# Patient Record
Sex: MALE | Race: WHITE | NOT HISPANIC OR LATINO | Employment: OTHER | URBAN - METROPOLITAN AREA
[De-identification: names, ages, dates, MRNs, and addresses within clinical notes are randomized per-mention and may not be internally consistent; named-entity substitution may affect disease eponyms.]

---

## 2017-01-12 ENCOUNTER — HOSPITAL ENCOUNTER (OUTPATIENT)
Dept: CT IMAGING | Facility: HOSPITAL | Age: 77
Discharge: HOME | End: 2017-01-12
Attending: INTERNAL MEDICINE | Admitting: INTERNAL MEDICINE

## 2017-01-12 DIAGNOSIS — K57.92 ACUTE DIVERTICULITIS: ICD-10-CM

## 2017-01-12 PROCEDURE — 74177 CT ABD & PELVIS W/CONTRAST: CPT

## 2017-01-12 PROCEDURE — 82565 ASSAY OF CREATININE: CPT

## 2017-01-12 PROCEDURE — 0 IOPAMIDOL 61 % SOLUTION: Performed by: INTERNAL MEDICINE

## 2017-01-12 RX ADMIN — BARIUM SULFATE 450 ML: 21 SUSPENSION ORAL at 13:45

## 2017-01-12 RX ADMIN — IOPAMIDOL 99 ML: 612 INJECTION, SOLUTION INTRAVENOUS at 14:52

## 2017-01-13 LAB — CREAT BLDA-MCNC: 0.8 MG/DL (ref 0.6–1.3)

## 2017-03-15 ENCOUNTER — LAB (OUTPATIENT)
Dept: LAB | Facility: HOSPITAL | Age: 77
End: 2017-03-15

## 2017-03-15 ENCOUNTER — OFFICE VISIT (OUTPATIENT)
Dept: ONCOLOGY | Facility: CLINIC | Age: 77
End: 2017-03-15

## 2017-03-15 VITALS
TEMPERATURE: 97.9 F | SYSTOLIC BLOOD PRESSURE: 141 MMHG | WEIGHT: 182 LBS | HEIGHT: 73 IN | HEART RATE: 62 BPM | DIASTOLIC BLOOD PRESSURE: 66 MMHG | RESPIRATION RATE: 16 BRPM | BODY MASS INDEX: 24.12 KG/M2

## 2017-03-15 DIAGNOSIS — C90.30 PLASMACYTOMA (HCC): ICD-10-CM

## 2017-03-15 DIAGNOSIS — C90.30 PLASMACYTOMA (HCC): Primary | ICD-10-CM

## 2017-03-15 LAB
ALBUMIN SERPL-MCNC: 4.3 G/DL (ref 3.2–4.8)
ALBUMIN/GLOB SERPL: 1.3 G/DL (ref 1.5–2.5)
ALP SERPL-CCNC: 64 U/L (ref 25–100)
ALT SERPL W P-5'-P-CCNC: 19 U/L (ref 7–40)
ANION GAP SERPL CALCULATED.3IONS-SCNC: -1 MMOL/L (ref 3–11)
AST SERPL-CCNC: 21 U/L (ref 0–33)
BILIRUB SERPL-MCNC: 0.6 MG/DL (ref 0.3–1.2)
BUN BLD-MCNC: 17 MG/DL (ref 9–23)
BUN/CREAT SERPL: 18.9 (ref 7–25)
CALCIUM SPEC-SCNC: 10.1 MG/DL (ref 8.7–10.4)
CHLORIDE SERPL-SCNC: 104 MMOL/L (ref 99–109)
CO2 SERPL-SCNC: 37 MMOL/L (ref 20–31)
CREAT BLD-MCNC: 0.9 MG/DL (ref 0.6–1.3)
CRP SERPL-MCNC: 0.5 MG/DL (ref 0–10)
ERYTHROCYTE [DISTWIDTH] IN BLOOD BY AUTOMATED COUNT: 14.6 % (ref 11.3–14.5)
ERYTHROCYTE [SEDIMENTATION RATE] IN BLOOD: 21 MM/HR (ref 0–20)
GFR SERPL CREATININE-BSD FRML MDRD: 82 ML/MIN/1.73
GLOBULIN UR ELPH-MCNC: 3.4 GM/DL
GLUCOSE BLD-MCNC: 151 MG/DL (ref 70–100)
HCT VFR BLD AUTO: 40.5 % (ref 38.9–50.9)
HGB BLD-MCNC: 13.1 G/DL (ref 13.1–17.5)
LYMPHOCYTES # BLD AUTO: 2 10*3/MM3 (ref 0.6–4.8)
LYMPHOCYTES NFR BLD AUTO: 30.5 % (ref 24–44)
MCH RBC QN AUTO: 31.1 PG (ref 27–31)
MCHC RBC AUTO-ENTMCNC: 32.4 G/DL (ref 32–36)
MCV RBC AUTO: 96.1 FL (ref 80–99)
MONOCYTES # BLD AUTO: 0.2 10*3/MM3 (ref 0–1)
MONOCYTES NFR BLD AUTO: 3.3 % (ref 0–12)
NEUTROPHILS # BLD AUTO: 4.4 10*3/MM3 (ref 1.5–8.3)
NEUTROPHILS NFR BLD AUTO: 66.2 % (ref 41–71)
PLATELET # BLD AUTO: 140 10*3/MM3 (ref 150–450)
PMV BLD AUTO: 8.4 FL (ref 6–12)
POTASSIUM BLD-SCNC: 4.3 MMOL/L (ref 3.5–5.5)
PROT SERPL-MCNC: 7.7 G/DL (ref 5.7–8.2)
RBC # BLD AUTO: 4.21 10*6/MM3 (ref 4.2–5.76)
SODIUM BLD-SCNC: 140 MMOL/L (ref 132–146)
WBC NRBC COR # BLD: 6.6 10*3/MM3 (ref 3.5–10.8)

## 2017-03-15 PROCEDURE — 84155 ASSAY OF PROTEIN SERUM: CPT | Performed by: INTERNAL MEDICINE

## 2017-03-15 PROCEDURE — 82232 ASSAY OF BETA-2 PROTEIN: CPT | Performed by: INTERNAL MEDICINE

## 2017-03-15 PROCEDURE — 86334 IMMUNOFIX E-PHORESIS SERUM: CPT | Performed by: INTERNAL MEDICINE

## 2017-03-15 PROCEDURE — 36415 COLL VENOUS BLD VENIPUNCTURE: CPT

## 2017-03-15 PROCEDURE — 84165 PROTEIN E-PHORESIS SERUM: CPT | Performed by: INTERNAL MEDICINE

## 2017-03-15 PROCEDURE — 86140 C-REACTIVE PROTEIN: CPT | Performed by: INTERNAL MEDICINE

## 2017-03-15 PROCEDURE — 85652 RBC SED RATE AUTOMATED: CPT | Performed by: INTERNAL MEDICINE

## 2017-03-15 PROCEDURE — 80053 COMPREHEN METABOLIC PANEL: CPT | Performed by: INTERNAL MEDICINE

## 2017-03-15 PROCEDURE — 99213 OFFICE O/P EST LOW 20 MIN: CPT | Performed by: INTERNAL MEDICINE

## 2017-03-15 PROCEDURE — 85025 COMPLETE CBC W/AUTO DIFF WBC: CPT

## 2017-03-15 PROCEDURE — 83883 ASSAY NEPHELOMETRY NOT SPEC: CPT | Performed by: INTERNAL MEDICINE

## 2017-03-15 RX ORDER — DICYCLOMINE HYDROCHLORIDE 10 MG/1
CAPSULE ORAL
COMMUNITY
Start: 2017-01-12 | End: 2017-05-17

## 2017-03-15 NOTE — PROGRESS NOTES
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  He also has history of a benign range tumor that was resected in 1991.  He's been feeling great.  His energy level is been fine.  His activity level is been high.  He is managed to avoid the flu and is had no recent other infectious illnesses.  He has had no new bony or neurologic symptoms      PROBLEM LIST   Patient Active Problem List   Diagnosis   • CAD (coronary artery disease)   • Dyslipidemia   • Right hip pain   • Arthritis   • Cellulitis of left lower extremity   • Left carotid bruit   • Plasmacytoma   • Brain cancer   • Neck pain       HISTORY OF PRESENT ILLNESS:   This very pleasant 76-year-old gentleman returns.    Past Medical History, Past Surgical History, Social History, Family History have been reviewed and are without significant changes except as mentioned.      Medications:      Current Outpatient Prescriptions:   •  aspirin 81 MG tablet, Take  by mouth daily., Disp: , Rfl:   •  atorvastatin (LIPITOR) 20 MG tablet, Take  by mouth. 1 QHS, Disp: , Rfl:   •  dicyclomine (BENTYL) 10 MG capsule, , Disp: , Rfl:   •  ezetimibe (ZETIA) 10 MG tablet, Take  by mouth., Disp: , Rfl:   •  glimepiride (AMARYL) 2 MG tablet, Take  by mouth daily., Disp: , Rfl:   •  latanoprost (XALATAN) 0.005 % ophthalmic solution, Apply  to eye. qhs, Disp: , Rfl:   •  metoprolol succinate XL (TOPROL-XL) 25 MG 24 hr tablet, Take 1 tablet by mouth daily. 1 QD, Disp: 90 tablet, Rfl: 3  •  nitroglycerin (NITROSTAT) 0.4 MG SL tablet, Place 1 tablet under the tongue every 5 (five) minutes as needed for chest pain., Disp: 25 tablet, Rfl: 6  •  ramipril (ALTACE) 5 MG capsule, Take  by mouth. 1 QD, Disp: , Rfl:     ALLERGIES:    Allergies   Allergen Reactions   • Penicillins    • Zocor [Simvastatin]        ROS:  Review of Systems   Constitutional: Negative for  "activity change and appetite change.   HENT: Negative for mouth sores, sinus pressure and voice change.    Eyes: Negative for visual disturbance.   Respiratory: Negative for shortness of breath.    Cardiovascular: Negative for chest pain.   Gastrointestinal: Negative for abdominal pain and vomiting.   Genitourinary: Negative for dysuria.   Musculoskeletal: Negative for arthralgias and myalgias.   Skin: Negative for color change.   Neurological: Negative for dizziness, syncope and headaches.   Hematological: Negative for adenopathy.   Psychiatric/Behavioral: Negative for confusion, sleep disturbance and suicidal ideas. The patient is not nervous/anxious.            Objective:    Visit Vitals   • /66   • Pulse 62   • Temp 97.9 °F (36.6 °C) (Temporal Artery )   • Resp 16   • Ht 73\" (185.4 cm)   • Wt 182 lb (82.6 kg)   • BMI 24.01 kg/m2       Physical Exam   Constitutional: He is oriented to person, place, and time. He appears well-developed and well-nourished. No distress.   HENT:   Head: Normocephalic.   Mouth/Throat: Oropharynx is clear and moist.   Eyes: Conjunctivae and EOM are normal. No scleral icterus.   Neck: Normal range of motion. Neck supple. No thyromegaly present.   Cardiovascular: Normal rate, regular rhythm and normal heart sounds.    No murmur heard.  Pulmonary/Chest: Effort normal and breath sounds normal. He has no wheezes. He has no rales.   Abdominal: Soft. Bowel sounds are normal. He exhibits no distension and no mass. There is no tenderness.   Musculoskeletal: He exhibits no edema or tenderness.   Lymphadenopathy:     He has no cervical adenopathy.   Neurological: He is alert and oriented to person, place, and time. He displays normal reflexes. No cranial nerve deficit.   Skin: Skin is warm and dry. No rash noted.   Psychiatric: He has a normal mood and affect. Judgment normal.   Vitals reviewed.             RECENT LABS:  Hematology WBC   Date Value Ref Range Status   03/15/2017 6.60 3.50 - " 10.80 10*3/mm3 Final   03/17/2016 7.20 3.50 - 10.80 K/mcL Final     HEMOGLOBIN   Date Value Ref Range Status   03/15/2017 13.1 13.1 - 17.5 g/dL Final   03/17/2016 12.3 (L) 13.1 - 17.5 g/dL Final     HEMATOCRIT   Date Value Ref Range Status   03/15/2017 40.5 38.9 - 50.9 % Final   03/17/2016 37.6 (L) 38.9 - 50.9 % Final     MCV   Date Value Ref Range Status   03/15/2017 96.1 80.0 - 99.0 fL Final   03/17/2016 95.5 80.0 - 99.0 fL Final     RDW   Date Value Ref Range Status   03/15/2017 14.6 (H) 11.3 - 14.5 % Final   03/17/2016 14.2 11.3 - 14.5 % Final     MPV   Date Value Ref Range Status   03/15/2017 8.4 6.0 - 12.0 fL Final   03/17/2016 8.3 fL Final     PLATELETS   Date Value Ref Range Status   03/15/2017 140 (L) 150 - 450 10*3/mm3 Final     Comment:     Verified by repeat analysis.    03/17/2016 129 (L) 150 - 450 K/mcL Final     NEUTROPHILS, ABSOLUTE   Date Value Ref Range Status   03/15/2017 4.40 1.50 - 8.30 10*3/mm3 Final     NEUTROPHILS ABSOLUTE   Date Value Ref Range Status   03/17/2016 4.70 1.50 - 8.30 K/mcL Final     LYMPHOCYTES, ABSOLUTE   Date Value Ref Range Status   03/15/2017 2.00 0.60 - 4.80 10*3/mm3 Final     LYMPHOCYTES ABSOLUTE   Date Value Ref Range Status   03/17/2016 2.10 0.60 - 4.80 K/mcL Final     MONOCYTES, ABSOLUTE   Date Value Ref Range Status   03/15/2017 0.20 0.00 - 1.00 10*3/mm3 Final     MONOCYTES ABSOLUTE   Date Value Ref Range Status   03/17/2016 0.40 0.00 - 1.00 K/mcL Final     EOSINOPHILS ABSOLUTE   Date Value Ref Range Status   09/17/2015 0.19 0.10 - 0.30 K/mcL Final     BASOPHILS ABSOLUTE   Date Value Ref Range Status   09/17/2015 0.02 0.00 - 0.20 K/mcL Final     NRBC   Date Value Ref Range Status   03/19/2015 0.0  Final       GLUCOSE   Date Value Ref Range Status   03/15/2017 151 (H) 70 - 100 mg/dL Final   03/17/2016 168 (H) 70 - 100 mg/dL Final     SODIUM   Date Value Ref Range Status   03/15/2017 140 132 - 146 mmol/L Final   03/17/2016 140 132 - 146 mmol/L Final     POTASSIUM   Date  Value Ref Range Status   03/15/2017 4.3 3.5 - 5.5 mmol/L Final   03/17/2016 3.9 3.5 - 5.5 mmol/L Final     CO2   Date Value Ref Range Status   03/15/2017 37.0 (H) 20.0 - 31.0 mmol/L Final   03/17/2016 32 (H) 20 - 31 mmol/L Final     CHLORIDE   Date Value Ref Range Status   03/15/2017 104 99 - 109 mmol/L Final   03/17/2016 105 99 - 109 mmol/L Final     ANION GAP   Date Value Ref Range Status   03/15/2017 -1.0 (L) 3.0 - 11.0 mmol/L Final   03/17/2016 3 3 - 11 mmol/L Final     CREATININE   Date Value Ref Range Status   03/15/2017 0.90 0.60 - 1.30 mg/dL Final   01/12/2017 0.80 0.60 - 1.30 mg/dL Final     Comment:     Serial Number: [id number]    : [id number]   03/17/2016 0.9 0.6 - 1.3 mg/dL Final     BUN   Date Value Ref Range Status   03/15/2017 17 9 - 23 mg/dL Final   03/17/2016 20 9 - 23 mg/dL Final     BUN/CREATININE RATIO   Date Value Ref Range Status   03/15/2017 18.9 7.0 - 25.0 Final     CALCIUM   Date Value Ref Range Status   03/15/2017 10.1 8.7 - 10.4 mg/dL Final   03/17/2016 9.1 8.7 - 10.4 mg/dL Final     EGFR NON  AMER   Date Value Ref Range Status   03/15/2017 82 >60 mL/min/1.73 Final     ALKALINE PHOSPHATASE   Date Value Ref Range Status   03/15/2017 64 25 - 100 U/L Final   03/17/2016 58 25 - 100 Units/L Final     TOTAL PROTEIN   Date Value Ref Range Status   03/15/2017 7.7 5.7 - 8.2 g/dL Final   03/17/2016 7.1 5.7 - 8.2 g/dL Final     ALT (SGPT)   Date Value Ref Range Status   03/15/2017 19 7 - 40 U/L Final   03/17/2016 23 7 - 40 Units/L Final     AST (SGOT)   Date Value Ref Range Status   03/15/2017 21 0 - 33 U/L Final   03/17/2016 23 0 - 33 Units/L Final     TOTAL BILIRUBIN   Date Value Ref Range Status   03/15/2017 0.6 0.3 - 1.2 mg/dL Final   03/17/2016 0.7 0.3 - 1.2 mg/dL Final     ALBUMIN   Date Value Ref Range Status   03/15/2017 4.30 3.20 - 4.80 g/dL Final   09/07/2016 3.7 2.9 - 4.4 g/dL Final     GLOBULIN   Date Value Ref Range Status   03/15/2017 3.4 gm/dL Final     A/G RATIO   Date Value  Ref Range Status   03/15/2017 1.3 (L) 1.5 - 2.5 g/dL Final   09/07/2016 1.0 0.7 - 1.7 Final          Perf Status: 0    Assessment/Plan    Diagnoses and all orders for this visit:    Plasmacytoma        Has no evidence of progression of his monoclonal protein/overt myeloma.  I will follow-up his serum immunoelectrophoresis of course.  I'll plan on seeing him back in 6 months with a skeletal survey prior to that.  I spent about 16 or 17 minutes with him and 12 minutes was due to counseling regarding his MGUS and his prior isolated plasmacytoma            Visit time was  minutes, greater than 50% spent in counseling    Naresh Turner MD , 3/15/2017    CC:

## 2017-03-16 LAB
ALBUMIN SERPL-MCNC: 3.5 G/DL (ref 2.9–4.4)
ALBUMIN/GLOB SERPL: 1 {RATIO} (ref 0.7–1.7)
ALPHA1 GLOB FLD ELPH-MCNC: 0.3 G/DL (ref 0–0.4)
ALPHA2 GLOB SERPL ELPH-MCNC: 0.8 G/DL (ref 0.4–1)
B-GLOBULIN SERPL ELPH-MCNC: 1 G/DL (ref 0.7–1.3)
B2 MICROGLOB SERPL-MCNC: 1.9 MG/L (ref 0.6–2.4)
GAMMA GLOB SERPL ELPH-MCNC: 1.5 G/DL (ref 0.4–1.8)
GLOBULIN SER CALC-MCNC: 3.6 G/DL (ref 2.2–3.9)
IGA SERPL-MCNC: 301 MG/DL (ref 61–437)
IGG SERPL-MCNC: 1363 MG/DL (ref 700–1600)
IGM SERPL-MCNC: 92 MG/DL (ref 15–143)
INTERPRETATION SERPL IEP-IMP: ABNORMAL
KAPPA LC SERPL-MCNC: 29.15 MG/L (ref 3.3–19.4)
KAPPA LC/LAMBDA SER: 0.69 {RATIO} (ref 0.26–1.65)
LAMBDA LC FREE SERPL-MCNC: 42.33 MG/L (ref 5.71–26.3)
Lab: ABNORMAL
M-SPIKE: 0.4 G/DL
PROT SERPL-MCNC: 7.1 G/DL (ref 6–8.5)

## 2017-05-17 ENCOUNTER — OFFICE VISIT (OUTPATIENT)
Dept: CARDIOLOGY | Facility: CLINIC | Age: 77
End: 2017-05-17

## 2017-05-17 VITALS
HEIGHT: 73 IN | WEIGHT: 183.4 LBS | SYSTOLIC BLOOD PRESSURE: 130 MMHG | DIASTOLIC BLOOD PRESSURE: 66 MMHG | HEART RATE: 69 BPM | BODY MASS INDEX: 24.31 KG/M2

## 2017-05-17 DIAGNOSIS — I10 ESSENTIAL HYPERTENSION: ICD-10-CM

## 2017-05-17 DIAGNOSIS — I25.10 CORONARY ARTERY DISEASE INVOLVING NATIVE CORONARY ARTERY OF NATIVE HEART WITHOUT ANGINA PECTORIS: Primary | ICD-10-CM

## 2017-05-17 DIAGNOSIS — E78.5 DYSLIPIDEMIA: ICD-10-CM

## 2017-05-17 PROCEDURE — 99214 OFFICE O/P EST MOD 30 MIN: CPT | Performed by: INTERNAL MEDICINE

## 2017-08-09 ENCOUNTER — HOSPITAL ENCOUNTER (OUTPATIENT)
Dept: CARDIOLOGY | Facility: HOSPITAL | Age: 77
Discharge: HOME | End: 2017-08-09
Attending: INTERNAL MEDICINE | Admitting: INTERNAL MEDICINE

## 2017-08-09 ENCOUNTER — TELEPHONE (OUTPATIENT)
Dept: CARDIOLOGY | Facility: HOSPITAL | Age: 77
End: 2017-08-09

## 2017-08-09 DIAGNOSIS — R42 DIZZINESS: ICD-10-CM

## 2017-08-09 DIAGNOSIS — R09.89 BRUIT: ICD-10-CM

## 2017-08-09 DIAGNOSIS — R42 DIZZINESS: Primary | ICD-10-CM

## 2017-08-09 PROCEDURE — 93880 EXTRACRANIAL BILAT STUDY: CPT

## 2017-08-09 PROCEDURE — 93880 EXTRACRANIAL BILAT STUDY: CPT | Performed by: INTERNAL MEDICINE

## 2017-08-09 NOTE — TELEPHONE ENCOUNTER
Mr. Joyce came into the office c/o dizzness, water swishing sound on left, and hearing heart pounding in the left ear.      Orders for Carotid duplex entered.

## 2017-08-16 LAB
BH CV ECHO MEAS - BSA(HAYCOCK): 2.1 M^2
BH CV ECHO MEAS - BSA: 2.1 M^2
BH CV ECHO MEAS - BZI_BMI: 24.1 KILOGRAMS/M^2
BH CV ECHO MEAS - BZI_METRIC_HEIGHT: 185.4 CM
BH CV ECHO MEAS - BZI_METRIC_WEIGHT: 83 KG
BH CV XLRA MEAS LEFT DIST CCA EDV: 20.3 CM/SEC
BH CV XLRA MEAS LEFT DIST CCA PSV: 107 CM/SEC
BH CV XLRA MEAS LEFT DIST ICA EDV: 33.5 CM/SEC
BH CV XLRA MEAS LEFT DIST ICA PSV: 125 CM/SEC
BH CV XLRA MEAS LEFT ICA/CCA RATIO: 1.1
BH CV XLRA MEAS LEFT MID ICA EDV: 28.6 CM/SEC
BH CV XLRA MEAS LEFT MID ICA PSV: 119 CM/SEC
BH CV XLRA MEAS LEFT PROX CCA EDV: 16.1 CM/SEC
BH CV XLRA MEAS LEFT PROX CCA PSV: 124 CM/SEC
BH CV XLRA MEAS LEFT PROX ECA EDV: 16.8 CM/SEC
BH CV XLRA MEAS LEFT PROX ECA PSV: 238 CM/SEC
BH CV XLRA MEAS LEFT PROX ICA EDV: 23 CM/SEC
BH CV XLRA MEAS LEFT PROX ICA PSV: 102 CM/SEC
BH CV XLRA MEAS LEFT PROX SCLA EDV: 0 CM/SEC
BH CV XLRA MEAS LEFT PROX SCLA PSV: 116 CM/SEC
BH CV XLRA MEAS LEFT VERTEBRAL A EDV: 15.7 CM/SEC
BH CV XLRA MEAS LEFT VERTEBRAL A PSV: 62.4 CM/SEC
BH CV XLRA MEAS RIGHT DIST CCA EDV: 20.3 CM/SEC
BH CV XLRA MEAS RIGHT DIST CCA PSV: 90.1 CM/SEC
BH CV XLRA MEAS RIGHT DIST ICA EDV: 39.3 CM/SEC
BH CV XLRA MEAS RIGHT DIST ICA PSV: 127 CM/SEC
BH CV XLRA MEAS RIGHT ICA/CCA RATIO: 1.7
BH CV XLRA MEAS RIGHT MID ICA EDV: 35.8 CM/SEC
BH CV XLRA MEAS RIGHT MID ICA PSV: 119 CM/SEC
BH CV XLRA MEAS RIGHT PROX CCA EDV: 18.9 CM/SEC
BH CV XLRA MEAS RIGHT PROX CCA PSV: 108 CM/SEC
BH CV XLRA MEAS RIGHT PROX ECA EDV: 13.8 CM/SEC
BH CV XLRA MEAS RIGHT PROX ECA PSV: 196 CM/SEC
BH CV XLRA MEAS RIGHT PROX ICA EDV: 36.5 CM/SEC
BH CV XLRA MEAS RIGHT PROX ICA PSV: 151 CM/SEC
BH CV XLRA MEAS RIGHT PROX SCLA EDV: 0 CM/SEC
BH CV XLRA MEAS RIGHT PROX SCLA PSV: 152 CM/SEC
BH CV XLRA MEAS RIGHT VERTEBRAL A EDV: 10.5 CM/SEC
BH CV XLRA MEAS RIGHT VERTEBRAL A PSV: 56.8 CM/SEC

## 2017-09-06 ENCOUNTER — HOSPITAL ENCOUNTER (OUTPATIENT)
Dept: GENERAL RADIOLOGY | Facility: HOSPITAL | Age: 77
Discharge: HOME | End: 2017-09-06
Attending: INTERNAL MEDICINE | Admitting: INTERNAL MEDICINE

## 2017-09-06 ENCOUNTER — LAB (OUTPATIENT)
Dept: LAB | Facility: HOSPITAL | Age: 77
End: 2017-09-06

## 2017-09-06 DIAGNOSIS — C90.30 PLASMACYTOMA (HCC): ICD-10-CM

## 2017-09-06 DIAGNOSIS — D61.818 PANCYTOPENIA (HCC): Primary | ICD-10-CM

## 2017-09-06 DIAGNOSIS — D61.818 PANCYTOPENIA (HCC): ICD-10-CM

## 2017-09-06 LAB
ALBUMIN SERPL-MCNC: 4 G/DL (ref 3.2–4.8)
ALBUMIN/GLOB SERPL: 1.2 G/DL (ref 1.5–2.5)
ALP SERPL-CCNC: 55 U/L (ref 25–100)
ALT SERPL W P-5'-P-CCNC: 18 U/L (ref 7–40)
ANION GAP SERPL CALCULATED.3IONS-SCNC: 3 MMOL/L (ref 3–11)
AST SERPL-CCNC: 19 U/L (ref 0–33)
BASOPHILS # BLD AUTO: 0 10*3/MM3 (ref 0–0.2)
BASOPHILS # BLD AUTO: 0 10*3/MM3 (ref 0–0.2)
BASOPHILS NFR BLD AUTO: 0 % (ref 0–1)
BASOPHILS NFR BLD AUTO: 0 % (ref 0–1)
BILIRUB SERPL-MCNC: 0.7 MG/DL (ref 0.3–1.2)
BUN BLD-MCNC: 17 MG/DL (ref 9–23)
BUN/CREAT SERPL: 21.3 (ref 7–25)
CALCIUM SPEC-SCNC: 9.3 MG/DL (ref 8.7–10.4)
CHLORIDE SERPL-SCNC: 105 MMOL/L (ref 99–109)
CO2 SERPL-SCNC: 30 MMOL/L (ref 20–31)
CREAT BLD-MCNC: 0.8 MG/DL (ref 0.6–1.3)
CRP SERPL-MCNC: 0.02 MG/DL (ref 0–1)
DEPRECATED RDW RBC AUTO: 60.2 FL (ref 37–54)
DEPRECATED RDW RBC AUTO: 61 FL (ref 37–54)
EOSINOPHIL # BLD AUTO: 0.02 10*3/MM3 (ref 0–0.3)
EOSINOPHIL # BLD AUTO: 0.03 10*3/MM3 (ref 0–0.3)
EOSINOPHIL NFR BLD AUTO: 0.8 % (ref 0–3)
EOSINOPHIL NFR BLD AUTO: 0.9 % (ref 0–3)
ERYTHROCYTE [DISTWIDTH] IN BLOOD BY AUTOMATED COUNT: 15.7 % (ref 11.3–14.5)
ERYTHROCYTE [DISTWIDTH] IN BLOOD BY AUTOMATED COUNT: 15.8 % (ref 11.3–14.5)
ERYTHROCYTE [SEDIMENTATION RATE] IN BLOOD: 12 MM/HR (ref 0–20)
GFR SERPL CREATININE-BSD FRML MDRD: 93 ML/MIN/1.73
GLOBULIN UR ELPH-MCNC: 3.4 GM/DL
GLUCOSE BLD-MCNC: 148 MG/DL (ref 70–100)
HCT VFR BLD AUTO: 27.4 % (ref 38.9–50.9)
HCT VFR BLD AUTO: 27.5 % (ref 38.9–50.9)
HGB BLD-MCNC: 9 G/DL (ref 13.1–17.5)
HGB BLD-MCNC: 9.3 G/DL (ref 13.1–17.5)
IMM GRANULOCYTES # BLD: 0 10*3/MM3 (ref 0–0.03)
IMM GRANULOCYTES # BLD: 0.01 10*3/MM3 (ref 0–0.03)
IMM GRANULOCYTES NFR BLD: 0 % (ref 0–0.6)
IMM GRANULOCYTES NFR BLD: 0.3 % (ref 0–0.6)
LYMPHOCYTES # BLD AUTO: 1.33 10*3/MM3 (ref 0.6–4.8)
LYMPHOCYTES # BLD AUTO: 1.76 10*3/MM3 (ref 0.6–4.8)
LYMPHOCYTES NFR BLD AUTO: 50.9 % (ref 24–44)
LYMPHOCYTES NFR BLD AUTO: 53 % (ref 24–44)
MCH RBC QN AUTO: 34.7 PG (ref 27–31)
MCH RBC QN AUTO: 35.9 PG (ref 27–31)
MCHC RBC AUTO-ENTMCNC: 32.7 G/DL (ref 32–36)
MCHC RBC AUTO-ENTMCNC: 33.9 G/DL (ref 32–36)
MCV RBC AUTO: 105.8 FL (ref 80–99)
MCV RBC AUTO: 106.2 FL (ref 80–99)
MONOCYTES # BLD AUTO: 0.11 10*3/MM3 (ref 0–1)
MONOCYTES # BLD AUTO: 0.2 10*3/MM3 (ref 0–1)
MONOCYTES NFR BLD AUTO: 4.4 % (ref 0–12)
MONOCYTES NFR BLD AUTO: 5.8 % (ref 0–12)
NEUTROPHILS # BLD AUTO: 1.05 10*3/MM3 (ref 1.5–8.3)
NEUTROPHILS # BLD AUTO: 1.46 10*3/MM3 (ref 1.5–8.3)
NEUTROPHILS NFR BLD AUTO: 41.8 % (ref 41–71)
NEUTROPHILS NFR BLD AUTO: 42.1 % (ref 41–71)
PLAT MORPH BLD: NORMAL
PLATELET # BLD AUTO: 101 10*3/MM3 (ref 150–450)
PLATELET # BLD AUTO: 104 10*3/MM3 (ref 150–450)
PMV BLD AUTO: 10.1 FL (ref 6–12)
PMV BLD AUTO: 10.4 FL (ref 6–12)
POTASSIUM BLD-SCNC: 4.2 MMOL/L (ref 3.5–5.5)
PROT SERPL-MCNC: 7.4 G/DL (ref 5.7–8.2)
RBC # BLD AUTO: 2.59 10*6/MM3 (ref 4.2–5.76)
RBC # BLD AUTO: 2.59 10*6/MM3 (ref 4.2–5.76)
RBC MORPH BLD: NORMAL
SODIUM BLD-SCNC: 138 MMOL/L (ref 132–146)
WBC MORPH BLD: NORMAL
WBC NRBC COR # BLD: 2.51 10*3/MM3 (ref 3.5–10.8)
WBC NRBC COR # BLD: 3.46 10*3/MM3 (ref 3.5–10.8)

## 2017-09-06 PROCEDURE — 84165 PROTEIN E-PHORESIS SERUM: CPT

## 2017-09-06 PROCEDURE — 83883 ASSAY NEPHELOMETRY NOT SPEC: CPT

## 2017-09-06 PROCEDURE — 85025 COMPLETE CBC W/AUTO DIFF WBC: CPT

## 2017-09-06 PROCEDURE — 82232 ASSAY OF BETA-2 PROTEIN: CPT

## 2017-09-06 PROCEDURE — 86140 C-REACTIVE PROTEIN: CPT

## 2017-09-06 PROCEDURE — 77075 RADEX OSSEOUS SURVEY COMPL: CPT

## 2017-09-06 PROCEDURE — 80053 COMPREHEN METABOLIC PANEL: CPT

## 2017-09-06 PROCEDURE — 85007 BL SMEAR W/DIFF WBC COUNT: CPT

## 2017-09-06 PROCEDURE — 84155 ASSAY OF PROTEIN SERUM: CPT

## 2017-09-06 PROCEDURE — 36415 COLL VENOUS BLD VENIPUNCTURE: CPT

## 2017-09-06 PROCEDURE — 85652 RBC SED RATE AUTOMATED: CPT

## 2017-09-06 PROCEDURE — 86334 IMMUNOFIX E-PHORESIS SERUM: CPT

## 2017-09-07 LAB
ALBUMIN SERPL-MCNC: 3.7 G/DL (ref 2.9–4.4)
ALBUMIN/GLOB SERPL: 1.2 {RATIO} (ref 0.7–1.7)
ALPHA1 GLOB FLD ELPH-MCNC: 0.2 G/DL (ref 0–0.4)
ALPHA2 GLOB SERPL ELPH-MCNC: 0.6 G/DL (ref 0.4–1)
B-GLOBULIN SERPL ELPH-MCNC: 1 G/DL (ref 0.7–1.3)
GAMMA GLOB SERPL ELPH-MCNC: 1.4 G/DL (ref 0.4–1.8)
GLOBULIN SER CALC-MCNC: 3.2 G/DL (ref 2.2–3.9)
IGA SERPL-MCNC: 300 MG/DL (ref 61–437)
IGG SERPL-MCNC: 1265 MG/DL (ref 700–1600)
IGM SERPL-MCNC: 81 MG/DL (ref 15–143)
INTERPRETATION SERPL IEP-IMP: ABNORMAL
KAPPA LC SERPL-MCNC: 29.2 MG/L (ref 3.3–19.4)
KAPPA LC/LAMBDA SER: 0.7 {RATIO} (ref 0.26–1.65)
LAMBDA LC FREE SERPL-MCNC: 41.9 MG/L (ref 5.7–26.3)
Lab: ABNORMAL
M-SPIKE: 0.3 G/DL
PROT SERPL-MCNC: 6.9 G/DL (ref 6–8.5)

## 2017-09-08 DIAGNOSIS — C90.30 PLASMACYTOMA (HCC): Primary | ICD-10-CM

## 2017-09-08 LAB — B2 MICROGLOB SERPL-MCNC: 1.6 MG/L (ref 0.6–2.4)

## 2017-09-08 RX ORDER — LORAZEPAM 2 MG/ML
1 INJECTION INTRAMUSCULAR
Status: CANCELLED | OUTPATIENT
Start: 2017-09-08

## 2017-09-08 RX ORDER — DIPHENHYDRAMINE HYDROCHLORIDE 50 MG/ML
25 INJECTION INTRAMUSCULAR; INTRAVENOUS EVERY 4 HOURS PRN
Status: CANCELLED | OUTPATIENT
Start: 2017-09-08

## 2017-09-11 ENCOUNTER — DOCUMENTATION (OUTPATIENT)
Dept: ONCOLOGY | Facility: CLINIC | Age: 77
End: 2017-09-11

## 2017-09-11 ENCOUNTER — CLINICAL SUPPORT (OUTPATIENT)
Dept: ONCOLOGY | Facility: HOSPITAL | Age: 77
End: 2017-09-11

## 2017-09-11 VITALS
SYSTOLIC BLOOD PRESSURE: 135 MMHG | HEART RATE: 57 BPM | TEMPERATURE: 98.3 F | OXYGEN SATURATION: 97 % | RESPIRATION RATE: 18 BRPM | DIASTOLIC BLOOD PRESSURE: 60 MMHG

## 2017-09-11 DIAGNOSIS — C90.30 PLASMACYTOMA (HCC): ICD-10-CM

## 2017-09-11 DIAGNOSIS — D64.9 ANEMIA, UNSPECIFIED TYPE: Primary | ICD-10-CM

## 2017-09-11 DIAGNOSIS — C90.30 PLASMACYTOMA (HCC): Primary | ICD-10-CM

## 2017-09-11 LAB
ABO GROUP BLD: NORMAL
BLD GP AB SCN SERPL QL: NEGATIVE
RH BLD: POSITIVE

## 2017-09-11 PROCEDURE — 85025 COMPLETE CBC W/AUTO DIFF WBC: CPT | Performed by: INTERNAL MEDICINE

## 2017-09-11 PROCEDURE — 88313 SPECIAL STAINS GROUP 2: CPT | Performed by: INTERNAL MEDICINE

## 2017-09-11 PROCEDURE — 88311 DECALCIFY TISSUE: CPT | Performed by: INTERNAL MEDICINE

## 2017-09-11 PROCEDURE — A9270 NON-COVERED ITEM OR SERVICE: HCPCS | Performed by: INTERNAL MEDICINE

## 2017-09-11 PROCEDURE — 63710000001 DIPHENHYDRAMINE PER 50 MG: Performed by: INTERNAL MEDICINE

## 2017-09-11 PROCEDURE — G0364 BONE MARROW ASPIRATE &BIOPSY: HCPCS | Performed by: INTERNAL MEDICINE

## 2017-09-11 PROCEDURE — 96376 TX/PRO/DX INJ SAME DRUG ADON: CPT

## 2017-09-11 PROCEDURE — P9016 RBC LEUKOCYTES REDUCED: HCPCS

## 2017-09-11 PROCEDURE — 88342 IMHCHEM/IMCYTCHM 1ST ANTB: CPT | Performed by: INTERNAL MEDICINE

## 2017-09-11 PROCEDURE — 88237 TISSUE CULTURE BONE MARROW: CPT

## 2017-09-11 PROCEDURE — 36430 TRANSFUSION BLD/BLD COMPNT: CPT

## 2017-09-11 PROCEDURE — 25010000002 MIDAZOLAM PER 1 MG: Performed by: INTERNAL MEDICINE

## 2017-09-11 PROCEDURE — 88305 TISSUE EXAM BY PATHOLOGIST: CPT | Performed by: INTERNAL MEDICINE

## 2017-09-11 PROCEDURE — 36415 COLL VENOUS BLD VENIPUNCTURE: CPT | Performed by: INTERNAL MEDICINE

## 2017-09-11 PROCEDURE — 86900 BLOOD TYPING SEROLOGIC ABO: CPT

## 2017-09-11 PROCEDURE — 88264 CHROMOSOME ANALYSIS 20-25: CPT

## 2017-09-11 PROCEDURE — 86900 BLOOD TYPING SEROLOGIC ABO: CPT | Performed by: INTERNAL MEDICINE

## 2017-09-11 PROCEDURE — 85007 BL SMEAR W/DIFF WBC COUNT: CPT | Performed by: INTERNAL MEDICINE

## 2017-09-11 PROCEDURE — 86850 RBC ANTIBODY SCREEN: CPT | Performed by: INTERNAL MEDICINE

## 2017-09-11 PROCEDURE — 96374 THER/PROPH/DIAG INJ IV PUSH: CPT

## 2017-09-11 PROCEDURE — 88275 CYTOGENETICS 100-300: CPT

## 2017-09-11 PROCEDURE — 86901 BLOOD TYPING SEROLOGIC RH(D): CPT | Performed by: INTERNAL MEDICINE

## 2017-09-11 PROCEDURE — 88184 FLOWCYTOMETRY/ TC 1 MARKER: CPT

## 2017-09-11 PROCEDURE — 88271 CYTOGENETICS DNA PROBE: CPT

## 2017-09-11 PROCEDURE — 86920 COMPATIBILITY TEST SPIN: CPT

## 2017-09-11 PROCEDURE — 38221 DX BONE MARROW BIOPSIES: CPT | Performed by: INTERNAL MEDICINE

## 2017-09-11 PROCEDURE — 63710000001 ACETAMINOPHEN 325 MG TABLET: Performed by: INTERNAL MEDICINE

## 2017-09-11 PROCEDURE — 88185 FLOWCYTOMETRY/TC ADD-ON: CPT

## 2017-09-11 PROCEDURE — 85097 BONE MARROW INTERPRETATION: CPT | Performed by: INTERNAL MEDICINE

## 2017-09-11 RX ORDER — ACETAMINOPHEN 325 MG/1
650 TABLET ORAL ONCE
Status: CANCELLED | OUTPATIENT
Start: 2017-09-11 | End: 2017-09-11

## 2017-09-11 RX ORDER — DIPHENHYDRAMINE HCL 25 MG
25 CAPSULE ORAL ONCE
Status: CANCELLED | OUTPATIENT
Start: 2017-09-11 | End: 2017-09-11

## 2017-09-11 RX ORDER — SODIUM CHLORIDE 9 MG/ML
250 INJECTION, SOLUTION INTRAVENOUS AS NEEDED
Status: CANCELLED | OUTPATIENT
Start: 2017-09-11

## 2017-09-11 RX ORDER — MIDAZOLAM HYDROCHLORIDE 1 MG/ML
2 INJECTION INTRAMUSCULAR; INTRAVENOUS ONCE
Status: COMPLETED | OUTPATIENT
Start: 2017-09-11 | End: 2017-09-11

## 2017-09-11 RX ORDER — SODIUM CHLORIDE 9 MG/ML
250 INJECTION, SOLUTION INTRAVENOUS AS NEEDED
Status: DISCONTINUED | OUTPATIENT
Start: 2017-09-11 | End: 2017-09-11 | Stop reason: HOSPADM

## 2017-09-11 RX ORDER — MIDAZOLAM HYDROCHLORIDE 1 MG/ML
1 INJECTION INTRAMUSCULAR; INTRAVENOUS ONCE
Status: COMPLETED | OUTPATIENT
Start: 2017-09-11 | End: 2017-09-11

## 2017-09-11 RX ORDER — ACETAMINOPHEN 325 MG/1
650 TABLET ORAL ONCE
Status: COMPLETED | OUTPATIENT
Start: 2017-09-11 | End: 2017-09-11

## 2017-09-11 RX ORDER — DIPHENHYDRAMINE HCL 25 MG
25 CAPSULE ORAL ONCE
Status: COMPLETED | OUTPATIENT
Start: 2017-09-11 | End: 2017-09-11

## 2017-09-11 RX ADMIN — MIDAZOLAM HYDROCHLORIDE 1 MG: 1 INJECTION, SOLUTION INTRAMUSCULAR; INTRAVENOUS at 08:55

## 2017-09-11 RX ADMIN — MIDAZOLAM HYDROCHLORIDE 2 MG: 1 INJECTION, SOLUTION INTRAMUSCULAR; INTRAVENOUS at 08:50

## 2017-09-11 RX ADMIN — ACETAMINOPHEN 650 MG: 325 TABLET, FILM COATED ORAL at 13:01

## 2017-09-11 RX ADMIN — DIPHENHYDRAMINE HYDROCHLORIDE 25 MG: 25 CAPSULE ORAL at 13:01

## 2017-09-11 NOTE — PROGRESS NOTES
Critical Test Results      MD: Dr. Turner     Date: 9-11-17     Critical test result: H/H 4.9/ 14.2    Time results received: 1015          Name of Physician notified: On Call- Dr. Frances    Time Physician Notified: 1020      [x]  Orders received    []  Protocol/Standing orders followed    []  No new orders     Called patient. They are still in town. He is with 2 daughters eating at [employer]. He is not driving. They are going to bring him back to hospital now to receive 2 units of blood. Infusion aware.     CBC is still in progress. I do not know any other lab results at this time.

## 2017-09-12 ENCOUNTER — LAB (OUTPATIENT)
Dept: LAB | Facility: HOSPITAL | Age: 77
End: 2017-09-12

## 2017-09-12 DIAGNOSIS — D64.9 ANEMIA, UNSPECIFIED TYPE: ICD-10-CM

## 2017-09-12 DIAGNOSIS — C90.00 MULTIPLE MYELOMA NOT HAVING ACHIEVED REMISSION (HCC): Primary | ICD-10-CM

## 2017-09-12 DIAGNOSIS — D64.9 ANEMIA, UNSPECIFIED TYPE: Primary | ICD-10-CM

## 2017-09-12 LAB
ABO + RH BLD: NORMAL
ABO + RH BLD: NORMAL
BASOPHILS # BLD AUTO: 0 10*3/MM3 (ref 0–0.2)
BASOPHILS NFR BLD AUTO: 0 % (ref 0–1)
BH BB BLOOD EXPIRATION DATE: NORMAL
BH BB BLOOD EXPIRATION DATE: NORMAL
BH BB BLOOD TYPE BARCODE: 5100
BH BB BLOOD TYPE BARCODE: 5100
BH BB DISPENSE STATUS: NORMAL
BH BB DISPENSE STATUS: NORMAL
BH BB PRODUCT CODE: NORMAL
BH BB PRODUCT CODE: NORMAL
BH BB UNIT NUMBER: NORMAL
BH BB UNIT NUMBER: NORMAL
CROSSMATCH INTERPRETATION: NORMAL
CROSSMATCH INTERPRETATION: NORMAL
DEPRECATED RDW RBC AUTO: 61.6 FL (ref 37–54)
EOSINOPHIL # BLD AUTO: 0.03 10*3/MM3 (ref 0–0.3)
EOSINOPHIL NFR BLD AUTO: 1 % (ref 0–3)
ERYTHROCYTE [DISTWIDTH] IN BLOOD BY AUTOMATED COUNT: 16.7 % (ref 11.3–14.5)
HCT VFR BLD AUTO: 29.9 % (ref 38.9–50.9)
HGB BLD-MCNC: 10 G/DL (ref 13.1–17.5)
IMM GRANULOCYTES # BLD: 0.01 10*3/MM3 (ref 0–0.03)
IMM GRANULOCYTES NFR BLD: 0.3 % (ref 0–0.6)
LDH SERPL-CCNC: 175 U/L (ref 120–246)
LYMPHOCYTES # BLD AUTO: 1.44 10*3/MM3 (ref 0.6–4.8)
LYMPHOCYTES NFR BLD AUTO: 46.2 % (ref 24–44)
MCH RBC QN AUTO: 34.1 PG (ref 27–31)
MCHC RBC AUTO-ENTMCNC: 33.4 G/DL (ref 32–36)
MCV RBC AUTO: 102 FL (ref 80–99)
MONOCYTES # BLD AUTO: 0.19 10*3/MM3 (ref 0–1)
MONOCYTES NFR BLD AUTO: 6.1 % (ref 0–12)
NEUTROPHILS # BLD AUTO: 1.45 10*3/MM3 (ref 1.5–8.3)
NEUTROPHILS NFR BLD AUTO: 46.4 % (ref 41–71)
PLATELET # BLD AUTO: 103 10*3/MM3 (ref 150–450)
PMV BLD AUTO: 10.8 FL (ref 6–12)
RBC # BLD AUTO: 2.93 10*6/MM3 (ref 4.2–5.76)
RETICS/RBC NFR AUTO: 2.06 % (ref 0.5–1.5)
UNIT  ABO: NORMAL
UNIT  ABO: NORMAL
UNIT  RH: NORMAL
UNIT  RH: NORMAL
WBC NRBC COR # BLD: 3.12 10*3/MM3 (ref 3.5–10.8)

## 2017-09-12 PROCEDURE — 83615 LACTATE (LD) (LDH) ENZYME: CPT | Performed by: INTERNAL MEDICINE

## 2017-09-12 PROCEDURE — 85045 AUTOMATED RETICULOCYTE COUNT: CPT | Performed by: INTERNAL MEDICINE

## 2017-09-12 PROCEDURE — 36415 COLL VENOUS BLD VENIPUNCTURE: CPT

## 2017-09-12 PROCEDURE — 85025 COMPLETE CBC W/AUTO DIFF WBC: CPT | Performed by: INTERNAL MEDICINE

## 2017-09-12 PROCEDURE — 83010 ASSAY OF HAPTOGLOBIN QUANT: CPT | Performed by: INTERNAL MEDICINE

## 2017-09-12 RX ORDER — ONDANSETRON HYDROCHLORIDE 8 MG/1
8 TABLET, FILM COATED ORAL 3 TIMES DAILY PRN
Qty: 30 TABLET | Refills: 5 | Status: SHIPPED | OUTPATIENT
Start: 2017-09-12

## 2017-09-12 RX ORDER — DEXAMETHASONE 4 MG/1
40 TABLET ORAL
Qty: 30 TABLET | Refills: 3 | Status: SHIPPED | OUTPATIENT
Start: 2017-09-12 | End: 2017-11-20 | Stop reason: SDUPTHER

## 2017-09-12 RX ORDER — ACYCLOVIR 400 MG/1
400 TABLET ORAL 2 TIMES DAILY
Qty: 60 TABLET | Refills: 7 | Status: SHIPPED | OUTPATIENT
Start: 2017-09-12 | End: 2018-04-12

## 2017-09-13 ENCOUNTER — SPECIALTY PHARMACY (OUTPATIENT)
Dept: ONCOLOGY | Facility: HOSPITAL | Age: 77
End: 2017-09-13

## 2017-09-13 ENCOUNTER — OFFICE VISIT (OUTPATIENT)
Dept: ONCOLOGY | Facility: CLINIC | Age: 77
End: 2017-09-13

## 2017-09-13 ENCOUNTER — INFUSION (OUTPATIENT)
Dept: ONCOLOGY | Facility: HOSPITAL | Age: 77
End: 2017-09-13

## 2017-09-13 VITALS
HEIGHT: 73 IN | RESPIRATION RATE: 18 BRPM | HEART RATE: 64 BPM | DIASTOLIC BLOOD PRESSURE: 76 MMHG | SYSTOLIC BLOOD PRESSURE: 172 MMHG | BODY MASS INDEX: 25.05 KG/M2 | WEIGHT: 189 LBS | TEMPERATURE: 97.9 F

## 2017-09-13 DIAGNOSIS — C90.30 PLASMACYTOMA (HCC): Primary | ICD-10-CM

## 2017-09-13 DIAGNOSIS — C90.00 MULTIPLE MYELOMA NOT HAVING ACHIEVED REMISSION (HCC): Primary | ICD-10-CM

## 2017-09-13 DIAGNOSIS — C90.00 MULTIPLE MYELOMA NOT HAVING ACHIEVED REMISSION (HCC): ICD-10-CM

## 2017-09-13 LAB — HAPTOGLOB SERPL-MCNC: 11 MG/DL (ref 34–200)

## 2017-09-13 PROCEDURE — 25010000002 INFLUENZA VAC SPLIT QUAD 0.5 ML SUSPENSION PREFILLED SYRINGE

## 2017-09-13 PROCEDURE — 90674 CCIIV4 VAC NO PRSV 0.5 ML IM: CPT

## 2017-09-13 PROCEDURE — 99214 OFFICE O/P EST MOD 30 MIN: CPT | Performed by: INTERNAL MEDICINE

## 2017-09-13 PROCEDURE — 96372 THER/PROPH/DIAG INJ SC/IM: CPT

## 2017-09-13 PROCEDURE — 25010000002: Performed by: INTERNAL MEDICINE

## 2017-09-13 PROCEDURE — 90674 CCIIV4 VAC NO PRSV 0.5 ML IM: CPT | Performed by: INTERNAL MEDICINE

## 2017-09-13 PROCEDURE — G0008 ADMIN INFLUENZA VIRUS VAC: HCPCS

## 2017-09-13 PROCEDURE — G0008 ADMIN INFLUENZA VIRUS VAC: HCPCS | Performed by: INTERNAL MEDICINE

## 2017-09-13 RX ORDER — SODIUM CHLORIDE 9 MG/ML
250 INJECTION, SOLUTION INTRAVENOUS ONCE
Status: CANCELLED | OUTPATIENT
Start: 2017-09-28

## 2017-09-13 RX ORDER — LENALIDOMIDE 15 MG/1
15 CAPSULE ORAL DAILY
Qty: 14 CAPSULE | Refills: 0 | Status: SHIPPED | OUTPATIENT
Start: 2017-09-13 | End: 2018-01-18

## 2017-09-13 RX ORDER — ALLOPURINOL 300 MG/1
300 TABLET ORAL DAILY
Qty: 30 TABLET | Refills: 11 | Status: SHIPPED | OUTPATIENT
Start: 2017-09-13 | End: 2018-05-07 | Stop reason: SDUPTHER

## 2017-09-13 RX ORDER — SULFAMETHOXAZOLE AND TRIMETHOPRIM 800; 160 MG/1; MG/1
TABLET ORAL
Qty: 12 TABLET | Refills: 5 | Status: SHIPPED | OUTPATIENT
Start: 2017-09-13 | End: 2018-03-02 | Stop reason: SDUPTHER

## 2017-09-13 RX ADMIN — INFLUENZA VIRUS VACCINE 0.5 ML: 15; 15; 15; 15 SUSPENSION INTRAMUSCULAR at 15:24

## 2017-09-13 NOTE — PROGRESS NOTES
Oral Chemotherapy Teaching      Patient Name/:  Marcin Joyce   1940  Oral Chemotherapy Regimen:  Lenalidomide 15mg PO daily on days 1-14, followed by 7 days off + Dex 40mg weekly + Velcade weekly  Date Started Medication:Anticipate Cycle 1 starting 17     Pt reports starting dexamethasone 40mg weekly on 17 as instructed.     Initial Teaching Follow Up Comments     Safety     Storage instructions (away from children; away from heat/cold, sunlight, or moisture), handling - use of gloves (caregivers), washing hands after touching pills, managing waste     “How are you storing your medications?”, reminders on storage, proper handling (caregivers using gloves, washing hands, away from children, managing waste, etc.), disposal of medication with D/C or dosage change    Discussed appropriate handling and storage of oral chemotherapy. Advised on storing at room temp away from pets or children. Pt to wash hands after handling. Do not share medication. Advised of teratogenic nature of medication. Pt and daughters present and verbalized understanding.      Adherence      patient and/or caregiver on how to take medication, take with/without food, assess their adherence potential, stress importance of adherence, ways to manage adherence (pill boxes, phone reminders, calendars), what to do if miss a dose   “How are you taking your medication?” “How are you remembering to take your medication?”, “How many doses have you missed?”, determine reasons for non-adherence (not remembering, side effects, etc), ways to improve, overadherence? Remind patient of ways to improve/maintain adherence   Discussed treatment plan and scheduled. Plan to administer first dose of weekly dexamethasone 40mg on 17 and continue weekly. Discussed Lenalidomide to be administered daily at same time for 14 days, followed by 7 days off. Discussed supportive care medications which include: Allopurinol, bactrim, Aspirin, Acyclovir,  and Zofran. Will plan to provide calendar once velcade injection set up.      Side Effects/Adverse Reactions      patient on potential side effects, s/s, ways to manage, when to call MD/seek help     Determine if patient experiencing side effects, ways to manage  Discussed the following side effects including but not limited to: fatigue, peripheral neuropathy, changes in blood counts, increased risk of DVT, teratogenic potential of medication, and bowel changes.      Miscellaneous     Food interactions, DDIs, financial issues Determine if patient started any new medications since being placed on oral chemo (analyze for DDI) Supportive care medications submitted to [facility]. Will submit Lenalidomide script to [facility] specialty pharmacy.      Additional Notes:  Spoke with patient in clinic along with two daughters and provided aforementioned material. All supportive care medications have been sent into [facility]. Lenalidomide submitted to [facility] pharmacy.     9/14: Spoke with patient [facility]CR able to process medication and obtain financial assistance for patient to bring copay to $0. Plan to mail out this afternoon. Discussed plan with patient. Patient is scheduled to see MD in clinic next week on 9/21/17. Per patient believe he will start velcade at next appt - currently not scheduled. Will mail out calendar with plan to start oral chemo on 9/21/17 to correspond with weekly dex and velcade days. Pt verbalized understanding. Pt provided my contact information and instructions to call with any questions or concerns.

## 2017-09-13 NOTE — PROGRESS NOTES
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  Presentation with symptomatic anemia, with pancytopenia  PROBLEM LIST   Patient Active Problem List   Diagnosis   • CAD (coronary artery disease)   • Dyslipidemia   • Right hip pain   • Arthritis   • Cellulitis of left lower extremity   • Left carotid bruit   • Plasmacytoma   • Brain cancer   • Neck pain   • Anemia   • Multiple myeloma       HISTORY OF PRESENT ILLNESS:   I've known this very very pleasant 77-year-old gentleman for 19 years since he presented with a large iliac bone isolated plasmacytoma.  He has always had a scant monoclonal protein, specifically IgG lambda.  It has never progressed.  He was scheduled for his routine follow-up today.  He had his labs obtained last week.  I see him religiously every 6 months.  His protein levels  had not changed  last week but he was a bit pan cytopenic.  I called him and he told me that he had fatigue.  He's had no recent fevers or chills.  He is not on any new medicines at all.  His weight is been stable.    I suggested a bone marrow aspiration and biopsy which I did 48 hours ago.  Despite the fact that his biochemical studies are unremarkable, and his recent skeletal survey shows no new lesions, his marrow is fairly well packed with plasma cells.  FISH testing etc. is pending.  I have spoken with the attending pathologist regarding the above.    Past Medical History, Past Surgical History, Social History, Family History have been reviewed and are without significant changes except as mentioned.      Medications:      Current Outpatient Prescriptions:   •  acyclovir (ZOVIRAX) 400 MG tablet, Take 1 tablet by mouth 2 (Two) Times a Day., Disp: 60 tablet, Rfl: 7  •  aspirin 81 MG tablet, Take  by mouth daily., Disp: , Rfl:   •  atorvastatin (LIPITOR) 20 MG tablet, Take  by mouth. 1 QHS, Disp: , Rfl:   •  dexamethasone (DECADRON) 4 MG tablet, Take 10 tablets by mouth Daily With Breakfast.  "Take with food on Days 1, 8, 15., Disp: 30 tablet, Rfl: 3  •  ezetimibe (ZETIA) 10 MG tablet, Take 10 mg by mouth Daily., Disp: , Rfl:   •  glimepiride (AMARYL) 2 MG tablet, Take  by mouth daily., Disp: , Rfl:   •  latanoprost (XALATAN) 0.005 % ophthalmic solution, Apply  to eye. qhs, Disp: , Rfl:   •  metoprolol succinate XL (TOPROL-XL) 25 MG 24 hr tablet, Take 1 tablet by mouth daily. 1 QD, Disp: 90 tablet, Rfl: 3  •  nitroglycerin (NITROSTAT) 0.4 MG SL tablet, Place 1 tablet under the tongue every 5 (five) minutes as needed for chest pain., Disp: 25 tablet, Rfl: 6  •  ondansetron (ZOFRAN) 8 MG tablet, Take 1 tablet by mouth 3 (Three) Times a Day As Needed for Nausea or Vomiting., Disp: 30 tablet, Rfl: 5  •  ramipril (ALTACE) 5 MG capsule, Take  by mouth. 1 QD, Disp: , Rfl:   •  sulfamethoxazole-trimethoprim (BACTRIM DS) 800-160 MG per tablet, 1 tab PO 3x/week on M-W-F, Disp: 12 tablet, Rfl: 5    ALLERGIES:    Allergies   Allergen Reactions   • Penicillins    • Simvastatin    • Voltaren [Diclofenac Sodium]        ROS:  Review of Systems   Constitutional: Negative for activity change and appetite change.        Fatigue mentioned.   HENT: Negative for mouth sores, sinus pressure and voice change.    Eyes: Negative for visual disturbance.   Respiratory: Negative for shortness of breath.    Cardiovascular: Negative for chest pain.   Gastrointestinal: Negative for abdominal pain and vomiting.   Genitourinary: Negative for dysuria.   Musculoskeletal: Negative for arthralgias and myalgias.   Skin: Negative for color change.   Neurological: Negative for dizziness, syncope and headaches.   Hematological: Negative for adenopathy.   Psychiatric/Behavioral: Negative for confusion, sleep disturbance and suicidal ideas. The patient is not nervous/anxious.            Objective:    /76 Comment: RUE  Pulse 64  Temp 97.9 °F (36.6 °C) (Temporal Artery )   Resp 18  Ht 73\" (185.4 cm)  Wt 189 lb (85.7 kg)  BMI 24.94 " kg/m2    Physical Exam   Constitutional: He is oriented to person, place, and time. He appears well-developed and well-nourished. No distress.   Marcin looks a little bit pale but otherwise he looks his healthy and is youthful as he ever has   HENT:   Head: Normocephalic.   Mouth/Throat: Oropharynx is clear and moist.   Eyes: Conjunctivae and EOM are normal. No scleral icterus.   Neck: Normal range of motion. Neck supple. No thyromegaly present.   Cardiovascular: Normal rate, regular rhythm and normal heart sounds.    No murmur heard.  Pulmonary/Chest: Effort normal and breath sounds normal. He has no wheezes. He has no rales.   Abdominal: Soft. Bowel sounds are normal. He exhibits no distension and no mass. There is no tenderness.   Musculoskeletal: He exhibits no edema or tenderness.   Lymphadenopathy:     He has no cervical adenopathy.   Neurological: He is alert and oriented to person, place, and time. He displays normal reflexes. No cranial nerve deficit.   Skin: Skin is warm and dry. No rash noted.   Psychiatric: He has a normal mood and affect. Judgment normal.   Vitals reviewed.             RECENT LABS:  Hematology WBC   Date Value Ref Range Status   09/12/2017 3.12 (L) 3.50 - 10.80 10*3/mm3 Final     Hemoglobin   Date Value Ref Range Status   09/12/2017 10.0 (L) 13.1 - 17.5 g/dL Final     Comment:     Verified by repeat analysis.      Hematocrit   Date Value Ref Range Status   09/12/2017 29.9 (L) 38.9 - 50.9 % Final     MCV   Date Value Ref Range Status   09/12/2017 102.0 (H) 80.0 - 99.0 fL Final     RDW   Date Value Ref Range Status   09/12/2017 16.7 (H) 11.3 - 14.5 % Final     MPV   Date Value Ref Range Status   09/12/2017 10.8 6.0 - 12.0 fL Final     Platelets   Date Value Ref Range Status   09/12/2017 103 (L) 150 - 450 10*3/mm3 Final     Immature Grans %   Date Value Ref Range Status   09/12/2017 0.3 0.0 - 0.6 % Final     Neutrophils, Absolute   Date Value Ref Range Status   09/12/2017 1.45 (L) 1.50 -  8.30 10*3/mm3 Final     Lymphocytes, Absolute   Date Value Ref Range Status   09/12/2017 1.44 0.60 - 4.80 10*3/mm3 Final     Monocytes, Absolute   Date Value Ref Range Status   09/12/2017 0.19 0.00 - 1.00 10*3/mm3 Final     Eosinophils, Absolute   Date Value Ref Range Status   09/12/2017 0.03 0.00 - 0.30 10*3/mm3 Final     Basophils, Absolute   Date Value Ref Range Status   09/12/2017 0.00 0.00 - 0.20 10*3/mm3 Final     Immature Grans, Absolute   Date Value Ref Range Status   09/12/2017 0.01 0.00 - 0.03 10*3/mm3 Final     nRBC   Date Value Ref Range Status   03/19/2015 0.0  Final       Glucose   Date Value Ref Range Status   09/06/2017 148 (H) 70 - 100 mg/dL Final     Sodium   Date Value Ref Range Status   09/06/2017 138 132 - 146 mmol/L Final     Potassium   Date Value Ref Range Status   09/06/2017 4.2 3.5 - 5.5 mmol/L Final     CO2   Date Value Ref Range Status   09/06/2017 30.0 20.0 - 31.0 mmol/L Final     Chloride   Date Value Ref Range Status   09/06/2017 105 99 - 109 mmol/L Final     Anion Gap   Date Value Ref Range Status   09/06/2017 3.0 3.0 - 11.0 mmol/L Final     Creatinine   Date Value Ref Range Status   09/06/2017 0.80 0.60 - 1.30 mg/dL Final   01/12/2017 0.80 0.60 - 1.30 mg/dL Final     Comment:     Serial Number: [id number]    : [id number]     BUN   Date Value Ref Range Status   09/06/2017 17 9 - 23 mg/dL Final     BUN/Creatinine Ratio   Date Value Ref Range Status   09/06/2017 21.3 7.0 - 25.0 Final     Calcium   Date Value Ref Range Status   09/06/2017 9.3 8.7 - 10.4 mg/dL Final     eGFR Non  Amer   Date Value Ref Range Status   09/06/2017 93 >60 mL/min/1.73 Final     Alkaline Phosphatase   Date Value Ref Range Status   09/06/2017 55 25 - 100 U/L Final     Total Protein   Date Value Ref Range Status   09/06/2017 7.4 5.7 - 8.2 g/dL Final     ALT (SGPT)   Date Value Ref Range Status   09/06/2017 18 7 - 40 U/L Final     AST (SGOT)   Date Value Ref Range Status   09/06/2017 19 0 - 33 U/L Final      Total Bilirubin   Date Value Ref Range Status   09/06/2017 0.7 0.3 - 1.2 mg/dL Final     Albumin   Date Value Ref Range Status   09/06/2017 4.00 3.20 - 4.80 g/dL Final   09/06/2017 3.7 2.9 - 4.4 g/dL Final     Globulin   Date Value Ref Range Status   09/06/2017 3.4 gm/dL Final     A/G Ratio   Date Value Ref Range Status   09/06/2017 1.2 (L) 1.5 - 2.5 g/dL Final   09/06/2017 1.2 0.7 - 1.7 Final          Perf Status: 1    Assessment/Plan    Diagnoses and all orders for this visit:    Multiple myeloma not having achieved remission    Very interesting presentation.  Fortunately the patient has good kidney function.  He is not hypercalcemic.  He feels much better after receiving 2 units of blood the day before yesterday.  He is hemolyzing somewhat, with a reduced haptoglobin.    I spent greater than 30 minutes with Marcin and his 2 daughters.  I went over the diagnosis and the implications and the need for treatment.  I'm going to have him start his dexamethasone tomorrow morning.  I will follow his blood work closely.  I will treat him with Velcade and dexamethasone and Revlimid.  I will see him back next week.  He will be treated with prophylactic Bactrim as well as prophylactic acyclovir.  He'll also be treated with allopurinol.  I will also get him started on Zometa.      Naresh Turner MD , 9/13/2017    CC:

## 2017-09-15 ENCOUNTER — LAB (OUTPATIENT)
Dept: LAB | Facility: HOSPITAL | Age: 77
End: 2017-09-15

## 2017-09-15 DIAGNOSIS — C90.00 MULTIPLE MYELOMA NOT HAVING ACHIEVED REMISSION (HCC): ICD-10-CM

## 2017-09-15 PROCEDURE — 84166 PROTEIN E-PHORESIS/URINE/CSF: CPT | Performed by: INTERNAL MEDICINE

## 2017-09-15 PROCEDURE — 84156 ASSAY OF PROTEIN URINE: CPT | Performed by: INTERNAL MEDICINE

## 2017-09-15 PROCEDURE — 81050 URINALYSIS VOLUME MEASURE: CPT | Performed by: INTERNAL MEDICINE

## 2017-09-15 PROCEDURE — 86335 IMMUNFIX E-PHORSIS/URINE/CSF: CPT | Performed by: INTERNAL MEDICINE

## 2017-09-15 NOTE — PROGRESS NOTES
17 @ 4:14 pm - I have submitted a prescription to [facility] for Lenalidomide 15mg PO on days 1-14, followed by 7 days off   #14 with 0RF for Marcin Joyce (: 1940). Scheduled to start Velcade/Dex/Lenalidomide.  Attached is his insurance information and demographic data.   Earl if you could forward along the most recent note from Dr. Turner. Please let us know if you need anything additional. Please advise of copay and delivery.   Thanks for your assistance  17 @ 4:14-Thank you for referring Mr. Joyce’ Revlimid to [facility]. I’ll forward his information now.  17 @ 10:22 am - I wanted to forward along the requested clinical notes on Marcin Joyce (:1940) as Earl is out of the office this morning. Please let me know if you need anything additional for your team to begin the PA process. Thanks  17 @ 10:25 am - Great - thank you Marine. I’ll forward to [facility] immediately.  17 @ 11:31-Marcin Joyce’ prior auth was approved and the patient has a very high copay after entering the donut hole of their coverage.   [facility] will begin enrolling Mr. Joyce in [facility].   9/15/17 @ 8:30 I emailed office notes- I was out of the office 9/14  9/15/17 @ 8:34 am - No worries Earl Hawthorne sent them to us yesterday. Mr. Joyce’ PA has been approved and he’s receiving his Revlimid this morning. J This was sent from [facility] yesterday…  9/15/17 @ 8:51 called [employer]- S/w Petty will refax form- I received filled out placed in [employer] box   HOLD ON CLOSE until we receive PAF form from [employer]arette

## 2017-09-18 ENCOUNTER — TELEPHONE (OUTPATIENT)
Dept: ONCOLOGY | Facility: HOSPITAL | Age: 77
End: 2017-09-18

## 2017-09-18 LAB
ALBUMIN 24H MFR UR ELPH: 31.1 %
ALPHA1 GLOB 24H MFR UR ELPH: 6 %
ALPHA2 GLOB 24H MFR UR ELPH: 15.1 %
B-GLOBULIN MFR UR ELPH: 20.2 %
GAMMA GLOB 24H MFR UR ELPH: 27.5 %
HIV 1 & 2 AB SER-IMP: ABNORMAL
INTERPRETATION UR IFE-IMP: ABNORMAL
M PROTEIN 24H MFR UR ELPH: ABNORMAL %
PROT 24H UR-MRATE: 234 MG/24 HR (ref 30–150)
PROT UR-MCNC: 11.7 MG/DL

## 2017-09-18 NOTE — TELEPHONE ENCOUNTER
Returned patient call - Pt reports symptoms have since resolved with no additional complaints or concerns. Likely related to seasonal allergies per patient report. Provide my name and contact information with instructions to call with any additional questions or concerns.     ----- Message from Yael Enriquez sent at 9/15/2017  1:31 PM EDT -----  Regarding: GOTANYAANN-SNEEZING  Contact: [phone]  Patient called he is sneezing, nose is running wants to know if this is a reaction? Please call.

## 2017-09-21 ENCOUNTER — INFUSION (OUTPATIENT)
Dept: ONCOLOGY | Facility: HOSPITAL | Age: 77
End: 2017-09-21

## 2017-09-21 ENCOUNTER — LAB (OUTPATIENT)
Dept: LAB | Facility: HOSPITAL | Age: 77
End: 2017-09-21

## 2017-09-21 ENCOUNTER — OFFICE VISIT (OUTPATIENT)
Dept: ONCOLOGY | Facility: CLINIC | Age: 77
End: 2017-09-21

## 2017-09-21 ENCOUNTER — DOCUMENTATION (OUTPATIENT)
Dept: NUTRITION | Facility: HOSPITAL | Age: 77
End: 2017-09-21

## 2017-09-21 ENCOUNTER — DOCUMENTATION (OUTPATIENT)
Dept: ONCOLOGY | Facility: CLINIC | Age: 77
End: 2017-09-21

## 2017-09-21 VITALS
DIASTOLIC BLOOD PRESSURE: 69 MMHG | HEART RATE: 57 BPM | TEMPERATURE: 97.8 F | BODY MASS INDEX: 24.52 KG/M2 | HEIGHT: 73 IN | WEIGHT: 185 LBS | RESPIRATION RATE: 16 BRPM | SYSTOLIC BLOOD PRESSURE: 167 MMHG

## 2017-09-21 DIAGNOSIS — C90.30 PLASMACYTOMA (HCC): Primary | ICD-10-CM

## 2017-09-21 DIAGNOSIS — C90.00 MULTIPLE MYELOMA NOT HAVING ACHIEVED REMISSION (HCC): Primary | ICD-10-CM

## 2017-09-21 DIAGNOSIS — C90.00 MULTIPLE MYELOMA NOT HAVING ACHIEVED REMISSION (HCC): ICD-10-CM

## 2017-09-21 LAB
ALBUMIN SERPL-MCNC: 3.7 G/DL (ref 3.2–4.8)
ALBUMIN/GLOB SERPL: 1.3 G/DL (ref 1.5–2.5)
ALP SERPL-CCNC: 59 U/L (ref 25–100)
ALT SERPL W P-5'-P-CCNC: 18 U/L (ref 7–40)
ANION GAP SERPL CALCULATED.3IONS-SCNC: 1 MMOL/L (ref 3–11)
AST SERPL-CCNC: 16 U/L (ref 0–33)
BILIRUB SERPL-MCNC: 0.6 MG/DL (ref 0.3–1.2)
BUN BLD-MCNC: 18 MG/DL (ref 9–23)
BUN/CREAT SERPL: 20 (ref 7–25)
CALCIUM SPEC-SCNC: 8.9 MG/DL (ref 8.7–10.4)
CHLORIDE SERPL-SCNC: 107 MMOL/L (ref 99–109)
CO2 SERPL-SCNC: 28 MMOL/L (ref 20–31)
CREAT BLD-MCNC: 0.9 MG/DL (ref 0.6–1.3)
ERYTHROCYTE [DISTWIDTH] IN BLOOD BY AUTOMATED COUNT: 17.3 % (ref 11.3–14.5)
GFR SERPL CREATININE-BSD FRML MDRD: 81 ML/MIN/1.73
GLOBULIN UR ELPH-MCNC: 2.9 GM/DL
GLUCOSE BLD-MCNC: 169 MG/DL (ref 70–100)
HCT VFR BLD AUTO: 29.7 % (ref 38.9–50.9)
HGB BLD-MCNC: 9.7 G/DL (ref 13.1–17.5)
LYMPHOCYTES # BLD AUTO: 0.9 10*3/MM3 (ref 0.6–4.8)
LYMPHOCYTES NFR BLD AUTO: 34.5 % (ref 24–44)
MAGNESIUM SERPL-MCNC: 2.1 MG/DL (ref 1.3–2.7)
MCH RBC QN AUTO: 34.1 PG (ref 27–31)
MCHC RBC AUTO-ENTMCNC: 32.5 G/DL (ref 32–36)
MCV RBC AUTO: 105 FL (ref 80–99)
MONOCYTES # BLD AUTO: 0.1 10*3/MM3 (ref 0–1)
MONOCYTES NFR BLD AUTO: 3.5 % (ref 0–12)
NEUTROPHILS # BLD AUTO: 1.7 10*3/MM3 (ref 1.5–8.3)
NEUTROPHILS NFR BLD AUTO: 62 % (ref 41–71)
PHOSPHATE SERPL-MCNC: 2.5 MG/DL (ref 2.4–5.1)
PLATELET # BLD AUTO: 122 10*3/MM3 (ref 150–450)
PMV BLD AUTO: 7.2 FL (ref 6–12)
POTASSIUM BLD-SCNC: 4.2 MMOL/L (ref 3.5–5.5)
PROT SERPL-MCNC: 6.6 G/DL (ref 5.7–8.2)
RBC # BLD AUTO: 2.83 10*6/MM3 (ref 4.2–5.76)
SODIUM BLD-SCNC: 136 MMOL/L (ref 132–146)
WBC NRBC COR # BLD: 2.7 10*3/MM3 (ref 3.5–10.8)

## 2017-09-21 PROCEDURE — 84100 ASSAY OF PHOSPHORUS: CPT | Performed by: INTERNAL MEDICINE

## 2017-09-21 PROCEDURE — 25010000002 BORTEZOMIB PER 0.1 MG: Performed by: INTERNAL MEDICINE

## 2017-09-21 PROCEDURE — 85025 COMPLETE CBC W/AUTO DIFF WBC: CPT

## 2017-09-21 PROCEDURE — 83735 ASSAY OF MAGNESIUM: CPT | Performed by: INTERNAL MEDICINE

## 2017-09-21 PROCEDURE — 96401 CHEMO ANTI-NEOPL SQ/IM: CPT

## 2017-09-21 PROCEDURE — 36415 COLL VENOUS BLD VENIPUNCTURE: CPT

## 2017-09-21 PROCEDURE — 80053 COMPREHEN METABOLIC PANEL: CPT | Performed by: INTERNAL MEDICINE

## 2017-09-21 PROCEDURE — 99214 OFFICE O/P EST MOD 30 MIN: CPT | Performed by: INTERNAL MEDICINE

## 2017-09-21 RX ORDER — BORTEZOMIB 3.5 MG/1
1.3 INJECTION, POWDER, LYOPHILIZED, FOR SOLUTION INTRAVENOUS; SUBCUTANEOUS ONCE
Status: COMPLETED | OUTPATIENT
Start: 2017-09-21 | End: 2017-09-21

## 2017-09-21 RX ORDER — BORTEZOMIB 3.5 MG/1
1.3 INJECTION, POWDER, LYOPHILIZED, FOR SOLUTION INTRAVENOUS; SUBCUTANEOUS ONCE
Status: CANCELLED | OUTPATIENT
Start: 2017-10-05

## 2017-09-21 RX ORDER — BORTEZOMIB 3.5 MG/1
1.3 INJECTION, POWDER, LYOPHILIZED, FOR SOLUTION INTRAVENOUS; SUBCUTANEOUS ONCE
Status: CANCELLED | OUTPATIENT
Start: 2017-09-21

## 2017-09-21 RX ORDER — BORTEZOMIB 3.5 MG/1
1.3 INJECTION, POWDER, LYOPHILIZED, FOR SOLUTION INTRAVENOUS; SUBCUTANEOUS ONCE
Status: CANCELLED | OUTPATIENT
Start: 2017-09-28

## 2017-09-21 RX ADMIN — BORTEZOMIB 2.7 MG: 3.5 INJECTION, POWDER, LYOPHILIZED, FOR SOLUTION INTRAVENOUS; SUBCUTANEOUS at 11:45

## 2017-09-21 NOTE — PROGRESS NOTES
9/21/2017  Rec'd Claim Form for patient.  [place]- 9/21/2017  $15 fee collected.    Completed form, attached MR and put in MD box for signature.    9/22/2017  Rec'd form with MD signature.  Mailed to pt, per request, address on file.

## 2017-09-21 NOTE — PROGRESS NOTES
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  Presenting with symptomatic pancytopenia    PROBLEM LIST   Patient Active Problem List   Diagnosis   • CAD (coronary artery disease)   • Dyslipidemia   • Right hip pain   • Arthritis   • Cellulitis of left lower extremity   • Left carotid bruit   • Plasmacytoma   • Brain cancer   • Neck pain   • Anemia   • Multiple myeloma       HISTORY OF PRESENT ILLNESS:   Patient is feeling relatively well.  He was transfused with 2 units of blood last week which helped.  He also started his weekly dexamethasone 40 mg once every week and he had no issues with that.  His appetite is good.  He's had no night sweats or fevers.    Past Medical History, Past Surgical History, Social History, Family History have been reviewed and are without significant changes except as mentioned.      Medications:      Current Outpatient Prescriptions:   •  acyclovir (ZOVIRAX) 400 MG tablet, Take 1 tablet by mouth 2 (Two) Times a Day., Disp: 60 tablet, Rfl: 7  •  allopurinol (ZYLOPRIM) 300 MG tablet, Take 1 tablet by mouth Daily., Disp: 30 tablet, Rfl: 11  •  aspirin 81 MG tablet, Take  by mouth daily., Disp: , Rfl:   •  atorvastatin (LIPITOR) 20 MG tablet, Take  by mouth. 1 QHS, Disp: , Rfl:   •  dexamethasone (DECADRON) 4 MG tablet, Take 10 tablets by mouth Daily With Breakfast. Take with food on Days 1, 8, 15., Disp: 30 tablet, Rfl: 3  •  ezetimibe (ZETIA) 10 MG tablet, Take 10 mg by mouth Daily., Disp: , Rfl:   •  glimepiride (AMARYL) 2 MG tablet, Take  by mouth daily., Disp: , Rfl:   •  latanoprost (XALATAN) 0.005 % ophthalmic solution, Apply  to eye. qhs, Disp: , Rfl:   •  lenalidomide (REVLIMID) 15 MG capsule, Take 1 capsule by mouth Daily. on days 1-14, followed by 7 days off. [id number], Adult Male, Disp: 14 capsule, Rfl: 0  •  metoprolol succinate XL (TOPROL-XL) 25 MG 24 hr tablet, Take 1 tablet by mouth daily. 1 QD, Disp: 90  "tablet, Rfl: 3  •  nitroglycerin (NITROSTAT) 0.4 MG SL tablet, Place 1 tablet under the tongue every 5 (five) minutes as needed for chest pain., Disp: 25 tablet, Rfl: 6  •  ondansetron (ZOFRAN) 8 MG tablet, Take 1 tablet by mouth 3 (Three) Times a Day As Needed for Nausea or Vomiting., Disp: 30 tablet, Rfl: 5  •  ramipril (ALTACE) 5 MG capsule, Take  by mouth. 1 QD, Disp: , Rfl:   •  sulfamethoxazole-trimethoprim (BACTRIM DS) 800-160 MG per tablet, 1 tab PO 3x/week on M-W-F, Disp: 12 tablet, Rfl: 5    ALLERGIES:    Allergies   Allergen Reactions   • Penicillins    • Simvastatin    • Voltaren [Diclofenac Sodium]        ROS:  Review of Systems   Constitutional: Negative for activity change and appetite change.   HENT: Negative for mouth sores, sinus pressure and voice change.    Eyes: Negative for visual disturbance.   Respiratory: Negative for shortness of breath.    Cardiovascular: Negative for chest pain.   Gastrointestinal: Negative for abdominal pain and vomiting.   Genitourinary: Negative for dysuria.   Musculoskeletal: Negative for arthralgias and myalgias.   Skin: Negative for color change.   Neurological: Negative for dizziness, syncope and headaches.   Hematological: Negative for adenopathy.   Psychiatric/Behavioral: Negative for confusion, sleep disturbance and suicidal ideas. The patient is not nervous/anxious.            Objective:    /69  Pulse 57  Temp 97.8 °F (36.6 °C)  Resp 16  Ht 73\" (185.4 cm)  Wt 185 lb (83.9 kg)  BMI 24.41 kg/m2    Physical Exam   Constitutional: He is oriented to person, place, and time. He appears well-developed and well-nourished. No distress.   Although he is just a little pale, he otherwise appears very youthful robust and healthy   HENT:   Head: Normocephalic.   Mouth/Throat: Oropharynx is clear and moist.   Eyes: Conjunctivae and EOM are normal. No scleral icterus.   Neck: Normal range of motion. Neck supple. No thyromegaly present.   Cardiovascular: Normal " rate, regular rhythm and normal heart sounds.    No murmur heard.  Pulmonary/Chest: Effort normal and breath sounds normal. He has no wheezes. He has no rales.   Abdominal: Soft. Bowel sounds are normal. He exhibits no distension and no mass. There is no tenderness.   Musculoskeletal: He exhibits no edema or tenderness.   Lymphadenopathy:     He has no cervical adenopathy.   Neurological: He is alert and oriented to person, place, and time. He displays normal reflexes. No cranial nerve deficit.   Skin: Skin is warm and dry. No rash noted.   Psychiatric: He has a normal mood and affect. Judgment normal.   Vitals reviewed.             RECENT LABS:  Hematology WBC   Date Value Ref Range Status   09/21/2017 2.70 (L) 3.50 - 10.80 10*3/mm3 Final     Hemoglobin   Date Value Ref Range Status   09/21/2017 9.7 (L) 13.1 - 17.5 g/dL Final     Hematocrit   Date Value Ref Range Status   09/21/2017 29.7 (L) 38.9 - 50.9 % Final     MCV   Date Value Ref Range Status   09/21/2017 105.0 (H) 80.0 - 99.0 fL Final     RDW   Date Value Ref Range Status   09/21/2017 17.3 (H) 11.3 - 14.5 % Final     MPV   Date Value Ref Range Status   09/21/2017 7.2 6.0 - 12.0 fL Final     Platelets   Date Value Ref Range Status   09/21/2017 122 (L) 150 - 450 10*3/mm3 Final     Comment:     Verified by repeat analysis.      Immature Grans %   Date Value Ref Range Status   09/12/2017 0.3 0.0 - 0.6 % Final     Neutrophils, Absolute   Date Value Ref Range Status   09/21/2017 1.70 1.50 - 8.30 10*3/mm3 Final     Lymphocytes, Absolute   Date Value Ref Range Status   09/21/2017 0.90 0.60 - 4.80 10*3/mm3 Final     Monocytes, Absolute   Date Value Ref Range Status   09/21/2017 0.10 0.00 - 1.00 10*3/mm3 Final     Eosinophils, Absolute   Date Value Ref Range Status   09/12/2017 0.03 0.00 - 0.30 10*3/mm3 Final     Basophils, Absolute   Date Value Ref Range Status   09/12/2017 0.00 0.00 - 0.20 10*3/mm3 Final     Immature Grans, Absolute   Date Value Ref Range Status    09/12/2017 0.01 0.00 - 0.03 10*3/mm3 Final     nRBC   Date Value Ref Range Status   03/19/2015 0.0  Final       Glucose   Date Value Ref Range Status   09/21/2017 169 (H) 70 - 100 mg/dL Final     Sodium   Date Value Ref Range Status   09/21/2017 136 132 - 146 mmol/L Final     Potassium   Date Value Ref Range Status   09/21/2017 4.2 3.5 - 5.5 mmol/L Final     CO2   Date Value Ref Range Status   09/21/2017 28.0 20.0 - 31.0 mmol/L Final     Chloride   Date Value Ref Range Status   09/21/2017 107 99 - 109 mmol/L Final     Anion Gap   Date Value Ref Range Status   09/21/2017 1.0 (L) 3.0 - 11.0 mmol/L Final     Creatinine   Date Value Ref Range Status   09/21/2017 0.90 0.60 - 1.30 mg/dL Final   01/12/2017 0.80 0.60 - 1.30 mg/dL Final     Comment:     Serial Number: [id number]    : [id number]     BUN   Date Value Ref Range Status   09/21/2017 18 9 - 23 mg/dL Final     BUN/Creatinine Ratio   Date Value Ref Range Status   09/21/2017 20.0 7.0 - 25.0 Final     Calcium   Date Value Ref Range Status   09/21/2017 8.9 8.7 - 10.4 mg/dL Final     eGFR Non  Amer   Date Value Ref Range Status   09/21/2017 81 >60 mL/min/1.73 Final     Alkaline Phosphatase   Date Value Ref Range Status   09/21/2017 59 25 - 100 U/L Final     Total Protein   Date Value Ref Range Status   09/21/2017 6.6 5.7 - 8.2 g/dL Final     ALT (SGPT)   Date Value Ref Range Status   09/21/2017 18 7 - 40 U/L Final     AST (SGOT)   Date Value Ref Range Status   09/21/2017 16 0 - 33 U/L Final     Total Bilirubin   Date Value Ref Range Status   09/21/2017 0.6 0.3 - 1.2 mg/dL Final     Albumin   Date Value Ref Range Status   09/21/2017 3.70 3.20 - 4.80 g/dL Final     Globulin   Date Value Ref Range Status   09/21/2017 2.9 gm/dL Final     A/G Ratio   Date Value Ref Range Status   09/21/2017 1.3 (L) 1.5 - 2.5 g/dL Final          Perf Status:1    Assessment/Plan    Diagnoses and all orders for this visit:    Plasmacytoma    Multiple myeloma not having achieved  remission      [place] doing well.  He starts his Revlimid now along with his Velcade.  I'll see him back in several weeks.  I'll follow his labs weekly.    His fish panel shows some chromosomal abnormalities that Dr. Chuck Mcallister is investigating regarding prognostic significance.  I spent more than 30 minutes with the patient and his daughter      Naresh Turner MD , 9/21/2017    CC:

## 2017-09-28 ENCOUNTER — INFUSION (OUTPATIENT)
Dept: ONCOLOGY | Facility: HOSPITAL | Age: 77
End: 2017-09-28

## 2017-09-28 VITALS
HEIGHT: 73 IN | RESPIRATION RATE: 16 BRPM | BODY MASS INDEX: 24.12 KG/M2 | SYSTOLIC BLOOD PRESSURE: 146 MMHG | DIASTOLIC BLOOD PRESSURE: 66 MMHG | HEART RATE: 77 BPM | WEIGHT: 182 LBS | TEMPERATURE: 97.9 F

## 2017-09-28 DIAGNOSIS — C90.00 MULTIPLE MYELOMA NOT HAVING ACHIEVED REMISSION (HCC): Primary | ICD-10-CM

## 2017-09-28 LAB
ALBUMIN SERPL-MCNC: 3.8 G/DL (ref 3.2–4.8)
ALBUMIN/GLOB SERPL: 1.3 G/DL (ref 1.5–2.5)
ALP SERPL-CCNC: 64 U/L (ref 25–100)
ALT SERPL W P-5'-P-CCNC: 18 U/L (ref 7–40)
ANION GAP SERPL CALCULATED.3IONS-SCNC: 4 MMOL/L (ref 3–11)
AST SERPL-CCNC: 14 U/L (ref 0–33)
BILIRUB SERPL-MCNC: 0.7 MG/DL (ref 0.3–1.2)
BUN BLD-MCNC: 16 MG/DL (ref 9–23)
BUN/CREAT SERPL: 16 (ref 7–25)
CALCIUM SPEC-SCNC: 8.8 MG/DL (ref 8.7–10.4)
CHLORIDE SERPL-SCNC: 102 MMOL/L (ref 99–109)
CO2 SERPL-SCNC: 25 MMOL/L (ref 20–31)
CREAT BLD-MCNC: 1 MG/DL (ref 0.6–1.3)
CREAT BLDA-MCNC: 0.9 MG/DL (ref 0.6–1.3)
CREAT BLDA-MCNC: 0.9 MG/DL (ref 0.6–1.3)
ERYTHROCYTE [DISTWIDTH] IN BLOOD BY AUTOMATED COUNT: 17.9 % (ref 11.3–14.5)
GFR SERPL CREATININE-BSD FRML MDRD: 72 ML/MIN/1.73
GLOBULIN UR ELPH-MCNC: 3 GM/DL
GLUCOSE BLD-MCNC: 246 MG/DL (ref 70–100)
HCT VFR BLD AUTO: 28 % (ref 38.9–50.9)
HGB BLD-MCNC: 9.2 G/DL (ref 13.1–17.5)
LYMPHOCYTES # BLD AUTO: 0.5 10*3/MM3 (ref 0.6–4.8)
LYMPHOCYTES NFR BLD AUTO: 16.6 % (ref 24–44)
MCH RBC QN AUTO: 34.8 PG (ref 27–31)
MCHC RBC AUTO-ENTMCNC: 33 G/DL (ref 32–36)
MCV RBC AUTO: 105.4 FL (ref 80–99)
MONOCYTES # BLD AUTO: 0.1 10*3/MM3 (ref 0–1)
MONOCYTES NFR BLD AUTO: 2.7 % (ref 0–12)
NEUTROPHILS # BLD AUTO: 2.7 10*3/MM3 (ref 1.5–8.3)
NEUTROPHILS NFR BLD AUTO: 80.7 % (ref 41–71)
PLATELET # BLD AUTO: 110 10*3/MM3 (ref 150–450)
PMV BLD AUTO: 7.9 FL (ref 6–12)
POTASSIUM BLD-SCNC: 4.3 MMOL/L (ref 3.5–5.5)
PROT SERPL-MCNC: 6.8 G/DL (ref 5.7–8.2)
RBC # BLD AUTO: 2.66 10*6/MM3 (ref 4.2–5.76)
SODIUM BLD-SCNC: 131 MMOL/L (ref 132–146)
WBC NRBC COR # BLD: 3.3 10*3/MM3 (ref 3.5–10.8)

## 2017-09-28 PROCEDURE — 85025 COMPLETE CBC W/AUTO DIFF WBC: CPT

## 2017-09-28 PROCEDURE — 25010000002 ZOLEDRONIC ACID PER 1 MG: Performed by: INTERNAL MEDICINE

## 2017-09-28 PROCEDURE — 36415 COLL VENOUS BLD VENIPUNCTURE: CPT

## 2017-09-28 PROCEDURE — 80053 COMPREHEN METABOLIC PANEL: CPT

## 2017-09-28 PROCEDURE — 25010000002 BORTEZOMIB PER 0.1 MG: Performed by: INTERNAL MEDICINE

## 2017-09-28 PROCEDURE — 96401 CHEMO ANTI-NEOPL SQ/IM: CPT

## 2017-09-28 PROCEDURE — 96374 THER/PROPH/DIAG INJ IV PUSH: CPT

## 2017-09-28 PROCEDURE — 82565 ASSAY OF CREATININE: CPT

## 2017-09-28 RX ORDER — SODIUM CHLORIDE 9 MG/ML
250 INJECTION, SOLUTION INTRAVENOUS ONCE
Status: DISCONTINUED | OUTPATIENT
Start: 2017-09-28 | End: 2017-09-28 | Stop reason: HOSPADM

## 2017-09-28 RX ORDER — BORTEZOMIB 3.5 MG/1
1.3 INJECTION, POWDER, LYOPHILIZED, FOR SOLUTION INTRAVENOUS; SUBCUTANEOUS ONCE
Status: COMPLETED | OUTPATIENT
Start: 2017-09-28 | End: 2017-09-28

## 2017-09-28 RX ADMIN — BORTEZOMIB 2.7 MG: 3.5 INJECTION, POWDER, LYOPHILIZED, FOR SOLUTION INTRAVENOUS; SUBCUTANEOUS at 11:42

## 2017-09-28 RX ADMIN — ZOLEDRONIC ACID 4 MG: 4 INJECTION, SOLUTION, CONCENTRATE INTRAVENOUS at 11:42

## 2017-09-28 NOTE — PATIENT INSTRUCTIONS
Zoledronic Acid injection (Hypercalcemia, Oncology)  What is this medicine?  ZOLEDRONIC ACID (REJI le donald ik AS id) lowers the amount of calcium loss from bone. It is used to treat too much calcium in your blood from cancer. It is also used to prevent complications of cancer that has spread to the bone.  This medicine may be used for other purposes; ask your health care provider or pharmacist if you have questions.  COMMON BRAND NAME(S): Zometa  What should I tell my health care provider before I take this medicine?  They need to know if you have any of these conditions:  -aspirin-sensitive asthma  -cancer, especially if you are receiving medicines used to treat cancer  -dental disease or wear dentures  -infection  -kidney disease  -receiving corticosteroids like dexamethasone or prednisone  -an unusual or allergic reaction to zoledronic acid, other medicines, foods, dyes, or preservatives  -pregnant or trying to get pregnant  -breast-feeding  How should I use this medicine?  This medicine is for infusion into a vein. It is given by a health care professional in a hospital or clinic setting.  Talk to your pediatrician regarding the use of this medicine in children. Special care may be needed.  Overdosage: If you think you have taken too much of this medicine contact a poison control center or emergency room at once.  NOTE: This medicine is only for you. Do not share this medicine with others.  What if I miss a dose?  It is important not to miss your dose. Call your doctor or health care professional if you are unable to keep an appointment.  What may interact with this medicine?  -certain antibiotics given by injection  -NSAIDs, medicines for pain and inflammation, like ibuprofen or naproxen  -some diuretics like bumetanide, furosemide  -teriparatide  -thalidomide  This list may not describe all possible interactions. Give your health care provider a list of all the medicines, herbs, non-prescription drugs, or  dietary supplements you use. Also tell them if you smoke, drink alcohol, or use illegal drugs. Some items may interact with your medicine.  What should I watch for while using this medicine?  Visit your doctor or health care professional for regular checkups. It may be some time before you see the benefit from this medicine. Do not stop taking your medicine unless your doctor tells you to. Your doctor may order blood tests or other tests to see how you are doing.  Women should inform their doctor if they wish to become pregnant or think they might be pregnant. There is a potential for serious side effects to an unborn child. Talk to your health care professional or pharmacist for more information.  You should make sure that you get enough calcium and vitamin D while you are taking this medicine. Discuss the foods you eat and the vitamins you take with your health care professional.  Some people who take this medicine have severe bone, joint, and/or muscle pain. This medicine may also increase your risk for jaw problems or a broken thigh bone. Tell your doctor right away if you have severe pain in your jaw, bones, joints, or muscles. Tell your doctor if you have any pain that does not go away or that gets worse.  Tell your dentist and dental surgeon that you are taking this medicine. You should not have major dental surgery while on this medicine. See your dentist to have a dental exam and fix any dental problems before starting this medicine. Take good care of your teeth while on this medicine. Make sure you see your dentist for regular follow-up appointments.  What side effects may I notice from receiving this medicine?  Side effects that you should report to your doctor or health care professional as soon as possible:  -allergic reactions like skin rash, itching or hives, swelling of the face, lips, or tongue  -anxiety, confusion, or depression  -breathing problems  -changes in vision  -eye pain  -feeling faint or  lightheaded, falls  -jaw pain, especially after dental work  -mouth sores  -muscle cramps, stiffness, or weakness  -redness, blistering, peeling or loosening of the skin, including inside the mouth  -trouble passing urine or change in the amount of urine  Side effects that usually do not require medical attention (report to your doctor or health care professional if they continue or are bothersome):  -bone, joint, or muscle pain  -constipation  -diarrhea  -fever  -hair loss  -irritation at site where injected  -loss of appetite  -nausea, vomiting  -stomach upset  -trouble sleeping  -trouble swallowing  -weak or tired  This list may not describe all possible side effects. Call your doctor for medical advice about side effects. You may report side effects to FDA at 6-773-FDA-0272.  Where should I keep my medicine?  This drug is given in a hospital or clinic and will not be stored at home.  NOTE: This sheet is a summary. It may not cover all possible information. If you have questions about this medicine, talk to your doctor, pharmacist, or health care provider.     © 2017, [employer]. (2015-05-16 14:19:39)  Zoledronic Acid injection (Hypercalcemia, Oncology)  What is this medicine?  ZOLEDRONIC ACID (REJI da bennett ik AS id) lowers the amount of calcium loss from bone. It is used to treat too much calcium in your blood from cancer. It is also used to prevent complications of cancer that has spread to the bone.  This medicine may be used for other purposes; ask your health care provider or pharmacist if you have questions.  COMMON BRAND NAME(S): Zometa  What should I tell my health care provider before I take this medicine?  They need to know if you have any of these conditions:  -aspirin-sensitive asthma  -cancer, especially if you are receiving medicines used to treat cancer  -dental disease or wear dentures  -infection  -kidney disease  -receiving corticosteroids like dexamethasone or prednisone  -an unusual  or allergic reaction to zoledronic acid, other medicines, foods, dyes, or preservatives  -pregnant or trying to get pregnant  -breast-feeding  How should I use this medicine?  This medicine is for infusion into a vein. It is given by a health care professional in a hospital or clinic setting.  Talk to your pediatrician regarding the use of this medicine in children. Special care may be needed.  Overdosage: If you think you have taken too much of this medicine contact a poison control center or emergency room at once.  NOTE: This medicine is only for you. Do not share this medicine with others.  What if I miss a dose?  It is important not to miss your dose. Call your doctor or health care professional if you are unable to keep an appointment.  What may interact with this medicine?  -certain antibiotics given by injection  -NSAIDs, medicines for pain and inflammation, like ibuprofen or naproxen  -some diuretics like bumetanide, furosemide  -teriparatide  -thalidomide  This list may not describe all possible interactions. Give your health care provider a list of all the medicines, herbs, non-prescription drugs, or dietary supplements you use. Also tell them if you smoke, drink alcohol, or use illegal drugs. Some items may interact with your medicine.  What should I watch for while using this medicine?  Visit your doctor or health care professional for regular checkups. It may be some time before you see the benefit from this medicine. Do not stop taking your medicine unless your doctor tells you to. Your doctor may order blood tests or other tests to see how you are doing.  Women should inform their doctor if they wish to become pregnant or think they might be pregnant. There is a potential for serious side effects to an unborn child. Talk to your health care professional or pharmacist for more information.  You should make sure that you get enough calcium and vitamin D while you are taking this medicine. Discuss the  foods you eat and the vitamins you take with your health care professional.  Some people who take this medicine have severe bone, joint, and/or muscle pain. This medicine may also increase your risk for jaw problems or a broken thigh bone. Tell your doctor right away if you have severe pain in your jaw, bones, joints, or muscles. Tell your doctor if you have any pain that does not go away or that gets worse.  Tell your dentist and dental surgeon that you are taking this medicine. You should not have major dental surgery while on this medicine. See your dentist to have a dental exam and fix any dental problems before starting this medicine. Take good care of your teeth while on this medicine. Make sure you see your dentist for regular follow-up appointments.  What side effects may I notice from receiving this medicine?  Side effects that you should report to your doctor or health care professional as soon as possible:  -allergic reactions like skin rash, itching or hives, swelling of the face, lips, or tongue  -anxiety, confusion, or depression  -breathing problems  -changes in vision  -eye pain  -feeling faint or lightheaded, falls  -jaw pain, especially after dental work  -mouth sores  -muscle cramps, stiffness, or weakness  -redness, blistering, peeling or loosening of the skin, including inside the mouth  -trouble passing urine or change in the amount of urine  Side effects that usually do not require medical attention (report to your doctor or health care professional if they continue or are bothersome):  -bone, joint, or muscle pain  -constipation  -diarrhea  -fever  -hair loss  -irritation at site where injected  -loss of appetite  -nausea, vomiting  -stomach upset  -trouble sleeping  -trouble swallowing  -weak or tired  This list may not describe all possible side effects. Call your doctor for medical advice about side effects. You may report side effects to FDA at 3-911-FDA-2507.  Where should I keep my  medicine?  This drug is given in a hospital or clinic and will not be stored at home.  NOTE: This sheet is a summary. It may not cover all possible information. If you have questions about this medicine, talk to your doctor, pharmacist, or health care provider.     © 2017, [employer] Standard. (2015-05-16 14:19:39)

## 2017-10-03 ENCOUNTER — SPECIALTY PHARMACY (OUTPATIENT)
Dept: ONCOLOGY | Facility: HOSPITAL | Age: 77
End: 2017-10-03

## 2017-10-03 DIAGNOSIS — C90.00 MULTIPLE MYELOMA NOT HAVING ACHIEVED REMISSION (HCC): Primary | ICD-10-CM

## 2017-10-03 RX ORDER — LENALIDOMIDE 15 MG/1
15 CAPSULE ORAL DAILY
Qty: 14 CAPSULE | Refills: 0 | Status: SHIPPED | OUTPATIENT
Start: 2017-10-03 | End: 2018-01-18

## 2017-10-03 NOTE — PROGRESS NOTES
Oral Chemotherapy Teaching      Patient Name/:  Marcin Joyce   1940  Oral Chemotherapy Regimen:  Lenalidomide 15mg PO daily on days 1-14, followed by 7 days off + Dex 40mg weekly + Velcade weekly  Date Started Medication:Cycle 1 17 (currently Day 13)    Pt reports starting dexamethasone 40mg weekly on 17 as instructed.     Initial Teaching Follow Up Comments     Safety     Storage instructions (away from children; away from heat/cold, sunlight, or moisture), handling - use of gloves (caregivers), washing hands after touching pills, managing waste     “How are you storing your medications?”, reminders on storage, proper handling (caregivers using gloves, washing hands, away from children, managing waste, etc.), disposal of medication with D/C or dosage change    Pt reports appropriate handling and storage of oral chemotherapy. Advised on storing at room temp away from pets or children. Pt to wash hands after handling. Do not share medication. Advised of teratogenic nature of medication. Pt and daughters present and verbalized understanding.      Adherence      patient and/or caregiver on how to take medication, take with/without food, assess their adherence potential, stress importance of adherence, ways to manage adherence (pill boxes, phone reminders, calendars), what to do if miss a dose   “How are you taking your medication?” “How are you remembering to take your medication?”, “How many doses have you missed?”, determine reasons for non-adherence (not remembering, side effects, etc), ways to improve, overadherence? Remind patient of ways to improve/maintain adherence   Reports adherence to  Lenalidomide administered daily at same time for 14 days, followed by 7 days off. Reports he will administer Day 14 dose on 10/4/17 as dictated by calendar. Will submit refill for next cycle of lenalidomide to begin on 10/12/17. Discussed supportive care medications which include: Allopurinol, bactrim,  "Aspirin, Acyclovir, and Zofran. Pt confirms complaince to supportive care medications. Will need new calendar mailed.      Side Effects/Adverse Reactions      patient on potential side effects, s/s, ways to manage, when to call MD/seek help     Determine if patient experiencing side effects, ways to manage  Pt reports tolerating therapy at this time, reports some side effects noted, but \"nothing that I can't deal with\". Pt reports increase in neck and joint pain noted since initiation along with a noticeable cough at times.      Miscellaneous     Food interactions, DDIs, financial issues Determine if patient started any new medications since being placed on oral chemo (analyze for DDI) Will submit script to [facility] for next cycle of lenalidomide scheduled to begin on 10/12/17.       Additional Notes: Reviewed aforementioned material with patient. Reports currently tolerating medication. Submitted refill for next Cycle to [facility].     "

## 2017-10-03 NOTE — PROGRESS NOTES
10/03/2017 @ 8:54-[facility] Yulia,   I received a faxed refill request for Cycle 2 of  Revlimid , thus I have submitted a prescription to [facility] for Lenalidomide 15mg PO on days 1-14, followed by 7 days off   #[facility] 1940[facility] Pt is due to start next cycle on 10/12/17.   Please let us know if you need anything additional.   Thanks for your assistance  Marine

## 2017-10-05 ENCOUNTER — INFUSION (OUTPATIENT)
Dept: ONCOLOGY | Facility: HOSPITAL | Age: 77
End: 2017-10-05

## 2017-10-05 VITALS
HEIGHT: 73 IN | HEART RATE: 73 BPM | TEMPERATURE: 98.7 F | SYSTOLIC BLOOD PRESSURE: 129 MMHG | BODY MASS INDEX: 24.12 KG/M2 | DIASTOLIC BLOOD PRESSURE: 60 MMHG | RESPIRATION RATE: 16 BRPM | WEIGHT: 182 LBS

## 2017-10-05 DIAGNOSIS — C90.00 MULTIPLE MYELOMA NOT HAVING ACHIEVED REMISSION (HCC): Primary | ICD-10-CM

## 2017-10-05 LAB
ERYTHROCYTE [DISTWIDTH] IN BLOOD BY AUTOMATED COUNT: 18 % (ref 11.3–14.5)
HCT VFR BLD AUTO: 26.2 % (ref 38.9–50.9)
HGB BLD-MCNC: 8.6 G/DL (ref 13.1–17.5)
LYMPHOCYTES # BLD AUTO: 0.9 10*3/MM3 (ref 0.6–4.8)
LYMPHOCYTES NFR BLD AUTO: 34.8 % (ref 24–44)
MCH RBC QN AUTO: 34.6 PG (ref 27–31)
MCHC RBC AUTO-ENTMCNC: 32.8 G/DL (ref 32–36)
MCV RBC AUTO: 105.5 FL (ref 80–99)
MONOCYTES # BLD AUTO: 0.1 10*3/MM3 (ref 0–1)
MONOCYTES NFR BLD AUTO: 4.2 % (ref 0–12)
NEUTROPHILS # BLD AUTO: 1.5 10*3/MM3 (ref 1.5–8.3)
NEUTROPHILS NFR BLD AUTO: 61 % (ref 41–71)
PLATELET # BLD AUTO: 53 10*3/MM3 (ref 150–450)
PMV BLD AUTO: 9 FL (ref 6–12)
RBC # BLD AUTO: 2.49 10*6/MM3 (ref 4.2–5.76)
WBC NRBC COR # BLD: 2.5 10*3/MM3 (ref 3.5–10.8)

## 2017-10-05 PROCEDURE — 96401 CHEMO ANTI-NEOPL SQ/IM: CPT

## 2017-10-05 PROCEDURE — 85025 COMPLETE CBC W/AUTO DIFF WBC: CPT

## 2017-10-05 PROCEDURE — 25010000002 BORTEZOMIB PER 0.1 MG: Performed by: INTERNAL MEDICINE

## 2017-10-05 PROCEDURE — 36415 COLL VENOUS BLD VENIPUNCTURE: CPT

## 2017-10-05 RX ORDER — BORTEZOMIB 3.5 MG/1
1.3 INJECTION, POWDER, LYOPHILIZED, FOR SOLUTION INTRAVENOUS; SUBCUTANEOUS ONCE
Status: COMPLETED | OUTPATIENT
Start: 2017-10-05 | End: 2017-10-05

## 2017-10-05 RX ADMIN — BORTEZOMIB 2.7 MG: 3.5 INJECTION, POWDER, LYOPHILIZED, FOR SOLUTION INTRAVENOUS; SUBCUTANEOUS at 11:14

## 2017-10-11 ENCOUNTER — TELEPHONE (OUTPATIENT)
Dept: ONCOLOGY | Facility: CLINIC | Age: 77
End: 2017-10-11

## 2017-10-11 ENCOUNTER — OFFICE VISIT (OUTPATIENT)
Dept: ONCOLOGY | Facility: CLINIC | Age: 77
End: 2017-10-11

## 2017-10-11 ENCOUNTER — HOSPITAL ENCOUNTER (OUTPATIENT)
Dept: MRI IMAGING | Facility: HOSPITAL | Age: 77
Discharge: HOME | End: 2017-10-11
Attending: INTERNAL MEDICINE | Admitting: INTERNAL MEDICINE

## 2017-10-11 VITALS
SYSTOLIC BLOOD PRESSURE: 137 MMHG | BODY MASS INDEX: 23.74 KG/M2 | WEIGHT: 185 LBS | DIASTOLIC BLOOD PRESSURE: 62 MMHG | HEIGHT: 74 IN | TEMPERATURE: 97.2 F | HEART RATE: 69 BPM | RESPIRATION RATE: 16 BRPM

## 2017-10-11 DIAGNOSIS — C90.00 MULTIPLE MYELOMA NOT HAVING ACHIEVED REMISSION (HCC): Primary | ICD-10-CM

## 2017-10-11 DIAGNOSIS — C90.00 MULTIPLE MYELOMA NOT HAVING ACHIEVED REMISSION (HCC): ICD-10-CM

## 2017-10-11 DIAGNOSIS — M54.2 ACUTE NECK PAIN: ICD-10-CM

## 2017-10-11 PROCEDURE — 99213 OFFICE O/P EST LOW 20 MIN: CPT | Performed by: INTERNAL MEDICINE

## 2017-10-11 PROCEDURE — 72156 MRI NECK SPINE W/O & W/DYE: CPT

## 2017-10-11 PROCEDURE — A9577 INJ MULTIHANCE: HCPCS | Performed by: INTERNAL MEDICINE

## 2017-10-11 PROCEDURE — 0 GADOBENATE DIMEGLUMINE 529 MG/ML SOLUTION: Performed by: INTERNAL MEDICINE

## 2017-10-11 RX ADMIN — GADOBENATE DIMEGLUMINE 15 ML: 529 INJECTION, SOLUTION INTRAVENOUS at 16:00

## 2017-10-11 NOTE — TELEPHONE ENCOUNTER
----- Message from Anika Santos sent at 10/11/2017  8:28 AM EDT -----  Regarding: [facility] - PAIN  Contact: [phone]  PATIENT CALLED AND HE SAID DR. LIZARRAGA LEFT HIM A VOICEMAIL. HE SAID HE HAS BEEN HAVING TERRIBLE PAIN IN THE BACK OF HIS NECK AND HEAD. HE CAN'T SLEEP, AND WAS UP ALL NIGHT LAST NIGHT.     IS THERE SOMETHING DR. LIZARRAGA CAN PRESCRIBE TO HELP?    HOME: [phone]

## 2017-10-11 NOTE — PROGRESS NOTES
Chief Complaint   My neck hurts    PROBLEM LIST   Patient Active Problem List   Diagnosis   • CAD (coronary artery disease)   • Dyslipidemia   • Right hip pain   • Arthritis   • Cellulitis of left lower extremity   • Left carotid bruit   • Plasmacytoma   • Brain cancer   • Neck pain   • Anemia   • Multiple myeloma       HISTORY OF PRESENT ILLNESS:   Marcin is here about a day early.  I called him earlier today just to check on him.  I knew that he had an appointment tomorrow.  He didn't answer but he called back and reportedly did he had severe neck pain so I called him back again.  If the pain has been present about 10 days.  It is not progressive.  When he took his last pulse dexamethasone dose last week it resolved for about 36 hours or so.  Its worst when he rotates her nods his head.  He doesn't have any headache.  He doesn't have any neurologic symptoms if he didn't have this he would feel pretty well he's tried some heat but that really hasn't helped much    Past Medical History, Past Surgical History, Social History, Family History have been reviewed and are without significant changes except as mentioned.      Medications:      Current Outpatient Prescriptions:   •  acyclovir (ZOVIRAX) 400 MG tablet, Take 1 tablet by mouth 2 (Two) Times a Day., Disp: 60 tablet, Rfl: 7  •  allopurinol (ZYLOPRIM) 300 MG tablet, Take 1 tablet by mouth Daily., Disp: 30 tablet, Rfl: 11  •  aspirin 81 MG tablet, Take  by mouth daily., Disp: , Rfl:   •  atorvastatin (LIPITOR) 20 MG tablet, Take  by mouth. 1 QHS, Disp: , Rfl:   •  dexamethasone (DECADRON) 4 MG tablet, Take 10 tablets by mouth Daily With Breakfast. Take with food on Days 1, 8, 15., Disp: 30 tablet, Rfl: 3  •  ezetimibe (ZETIA) 10 MG tablet, Take 10 mg by mouth Daily., Disp: , Rfl:   •  glimepiride (AMARYL) 2 MG tablet, Take  by mouth daily., Disp: , Rfl:   •  latanoprost (XALATAN) 0.005 % ophthalmic solution, Apply  to eye. qhs, Disp: , Rfl:   •  lenalidomide  "(REVLIMID) 15 MG capsule, Take 1 capsule by mouth Daily. on days 1-14, followed by 7 days off. REMS [id number], Adult Male, Disp: 14 capsule, Rfl: 0  •  lenalidomide (REVLIMID) 15 MG capsule, Take 1 capsule by mouth Daily. on days 1-14, followed by 7 days off. REMS [id number], Adult Male, Disp: 14 capsule, Rfl: 0  •  metoprolol succinate XL (TOPROL-XL) 25 MG 24 hr tablet, Take 1 tablet by mouth daily. 1 QD, Disp: 90 tablet, Rfl: 3  •  nitroglycerin (NITROSTAT) 0.4 MG SL tablet, Place 1 tablet under the tongue every 5 (five) minutes as needed for chest pain., Disp: 25 tablet, Rfl: 6  •  ondansetron (ZOFRAN) 8 MG tablet, Take 1 tablet by mouth 3 (Three) Times a Day As Needed for Nausea or Vomiting., Disp: 30 tablet, Rfl: 5  •  ramipril (ALTACE) 5 MG capsule, Take  by mouth. 1 QD, Disp: , Rfl:   •  sulfamethoxazole-trimethoprim (BACTRIM DS) 800-160 MG per tablet, 1 tab PO 3x/week on M-W-F, Disp: 12 tablet, Rfl: 5  No current facility-administered medications for this visit.     ALLERGIES:    Allergies   Allergen Reactions   • Penicillins    • Simvastatin    • Voltaren [Diclofenac Sodium]        ROS:  Review of Systems  No new neurologic symptoms.  No new pulmonary GI or  symptoms.  Objective:    /62 Comment: LUE  Pulse 69  Temp 97.2 °F (36.2 °C) (Temporal Artery )   Resp 16  Ht 74\" (188 cm)  Wt 185 lb (83.9 kg)  BMI 23.75 kg/m2    Physical Exam   Constitutional: He is oriented to person, place, and time. He appears well-developed and well-nourished. No distress.   He is a bit pale from his anemia but otherwise in no distress.  He smiles readily.  He is alert and oriented.   HENT:   Head: Normocephalic.   Mouth/Throat: Oropharynx is clear and moist.   Eyes: Conjunctivae and EOM are normal. No scleral icterus.   Neck: Normal range of motion. Neck supple. No thyromegaly present.   Cardiovascular: Normal rate, regular rhythm and normal heart sounds.    No murmur heard.  Pulmonary/Chest: Effort normal and breath " sounds normal. He has no wheezes. He has no rales.   Abdominal: Soft. Bowel sounds are normal. He exhibits no distension and no mass. There is no tenderness.   Musculoskeletal: He exhibits no edema or tenderness.   He has tenderness when I rotate his head to either side or when I have him not.  He doesn't have any crepitus.  He does have some tenderness with some muscle spasm in the posterior neck musculature.  He has no adenopathy palpable.   Lymphadenopathy:     He has no cervical adenopathy.   Neurological: He is alert and oriented to person, place, and time. He displays normal reflexes. No cranial nerve deficit.   Skin: Skin is warm and dry. No rash noted.   Psychiatric: He has a normal mood and affect. Judgment normal.   Vitals reviewed.             RECENT LABS:  Hematology WBC   Date Value Ref Range Status   10/05/2017 2.50 (L) 3.50 - 10.80 10*3/mm3 Final     Hemoglobin   Date Value Ref Range Status   10/05/2017 8.6 (L) 13.1 - 17.5 g/dL Final     Hematocrit   Date Value Ref Range Status   10/05/2017 26.2 (L) 38.9 - 50.9 % Final     MCV   Date Value Ref Range Status   10/05/2017 105.5 (H) 80.0 - 99.0 fL Final     RDW   Date Value Ref Range Status   10/05/2017 18.0 (H) 11.3 - 14.5 % Final     MPV   Date Value Ref Range Status   10/05/2017 9.0 6.0 - 12.0 fL Final     Comment:     Verified by repeat analysis.      Platelets   Date Value Ref Range Status   10/05/2017 53 (L) 150 - 450 10*3/mm3 Final     Comment:     Verified by repeat analysis.      Immature Grans %   Date Value Ref Range Status   09/12/2017 0.3 0.0 - 0.6 % Final     Neutrophils, Absolute   Date Value Ref Range Status   10/05/2017 1.50 1.50 - 8.30 10*3/mm3 Final     Lymphocytes, Absolute   Date Value Ref Range Status   10/05/2017 0.90 0.60 - 4.80 10*3/mm3 Final     Monocytes, Absolute   Date Value Ref Range Status   10/05/2017 0.10 0.00 - 1.00 10*3/mm3 Final     Eosinophils, Absolute   Date Value Ref Range Status   09/12/2017 0.03 0.00 - 0.30  10*3/mm3 Final     Basophils, Absolute   Date Value Ref Range Status   09/12/2017 0.00 0.00 - 0.20 10*3/mm3 Final     Immature Grans, Absolute   Date Value Ref Range Status   09/12/2017 0.01 0.00 - 0.03 10*3/mm3 Final     nRBC   Date Value Ref Range Status   03/19/2015 0.0  Final       Glucose   Date Value Ref Range Status   09/28/2017 246 (H) 70 - 100 mg/dL Final     Sodium   Date Value Ref Range Status   09/28/2017 131 (L) 132 - 146 mmol/L Final     Potassium   Date Value Ref Range Status   09/28/2017 4.3 3.5 - 5.5 mmol/L Final     CO2   Date Value Ref Range Status   09/28/2017 25.0 20.0 - 31.0 mmol/L Final     Chloride   Date Value Ref Range Status   09/28/2017 102 99 - 109 mmol/L Final     Anion Gap   Date Value Ref Range Status   09/28/2017 4.0 3.0 - 11.0 mmol/L Final     Creatinine   Date Value Ref Range Status   09/28/2017 0.90 0.60 - 1.30 mg/dL Final     BUN   Date Value Ref Range Status   09/28/2017 16 9 - 23 mg/dL Final     BUN/Creatinine Ratio   Date Value Ref Range Status   09/28/2017 16.0 7.0 - 25.0 Final     Calcium   Date Value Ref Range Status   09/28/2017 8.8 8.7 - 10.4 mg/dL Final     eGFR Non  Amer   Date Value Ref Range Status   09/28/2017 72 >60 mL/min/1.73 Final     Alkaline Phosphatase   Date Value Ref Range Status   09/28/2017 64 25 - 100 U/L Final     Total Protein   Date Value Ref Range Status   09/28/2017 6.8 5.7 - 8.2 g/dL Final     ALT (SGPT)   Date Value Ref Range Status   09/28/2017 18 7 - 40 U/L Final     AST (SGOT)   Date Value Ref Range Status   09/28/2017 14 0 - 33 U/L Final     Total Bilirubin   Date Value Ref Range Status   09/28/2017 0.7 0.3 - 1.2 mg/dL Final     Albumin   Date Value Ref Range Status   09/28/2017 3.80 3.20 - 4.80 g/dL Final     Globulin   Date Value Ref Range Status   09/28/2017 3.0 gm/dL Final     A/G Ratio   Date Value Ref Range Status   09/28/2017 1.3 (L) 1.5 - 2.5 g/dL Final          Perf Status: Marcin appears to have some muscle spasm of his neck for  what reason I'm not quite sure.  He had an MRI just over an hour ago which did not show anything acute or worrisome for myeloma or any other significant abnormality.    I had him take his dexamethasone a day early so he took an earlier today.  I'm and have him continue a much lower dose for several days and taper and discontinue it over the next 48 hours.  I'll see him back in a couple of weeks and I'll call and check on him tomorrow and probably early next week I'll the above discussed with him.  He'll otherwise continue all of his chemotherapy etc. just as is    Assessment/Plan    Diagnoses and all orders for this visit:    Multiple myeloma not having achieved remission      Please see above for my assessment and my plan.      Naresh Turner MD , 10/11/2017    CC:

## 2017-10-12 ENCOUNTER — INFUSION (OUTPATIENT)
Dept: ONCOLOGY | Facility: HOSPITAL | Age: 77
End: 2017-10-12

## 2017-10-12 ENCOUNTER — DOCUMENTATION (OUTPATIENT)
Dept: NUTRITION | Facility: HOSPITAL | Age: 77
End: 2017-10-12

## 2017-10-12 VITALS
WEIGHT: 184 LBS | TEMPERATURE: 97.7 F | SYSTOLIC BLOOD PRESSURE: 118 MMHG | BODY MASS INDEX: 23.61 KG/M2 | HEIGHT: 74 IN | RESPIRATION RATE: 16 BRPM | HEART RATE: 74 BPM | DIASTOLIC BLOOD PRESSURE: 58 MMHG

## 2017-10-12 DIAGNOSIS — C90.00 MULTIPLE MYELOMA NOT HAVING ACHIEVED REMISSION (HCC): ICD-10-CM

## 2017-10-12 DIAGNOSIS — C90.30 PLASMACYTOMA (HCC): Primary | ICD-10-CM

## 2017-10-12 LAB
ALBUMIN SERPL-MCNC: 3.7 G/DL (ref 3.2–4.8)
ALBUMIN/GLOB SERPL: 1.4 G/DL (ref 1.5–2.5)
ALP SERPL-CCNC: 69 U/L (ref 25–100)
ALT SERPL W P-5'-P-CCNC: 18 U/L (ref 7–40)
ANION GAP SERPL CALCULATED.3IONS-SCNC: 7 MMOL/L (ref 3–11)
AST SERPL-CCNC: 14 U/L (ref 0–33)
BILIRUB SERPL-MCNC: 0.4 MG/DL (ref 0.3–1.2)
BUN BLD-MCNC: 25 MG/DL (ref 9–23)
BUN/CREAT SERPL: 31.3 (ref 7–25)
CALCIUM SPEC-SCNC: 8.6 MG/DL (ref 8.7–10.4)
CHLORIDE SERPL-SCNC: 105 MMOL/L (ref 99–109)
CO2 SERPL-SCNC: 24 MMOL/L (ref 20–31)
CREAT BLD-MCNC: 0.8 MG/DL (ref 0.6–1.3)
CREAT BLDA-MCNC: 0.8 MG/DL (ref 0.6–1.3)
CREAT BLDA-MCNC: 0.8 MG/DL (ref 0.6–1.3)
ERYTHROCYTE [DISTWIDTH] IN BLOOD BY AUTOMATED COUNT: 18.7 % (ref 11.3–14.5)
GFR SERPL CREATININE-BSD FRML MDRD: 93 ML/MIN/1.73
GLOBULIN UR ELPH-MCNC: 2.7 GM/DL
GLUCOSE BLD-MCNC: 170 MG/DL (ref 70–100)
HCT VFR BLD AUTO: 24.5 % (ref 38.9–50.9)
HGB BLD-MCNC: 7.9 G/DL (ref 13.1–17.5)
LYMPHOCYTES # BLD AUTO: 0.9 10*3/MM3 (ref 0.6–4.8)
LYMPHOCYTES NFR BLD AUTO: 27.3 % (ref 24–44)
MCH RBC QN AUTO: 34.6 PG (ref 27–31)
MCHC RBC AUTO-ENTMCNC: 32.5 G/DL (ref 32–36)
MCV RBC AUTO: 106.7 FL (ref 80–99)
MONOCYTES # BLD AUTO: 0.3 10*3/MM3 (ref 0–1)
MONOCYTES NFR BLD AUTO: 8.4 % (ref 0–12)
NEUTROPHILS # BLD AUTO: 2.1 10*3/MM3 (ref 1.5–8.3)
NEUTROPHILS NFR BLD AUTO: 64.3 % (ref 41–71)
PLATELET # BLD AUTO: 45 10*3/MM3 (ref 150–450)
PMV BLD AUTO: 9.9 FL (ref 6–12)
POTASSIUM BLD-SCNC: 4.3 MMOL/L (ref 3.5–5.5)
PROT SERPL-MCNC: 6.4 G/DL (ref 5.7–8.2)
RBC # BLD AUTO: 2.29 10*6/MM3 (ref 4.2–5.76)
SODIUM BLD-SCNC: 136 MMOL/L (ref 132–146)
WBC NRBC COR # BLD: 3.3 10*3/MM3 (ref 3.5–10.8)

## 2017-10-12 PROCEDURE — 36415 COLL VENOUS BLD VENIPUNCTURE: CPT

## 2017-10-12 PROCEDURE — 96401 CHEMO ANTI-NEOPL SQ/IM: CPT

## 2017-10-12 PROCEDURE — 85025 COMPLETE CBC W/AUTO DIFF WBC: CPT

## 2017-10-12 PROCEDURE — 80053 COMPREHEN METABOLIC PANEL: CPT

## 2017-10-12 PROCEDURE — 82565 ASSAY OF CREATININE: CPT

## 2017-10-12 PROCEDURE — 25010000002 BORTEZOMIB PER 0.1 MG: Performed by: INTERNAL MEDICINE

## 2017-10-12 RX ORDER — LENALIDOMIDE 15 MG/1
15 CAPSULE ORAL DAILY
Qty: 14 CAPSULE | Refills: 5 | Status: SHIPPED | OUTPATIENT
Start: 2017-10-12 | End: 2017-10-16 | Stop reason: SDUPTHER

## 2017-10-12 RX ORDER — SODIUM CHLORIDE 9 MG/ML
250 INJECTION, SOLUTION INTRAVENOUS ONCE
Status: CANCELLED | OUTPATIENT
Start: 2017-10-26

## 2017-10-12 RX ORDER — BORTEZOMIB 3.5 MG/1
1.3 INJECTION, POWDER, LYOPHILIZED, FOR SOLUTION INTRAVENOUS; SUBCUTANEOUS ONCE
Status: CANCELLED | OUTPATIENT
Start: 2017-10-12

## 2017-10-12 RX ORDER — BORTEZOMIB 3.5 MG/1
1.3 INJECTION, POWDER, LYOPHILIZED, FOR SOLUTION INTRAVENOUS; SUBCUTANEOUS ONCE
Status: COMPLETED | OUTPATIENT
Start: 2017-10-12 | End: 2017-10-12

## 2017-10-12 RX ORDER — BORTEZOMIB 3.5 MG/1
1.3 INJECTION, POWDER, LYOPHILIZED, FOR SOLUTION INTRAVENOUS; SUBCUTANEOUS ONCE
Status: CANCELLED | OUTPATIENT
Start: 2017-10-26

## 2017-10-12 RX ORDER — BORTEZOMIB 3.5 MG/1
1.3 INJECTION, POWDER, LYOPHILIZED, FOR SOLUTION INTRAVENOUS; SUBCUTANEOUS ONCE
Status: CANCELLED | OUTPATIENT
Start: 2017-11-02

## 2017-10-12 RX ORDER — BORTEZOMIB 3.5 MG/1
1.3 INJECTION, POWDER, LYOPHILIZED, FOR SOLUTION INTRAVENOUS; SUBCUTANEOUS ONCE
Status: CANCELLED | OUTPATIENT
Start: 2017-12-14

## 2017-10-12 RX ORDER — BORTEZOMIB 3.5 MG/1
1.3 INJECTION, POWDER, LYOPHILIZED, FOR SOLUTION INTRAVENOUS; SUBCUTANEOUS ONCE
Status: CANCELLED | OUTPATIENT
Start: 2017-11-16

## 2017-10-12 RX ORDER — BORTEZOMIB 3.5 MG/1
1.3 INJECTION, POWDER, LYOPHILIZED, FOR SOLUTION INTRAVENOUS; SUBCUTANEOUS ONCE
Status: CANCELLED | OUTPATIENT
Start: 2017-10-19

## 2017-10-12 RX ADMIN — BORTEZOMIB 2.7 MG: 3.5 INJECTION, POWDER, LYOPHILIZED, FOR SOLUTION INTRAVENOUS; SUBCUTANEOUS at 11:20

## 2017-10-12 NOTE — PROGRESS NOTES
Oncology Nutrition    Patient Name:  Marcin Joyce  YOB: 1940    Follow up visit with patient during his Velcade appointment.  He reports his appetite has been very good and denies nutritional complaints at this time.  Note his weight has been stable.  He denies nutritional questions or concerns at this time.    Encouraged him to continue with his good oral intake and to call RD if nutritional questions or concerns arise.  Will follow up as indicated.  He voiced understanding of information discussed.    Electronically signed by:  Carli Dozier RD  10/12/17 2:40 PM

## 2017-10-16 ENCOUNTER — TELEPHONE (OUTPATIENT)
Dept: ONCOLOGY | Facility: HOSPITAL | Age: 77
End: 2017-10-16

## 2017-10-16 DIAGNOSIS — D61.810 ANTINEOPLASTIC CHEMOTHERAPY INDUCED PANCYTOPENIA (HCC): ICD-10-CM

## 2017-10-16 DIAGNOSIS — C90.00 MULTIPLE MYELOMA, REMISSION STATUS UNSPECIFIED (HCC): ICD-10-CM

## 2017-10-16 DIAGNOSIS — T45.1X5A ANTINEOPLASTIC CHEMOTHERAPY INDUCED PANCYTOPENIA (HCC): ICD-10-CM

## 2017-10-16 DIAGNOSIS — D64.81 ANEMIA DUE TO ANTINEOPLASTIC CHEMOTHERAPY: ICD-10-CM

## 2017-10-16 DIAGNOSIS — C90.00 MULTIPLE MYELOMA, REMISSION STATUS UNSPECIFIED (HCC): Primary | ICD-10-CM

## 2017-10-16 DIAGNOSIS — C71.9 MALIGNANT NEOPLASM OF BRAIN, UNSPECIFIED LOCATION (HCC): Primary | ICD-10-CM

## 2017-10-16 DIAGNOSIS — T45.1X5A ANEMIA DUE TO ANTINEOPLASTIC CHEMOTHERAPY: ICD-10-CM

## 2017-10-16 RX ORDER — SODIUM CHLORIDE 9 MG/ML
250 INJECTION, SOLUTION INTRAVENOUS AS NEEDED
Status: CANCELLED | OUTPATIENT
Start: 2017-10-16

## 2017-10-16 RX ORDER — DIPHENHYDRAMINE HCL 25 MG
25 CAPSULE ORAL ONCE
Status: CANCELLED | OUTPATIENT
Start: 2017-10-16 | End: 2017-10-16

## 2017-10-16 RX ORDER — ACETAMINOPHEN 325 MG/1
325 TABLET ORAL ONCE
Status: CANCELLED | OUTPATIENT
Start: 2017-10-16 | End: 2017-10-16

## 2017-10-16 NOTE — TELEPHONE ENCOUNTER
Prescription submitted in error to local [facility] on 10/12/17. Called and spoke with [facility] whom inquired of REMS auth and verification. Alerted to cancel. Pt fills Lenalidomide through [facility], most recently dispensed 10/13/17 for cycle 2. Will plan to submit script to [facility] for next fill when due.     ----- Message from Anika Santos sent at 10/16/2017 12:09 PM EDT -----  Regarding: RAY- CLARIFICATION   Contact: [phone]  [facility] CALLED NEEDING CLARIFICATION ON THE MEDICATION REVLIMID.      OPTION 3 FOR PHARMACY   REFERENCE # [id number]    PLEASE CALL

## 2017-10-17 ENCOUNTER — INFUSION (OUTPATIENT)
Dept: ONCOLOGY | Facility: HOSPITAL | Age: 77
End: 2017-10-17

## 2017-10-17 VITALS
BODY MASS INDEX: 24.13 KG/M2 | DIASTOLIC BLOOD PRESSURE: 52 MMHG | HEIGHT: 74 IN | TEMPERATURE: 98 F | HEART RATE: 53 BPM | SYSTOLIC BLOOD PRESSURE: 117 MMHG | RESPIRATION RATE: 20 BRPM | WEIGHT: 188 LBS

## 2017-10-17 DIAGNOSIS — T45.1X5A ANEMIA DUE TO ANTINEOPLASTIC CHEMOTHERAPY: ICD-10-CM

## 2017-10-17 DIAGNOSIS — C71.9 MALIGNANT NEOPLASM OF BRAIN, UNSPECIFIED LOCATION (HCC): ICD-10-CM

## 2017-10-17 DIAGNOSIS — C90.00 MULTIPLE MYELOMA, REMISSION STATUS UNSPECIFIED (HCC): ICD-10-CM

## 2017-10-17 DIAGNOSIS — D64.81 ANEMIA DUE TO ANTINEOPLASTIC CHEMOTHERAPY: ICD-10-CM

## 2017-10-17 LAB
ABO GROUP BLD: NORMAL
BLD GP AB SCN SERPL QL: NEGATIVE
RH BLD: POSITIVE

## 2017-10-17 PROCEDURE — 86901 BLOOD TYPING SEROLOGIC RH(D): CPT

## 2017-10-17 PROCEDURE — 36430 TRANSFUSION BLD/BLD COMPNT: CPT

## 2017-10-17 PROCEDURE — 86900 BLOOD TYPING SEROLOGIC ABO: CPT

## 2017-10-17 PROCEDURE — P9016 RBC LEUKOCYTES REDUCED: HCPCS

## 2017-10-17 PROCEDURE — 63710000001 DIPHENHYDRAMINE PER 50 MG: Performed by: INTERNAL MEDICINE

## 2017-10-17 PROCEDURE — 86850 RBC ANTIBODY SCREEN: CPT

## 2017-10-17 PROCEDURE — 36415 COLL VENOUS BLD VENIPUNCTURE: CPT

## 2017-10-17 PROCEDURE — 86923 COMPATIBILITY TEST ELECTRIC: CPT

## 2017-10-17 RX ORDER — DIPHENHYDRAMINE HCL 25 MG
25 CAPSULE ORAL ONCE
Status: COMPLETED | OUTPATIENT
Start: 2017-10-17 | End: 2017-10-17

## 2017-10-17 RX ORDER — ACETAMINOPHEN 325 MG/1
325 TABLET ORAL ONCE
Status: COMPLETED | OUTPATIENT
Start: 2017-10-17 | End: 2017-10-17

## 2017-10-17 RX ORDER — SODIUM CHLORIDE 9 MG/ML
250 INJECTION, SOLUTION INTRAVENOUS AS NEEDED
Status: DISCONTINUED | OUTPATIENT
Start: 2017-10-17 | End: 2017-10-17 | Stop reason: HOSPADM

## 2017-10-17 RX ADMIN — DIPHENHYDRAMINE HYDROCHLORIDE 25 MG: 25 CAPSULE ORAL at 10:07

## 2017-10-17 RX ADMIN — ACETAMINOPHEN 325 MG: 325 TABLET, FILM COATED ORAL at 10:07

## 2017-10-18 LAB
ABO + RH BLD: NORMAL
ABO + RH BLD: NORMAL
BH BB BLOOD EXPIRATION DATE: NORMAL
BH BB BLOOD EXPIRATION DATE: NORMAL
BH BB BLOOD TYPE BARCODE: 5100
BH BB BLOOD TYPE BARCODE: 5100
BH BB DISPENSE STATUS: NORMAL
BH BB DISPENSE STATUS: NORMAL
BH BB PRODUCT CODE: NORMAL
BH BB PRODUCT CODE: NORMAL
BH BB UNIT NUMBER: NORMAL
BH BB UNIT NUMBER: NORMAL
UNIT  ABO: NORMAL
UNIT  ABO: NORMAL
UNIT  RH: NORMAL
UNIT  RH: NORMAL

## 2017-10-18 RX ORDER — METOPROLOL SUCCINATE 25 MG/1
TABLET, EXTENDED RELEASE ORAL
Qty: 90 TABLET | Refills: 1 | Status: SHIPPED | OUTPATIENT
Start: 2017-10-18 | End: 2018-04-06 | Stop reason: SDUPTHER

## 2017-10-19 ENCOUNTER — INFUSION (OUTPATIENT)
Dept: ONCOLOGY | Facility: HOSPITAL | Age: 77
End: 2017-10-19

## 2017-10-19 VITALS
TEMPERATURE: 97.7 F | DIASTOLIC BLOOD PRESSURE: 66 MMHG | RESPIRATION RATE: 16 BRPM | BODY MASS INDEX: 23.87 KG/M2 | SYSTOLIC BLOOD PRESSURE: 142 MMHG | WEIGHT: 186 LBS | HEIGHT: 74 IN | HEART RATE: 67 BPM

## 2017-10-19 DIAGNOSIS — C90.00 MULTIPLE MYELOMA NOT HAVING ACHIEVED REMISSION (HCC): Primary | ICD-10-CM

## 2017-10-19 LAB
ERYTHROCYTE [DISTWIDTH] IN BLOOD BY AUTOMATED COUNT: 23.9 % (ref 11.3–14.5)
HCT VFR BLD AUTO: 31 % (ref 38.9–50.9)
HGB BLD-MCNC: 10.1 G/DL (ref 13.1–17.5)
LYMPHOCYTES # BLD AUTO: 0.6 10*3/MM3 (ref 0.6–4.8)
LYMPHOCYTES NFR BLD AUTO: 20.4 % (ref 24–44)
MCH RBC QN AUTO: 32.4 PG (ref 27–31)
MCHC RBC AUTO-ENTMCNC: 32.6 G/DL (ref 32–36)
MCV RBC AUTO: 99.6 FL (ref 80–99)
MONOCYTES # BLD AUTO: 0.1 10*3/MM3 (ref 0–1)
MONOCYTES NFR BLD AUTO: 4.9 % (ref 0–12)
NEUTROPHILS # BLD AUTO: 2.2 10*3/MM3 (ref 1.5–8.3)
NEUTROPHILS NFR BLD AUTO: 74.7 % (ref 41–71)
PLATELET # BLD AUTO: 74 10*3/MM3 (ref 150–450)
PMV BLD AUTO: 8.7 FL (ref 6–12)
RBC # BLD AUTO: 3.12 10*6/MM3 (ref 4.2–5.76)
WBC NRBC COR # BLD: 3 10*3/MM3 (ref 3.5–10.8)

## 2017-10-19 PROCEDURE — 36415 COLL VENOUS BLD VENIPUNCTURE: CPT

## 2017-10-19 PROCEDURE — 25010000002 BORTEZOMIB PER 0.1 MG: Performed by: INTERNAL MEDICINE

## 2017-10-19 PROCEDURE — 96401 CHEMO ANTI-NEOPL SQ/IM: CPT

## 2017-10-19 PROCEDURE — 85025 COMPLETE CBC W/AUTO DIFF WBC: CPT

## 2017-10-19 RX ORDER — BORTEZOMIB 3.5 MG/1
1.3 INJECTION, POWDER, LYOPHILIZED, FOR SOLUTION INTRAVENOUS; SUBCUTANEOUS ONCE
Status: COMPLETED | OUTPATIENT
Start: 2017-10-19 | End: 2017-10-19

## 2017-10-19 RX ADMIN — BORTEZOMIB 2.7 MG: 3.5 INJECTION, POWDER, LYOPHILIZED, FOR SOLUTION INTRAVENOUS; SUBCUTANEOUS at 14:23

## 2017-10-24 ENCOUNTER — SPECIALTY PHARMACY (OUTPATIENT)
Dept: ONCOLOGY | Facility: HOSPITAL | Age: 77
End: 2017-10-24

## 2017-10-24 DIAGNOSIS — C90.00 MULTIPLE MYELOMA NOT HAVING ACHIEVED REMISSION (HCC): Primary | ICD-10-CM

## 2017-10-24 RX ORDER — LENALIDOMIDE 15 MG/1
15 CAPSULE ORAL DAILY
Qty: 14 CAPSULE | Refills: 0 | Status: SHIPPED | OUTPATIENT
Start: 2017-10-24 | End: 2018-01-18

## 2017-10-24 NOTE — PROGRESS NOTES
Oral Chemotherapy Teaching      Patient Name/:  Marcin Joyce   1940  Oral Chemotherapy Regimen:  Lenalidomide 15mg PO daily on days 1-14, followed by 7 days off + Dex 40mg weekly + Velcade weekly  Date Started Medication :Cycle 2 initiated on 10/12/17 (Day 13)    Cycle 1 17  Cycle 2: 10/12/17    Pt reports starting dexamethasone 40mg weekly on 17 as instructed.     Initial Teaching Follow Up Comments     Safety     Storage instructions (away from children; away from heat/cold, sunlight, or moisture), handling - use of gloves (caregivers), washing hands after touching pills, managing waste     “How are you storing your medications?”, reminders on storage, proper handling (caregivers using gloves, washing hands, away from children, managing waste, etc.), disposal of medication with D/C or dosage change    Pt reports appropriate handling and storage of oral chemotherapy. Advised on storing at room temp away from pets or children. Pt to wash hands after handling. Do not share medication. Advised of teratogenic nature of medication. Pt and daughters present and verbalized understanding.      Adherence      patient and/or caregiver on how to take medication, take with/without food, assess their adherence potential, stress importance of adherence, ways to manage adherence (pill boxes, phone reminders, calendars), what to do if miss a dose   “How are you taking your medication?” “How are you remembering to take your medication?”, “How many doses have you missed?”, determine reasons for non-adherence (not remembering, side effects, etc), ways to improve, overadherence? Remind patient of ways to improve/maintain adherence   Reports adherence to  Lenalidomide administered daily at same time for 14 days, followed by 7 days off. Reports having 1 more day of lenalidomide remaining, then will take 7 days off. Utilizing calendar. Will update and mail out new calendar. Will submit refill for next cycle of  "lenalidomide to begin on 11/2/17. Discussed supportive care medications which include: Allopurinol, bactrim, Aspirin, Acyclovir, and Zofran. Pt confirms complaince to supportive care medications.       Side Effects/Adverse Reactions      patient on potential side effects, s/s, ways to manage, when to call MD/seek help     Determine if patient experiencing side effects, ways to manage  Pt reports tolerating therapy at this time, reports some side effects noted, but \"nothing that I can't deal with\". Denies rash, does report constipation as main concern. Reports using milk of mag with no relief, picking miralax today to trail. Pt coming to infusion on Thursday, if no success with miralax may consider docusate/senna utilization.      Miscellaneous     Food interactions, DDIs, financial issues Determine if patient started any new medications since being placed on oral chemo (analyze for DDI) Will submit script to [facility] for next cycle of lenalidomide scheduled to begin on 11/2/17.       Additional Notes: Reviewed aforementioned material with patient. Reports currently tolerating medication. Submitted refill for next Cycle to [facility].     "

## 2017-10-26 ENCOUNTER — OFFICE VISIT (OUTPATIENT)
Dept: ONCOLOGY | Facility: CLINIC | Age: 77
End: 2017-10-26

## 2017-10-26 ENCOUNTER — INFUSION (OUTPATIENT)
Dept: ONCOLOGY | Facility: HOSPITAL | Age: 77
End: 2017-10-26

## 2017-10-26 VITALS
SYSTOLIC BLOOD PRESSURE: 134 MMHG | RESPIRATION RATE: 18 BRPM | HEIGHT: 74 IN | WEIGHT: 181 LBS | HEART RATE: 59 BPM | DIASTOLIC BLOOD PRESSURE: 62 MMHG | TEMPERATURE: 97.3 F | BODY MASS INDEX: 23.23 KG/M2

## 2017-10-26 DIAGNOSIS — C90.00 MULTIPLE MYELOMA NOT HAVING ACHIEVED REMISSION (HCC): Primary | ICD-10-CM

## 2017-10-26 DIAGNOSIS — C90.00 MULTIPLE MYELOMA, REMISSION STATUS UNSPECIFIED (HCC): Primary | ICD-10-CM

## 2017-10-26 LAB
CREAT BLDA-MCNC: 0.8 MG/DL (ref 0.6–1.3)
CREATINE SERPL-MCNC: 0.8 MG/DL
ERYTHROCYTE [DISTWIDTH] IN BLOOD BY AUTOMATED COUNT: 23.2 % (ref 11.3–14.5)
HCT VFR BLD AUTO: 29.8 % (ref 38.9–50.9)
HGB BLD-MCNC: 9.8 G/DL (ref 13.1–17.5)
LYMPHOCYTES # BLD AUTO: 0.5 10*3/MM3 (ref 0.6–4.8)
LYMPHOCYTES NFR BLD AUTO: 19.5 % (ref 24–44)
MCH RBC QN AUTO: 32.9 PG (ref 27–31)
MCHC RBC AUTO-ENTMCNC: 32.9 G/DL (ref 32–36)
MCV RBC AUTO: 100 FL (ref 80–99)
MONOCYTES # BLD AUTO: 0.1 10*3/MM3 (ref 0–1)
MONOCYTES NFR BLD AUTO: 4.5 % (ref 0–12)
NEUTROPHILS # BLD AUTO: 2.1 10*3/MM3 (ref 1.5–8.3)
NEUTROPHILS NFR BLD AUTO: 76 % (ref 41–71)
PLATELET # BLD AUTO: 44 10*3/MM3 (ref 150–450)
PMV BLD AUTO: 8.8 FL (ref 6–12)
RBC # BLD AUTO: 2.98 10*6/MM3 (ref 4.2–5.76)
WBC NRBC COR # BLD: 2.7 10*3/MM3 (ref 3.5–10.8)

## 2017-10-26 PROCEDURE — 96374 THER/PROPH/DIAG INJ IV PUSH: CPT

## 2017-10-26 PROCEDURE — 99214 OFFICE O/P EST MOD 30 MIN: CPT | Performed by: INTERNAL MEDICINE

## 2017-10-26 PROCEDURE — 25010000002 ZOLEDRONIC ACID PER 1 MG: Performed by: INTERNAL MEDICINE

## 2017-10-26 PROCEDURE — 85025 COMPLETE CBC W/AUTO DIFF WBC: CPT

## 2017-10-26 PROCEDURE — 25010000002 BORTEZOMIB PER 0.1 MG: Performed by: INTERNAL MEDICINE

## 2017-10-26 PROCEDURE — 36415 COLL VENOUS BLD VENIPUNCTURE: CPT

## 2017-10-26 PROCEDURE — 82565 ASSAY OF CREATININE: CPT

## 2017-10-26 PROCEDURE — 96365 THER/PROPH/DIAG IV INF INIT: CPT

## 2017-10-26 PROCEDURE — 96401 CHEMO ANTI-NEOPL SQ/IM: CPT

## 2017-10-26 RX ORDER — BORTEZOMIB 3.5 MG/1
1.3 INJECTION, POWDER, LYOPHILIZED, FOR SOLUTION INTRAVENOUS; SUBCUTANEOUS ONCE
Status: COMPLETED | OUTPATIENT
Start: 2017-10-26 | End: 2017-10-26

## 2017-10-26 RX ADMIN — BORTEZOMIB 2.7 MG: 3.5 INJECTION, POWDER, LYOPHILIZED, FOR SOLUTION INTRAVENOUS; SUBCUTANEOUS at 13:07

## 2017-10-26 RX ADMIN — ZOLEDRONIC ACID 4 MG: 4 INJECTION, SOLUTION, CONCENTRATE INTRAVENOUS at 12:28

## 2017-10-26 NOTE — PROGRESS NOTES
Chief Complaint   Follow-up recently diagnosed myeloma-weekly dexamethasone, weekly Velcade, daily Revlimid    PROBLEM LIST   Patient Active Problem List   Diagnosis   • CAD (coronary artery disease)   • Dyslipidemia   • Right hip pain   • Arthritis   • Cellulitis of left lower extremity   • Left carotid bruit   • Plasmacytoma   • Brain cancer   • Neck pain   • Anemia   • Multiple myeloma       HISTORY OF PRESENT ILLNESS:   Neck pain not drawn but much much better.  No fevers or chilling.  Energy level a bit improved after transfusion.  Breathing comfortably.  No new neurologic or bony symptoms.  Does have significant constipation.  In a quandary about what to do    Past Medical History, Past Surgical History, Social History, Family History have been reviewed and are without significant changes except as mentioned.      Medications:      Current Outpatient Prescriptions:   •  acyclovir (ZOVIRAX) 400 MG tablet, Take 1 tablet by mouth 2 (Two) Times a Day., Disp: 60 tablet, Rfl: 7  •  allopurinol (ZYLOPRIM) 300 MG tablet, Take 1 tablet by mouth Daily., Disp: 30 tablet, Rfl: 11  •  aspirin 81 MG tablet, Take  by mouth daily., Disp: , Rfl:   •  atorvastatin (LIPITOR) 20 MG tablet, Take  by mouth. 1 QHS, Disp: , Rfl:   •  dexamethasone (DECADRON) 4 MG tablet, Take 10 tablets by mouth Daily With Breakfast. Take with food on Days 1, 8, 15., Disp: 30 tablet, Rfl: 3  •  ezetimibe (ZETIA) 10 MG tablet, Take 10 mg by mouth Daily., Disp: , Rfl:   •  glimepiride (AMARYL) 2 MG tablet, Take  by mouth daily., Disp: , Rfl:   •  latanoprost (XALATAN) 0.005 % ophthalmic solution, Apply  to eye. qhs, Disp: , Rfl:   •  lenalidomide (REVLIMID) 15 MG capsule, Take 1 capsule by mouth Daily. on days 1-14, followed by 7 days off. REMS [id number], Adult Male, Disp: 14 capsule, Rfl: 0  •  lenalidomide (REVLIMID) 15 MG capsule, Take 1 capsule by mouth Daily. on days 1-14, followed by 7 days off. REMS [id number], Adult Male, Disp: 14 capsule, Rfl: 0  •  " lenalidomide (REVLIMID) 15 MG capsule, Take 1 capsule by mouth Daily. on days 1-14, followed by 7 days off. REMS [id number], Adult Male, Disp: 14 capsule, Rfl: 0  •  metoprolol succinate XL (TOPROL-XL) 25 MG 24 hr tablet, TAKE 1 TABLET BY MOUTH  DAILY, Disp: 90 tablet, Rfl: 1  •  nitroglycerin (NITROSTAT) 0.4 MG SL tablet, Place 1 tablet under the tongue every 5 (five) minutes as needed for chest pain., Disp: 25 tablet, Rfl: 6  •  ondansetron (ZOFRAN) 8 MG tablet, Take 1 tablet by mouth 3 (Three) Times a Day As Needed for Nausea or Vomiting., Disp: 30 tablet, Rfl: 5  •  ramipril (ALTACE) 5 MG capsule, Take  by mouth. 1 QD, Disp: , Rfl:   •  sulfamethoxazole-trimethoprim (BACTRIM DS) 800-160 MG per tablet, 1 tab PO 3x/week on M-W-F, Disp: 12 tablet, Rfl: 5    ALLERGIES:    Allergies   Allergen Reactions   • Penicillins    • Simvastatin    • Voltaren [Diclofenac Sodium]        ROS:  Review of Systems   Constitutional: Negative for activity change and appetite change.        Fatigue present but better   HENT: Negative for mouth sores, sinus pressure and voice change.    Eyes: Negative for visual disturbance.   Respiratory: Negative for shortness of breath.    Cardiovascular: Negative for chest pain.   Gastrointestinal: Negative for abdominal pain and vomiting.        Constipation present   Genitourinary: Negative for dysuria.   Musculoskeletal: Negative for arthralgias and myalgias.   Skin: Negative for color change.   Neurological: Negative for dizziness, syncope and headaches.   Hematological: Negative for adenopathy.   Psychiatric/Behavioral: Negative for confusion, sleep disturbance and suicidal ideas. The patient is not nervous/anxious.            Objective:    /62 Comment: RUE  Pulse 59  Temp 97.3 °F (36.3 °C) (Temporal Artery )   Resp 18  Ht 74\" (188 cm)  Wt 181 lb (82.1 kg)  BMI 23.24 kg/m2    Physical Exam   Constitutional: He is oriented to person, place, and time. He appears well-developed and " well-nourished. No distress.   Alert oriented and in no acute distress.  Smiles readily.  Moderately pale as usual.   HENT:   Head: Normocephalic.   Mouth/Throat: Oropharynx is clear and moist.   Eyes: Conjunctivae and EOM are normal. No scleral icterus.   Neck: Normal range of motion. Neck supple. No thyromegaly present.   Cardiovascular: Normal rate, regular rhythm and normal heart sounds.    No murmur heard.  Pulmonary/Chest: Effort normal and breath sounds normal. He has no wheezes. He has no rales.   Abdominal: Soft. Bowel sounds are normal. He exhibits no distension and no mass. There is no tenderness.   Musculoskeletal: He exhibits no edema or tenderness.   Lymphadenopathy:     He has no cervical adenopathy.   Neurological: He is alert and oriented to person, place, and time. He displays normal reflexes. No cranial nerve deficit.   Skin: Skin is warm and dry. No rash noted.   Psychiatric: He has a normal mood and affect. Judgment normal.   Vitals reviewed.             RECENT LABS:  Hematology WBC   Date Value Ref Range Status   10/19/2017 3.00 (L) 3.50 - 10.80 10*3/mm3 Final     Hemoglobin   Date Value Ref Range Status   10/19/2017 10.1 (L) 13.1 - 17.5 g/dL Final     Hematocrit   Date Value Ref Range Status   10/19/2017 31.0 (L) 38.9 - 50.9 % Final     MCV   Date Value Ref Range Status   10/19/2017 99.6 (H) 80.0 - 99.0 fL Final     RDW   Date Value Ref Range Status   10/19/2017 23.9 (H) 11.3 - 14.5 % Final     MPV   Date Value Ref Range Status   10/19/2017 8.7 6.0 - 12.0 fL Final     Platelets   Date Value Ref Range Status   10/19/2017 74 (L) 150 - 450 10*3/mm3 Final     Comment:     Verified by repeat analysis.      Immature Grans %   Date Value Ref Range Status   09/12/2017 0.3 0.0 - 0.6 % Final     Neutrophils, Absolute   Date Value Ref Range Status   10/19/2017 2.20 1.50 - 8.30 10*3/mm3 Final     Lymphocytes, Absolute   Date Value Ref Range Status   10/19/2017 0.60 0.60 - 4.80 10*3/mm3 Final      Monocytes, Absolute   Date Value Ref Range Status   10/19/2017 0.10 0.00 - 1.00 10*3/mm3 Final     Eosinophils, Absolute   Date Value Ref Range Status   09/12/2017 0.03 0.00 - 0.30 10*3/mm3 Final     Basophils, Absolute   Date Value Ref Range Status   09/12/2017 0.00 0.00 - 0.20 10*3/mm3 Final     Immature Grans, Absolute   Date Value Ref Range Status   09/12/2017 0.01 0.00 - 0.03 10*3/mm3 Final     nRBC   Date Value Ref Range Status   03/19/2015 0.0  Final       Glucose   Date Value Ref Range Status   10/12/2017 170 (H) 70 - 100 mg/dL Final     Sodium   Date Value Ref Range Status   10/12/2017 136 132 - 146 mmol/L Final     Potassium   Date Value Ref Range Status   10/12/2017 4.3 3.5 - 5.5 mmol/L Final     CO2   Date Value Ref Range Status   10/12/2017 24.0 20.0 - 31.0 mmol/L Final     Chloride   Date Value Ref Range Status   10/12/2017 105 99 - 109 mmol/L Final     Anion Gap   Date Value Ref Range Status   10/12/2017 7.0 3.0 - 11.0 mmol/L Final     Creatinine   Date Value Ref Range Status   10/12/2017 0.80 0.60 - 1.30 mg/dL Final     BUN   Date Value Ref Range Status   10/12/2017 25 (H) 9 - 23 mg/dL Final     BUN/Creatinine Ratio   Date Value Ref Range Status   10/12/2017 31.3 (H) 7.0 - 25.0 Final     Calcium   Date Value Ref Range Status   10/12/2017 8.6 (L) 8.7 - 10.4 mg/dL Final     eGFR Non  Amer   Date Value Ref Range Status   10/12/2017 93 >60 mL/min/1.73 Final     Alkaline Phosphatase   Date Value Ref Range Status   10/12/2017 69 25 - 100 U/L Final     Total Protein   Date Value Ref Range Status   10/12/2017 6.4 5.7 - 8.2 g/dL Final     ALT (SGPT)   Date Value Ref Range Status   10/12/2017 18 7 - 40 U/L Final     AST (SGOT)   Date Value Ref Range Status   10/12/2017 14 0 - 33 U/L Final     Total Bilirubin   Date Value Ref Range Status   10/12/2017 0.4 0.3 - 1.2 mg/dL Final     Albumin   Date Value Ref Range Status   10/12/2017 3.70 3.20 - 4.80 g/dL Final     Globulin   Date Value Ref Range Status    10/12/2017 2.7 gm/dL Final     A/G Ratio   Date Value Ref Range Status   10/12/2017 1.4 (L) 1.5 - 2.5 g/dL Final          Perf Status:1    Assessment/Plan    Diagnoses and all orders for this visit:    Multiple myeloma, remission status unspecified    Patient is tolerating his chemotherapy reasonably well.  We'll continue same.    Constipation.  This is due to his medicines.  I went over a bowel regimen with him including Senokot and MiraLAX.  He'll take a bottle of magnesium citrate today since he is already pretty constipated.    Symptomatic anemia secondary to myeloma-I will continue to transfuse as needed.      Naresh Turner MD , 10/26/2017    CC:

## 2017-10-26 NOTE — PROGRESS NOTES
Left message for patient to call back.  Please inform him of message and have him schedule an appointment for fasting lab 1 week before his 4/24/18 appointment with Dr. Isidro.  Orders entered into EPIC.   Oncology Nutrition Screening    Patient Name:  Marcin Joyce  YOB: 1940  MRN: [id number]  Date:  09/21/17  Physician:  Dr. Turner    Type of Cancer Treatment:   Chemotherapy:  Revlimid-days 1-14 / Velcade-weekly / Dexamethasone-weekly - every 21 days    Patient Active Problem List   Diagnosis   • CAD (coronary artery disease)   • Dyslipidemia   • Right hip pain   • Arthritis   • Cellulitis of left lower extremity   • Left carotid bruit   • Plasmacytoma   • Brain cancer   • Neck pain   • Anemia   • Multiple myeloma       Current Outpatient Prescriptions   Medication Sig Dispense Refill   • acyclovir (ZOVIRAX) 400 MG tablet Take 1 tablet by mouth 2 (Two) Times a Day. 60 tablet 7   • allopurinol (ZYLOPRIM) 300 MG tablet Take 1 tablet by mouth Daily. 30 tablet 11   • aspirin 81 MG tablet Take  by mouth daily.     • atorvastatin (LIPITOR) 20 MG tablet Take  by mouth. 1 QHS     • dexamethasone (DECADRON) 4 MG tablet Take 10 tablets by mouth Daily With Breakfast. Take with food on Days 1, 8, 15. 30 tablet 3   • ezetimibe (ZETIA) 10 MG tablet Take 10 mg by mouth Daily.     • glimepiride (AMARYL) 2 MG tablet Take  by mouth daily.     • latanoprost (XALATAN) 0.005 % ophthalmic solution Apply  to eye. qhs     • lenalidomide (REVLIMID) 15 MG capsule Take 1 capsule by mouth Daily. on days 1-14, followed by 7 days off. REMS [id number], Adult Male 14 capsule 0   • metoprolol succinate XL (TOPROL-XL) 25 MG 24 hr tablet Take 1 tablet by mouth daily. 1 QD 90 tablet 3   • nitroglycerin (NITROSTAT) 0.4 MG SL tablet Place 1 tablet under the tongue every 5 (five) minutes as needed for chest pain. 25 tablet 6   • ondansetron (ZOFRAN) 8 MG tablet Take 1 tablet by mouth 3 (Three) Times a Day As Needed for Nausea or Vomiting. 30 tablet 5   • ramipril (ALTACE) 5 MG capsule Take  by mouth. 1 QD     • sulfamethoxazole-trimethoprim (BACTRIM DS) 800-160 MG per tablet 1 tab PO 3x/week on M-W-F 12 tablet 5     No current  facility-administered medications for this visit.        Glycemic Risk:   Steroids    Weight:   Height: 73 inches  Weight: 185 lbs.  Usual Body Weight: ~183 lbs.   BMI: 24.4  Normal  Weight has decreased ~5 pounds over last ~1 week-will monitor    Oral Food Intake:  Regular Diet - No Restrictions    Hydration Status:   How many 8 ounce glass of water of fluid do you drink per day?  5    Enteral Feeding:   n/a    Nutrition Symptoms:   No Problems with Eating    Activity:   Did not assess at this time.     reports that he has never smoked. He has never used smokeless tobacco. He reports that he does not drink alcohol or use illicit drugs.    Evaluation of Nutritional Risk:   Patient  has been identified at low nutritional risk due to diagnosis, treatment plan, and recent weight change.  Met with patient during his initial Velcade appointment.  He states his appetite has been normal and denies nutritional complaints at this time.  He does report the 5# weight loss x 1 week-will monitor.    Discussed the importance of good nutrition during his treatment course focusing adequate calorie, protein and nutrient intake.  Emphasized the importance of protein and its role in the diet; reviewed high protein foods; and recommended he have a protein source at each meal/snack.  Also emphasized the importance of hydration; reviewed hydrating fluid options; and recommended he increase his water intake with a goal of 64 ounces daily.  Reviewed the ACS nutrition booklet and suggested he use it for symptom management as needed.    He denies nutritional questions at this time.  RD's contact information provided and encouraged him to call if questions arise.  He voiced understanding of information discussed.  Will monitor as needed for nutrition intervention/education during his treatment course.    Electronically signed by:  Carli Dozier RD  12:23 PM

## 2017-11-02 ENCOUNTER — INFUSION (OUTPATIENT)
Dept: ONCOLOGY | Facility: HOSPITAL | Age: 77
End: 2017-11-02

## 2017-11-02 VITALS
SYSTOLIC BLOOD PRESSURE: 162 MMHG | BODY MASS INDEX: 23.49 KG/M2 | HEART RATE: 59 BPM | RESPIRATION RATE: 16 BRPM | TEMPERATURE: 97.8 F | DIASTOLIC BLOOD PRESSURE: 65 MMHG | HEIGHT: 74 IN | WEIGHT: 183 LBS

## 2017-11-02 DIAGNOSIS — C90.00 MULTIPLE MYELOMA NOT HAVING ACHIEVED REMISSION (HCC): Primary | ICD-10-CM

## 2017-11-02 LAB
ALBUMIN SERPL-MCNC: 4 G/DL (ref 3.2–4.8)
ALBUMIN/GLOB SERPL: 1.5 G/DL (ref 1.5–2.5)
ALP SERPL-CCNC: 72 U/L (ref 25–100)
ALT SERPL W P-5'-P-CCNC: 27 U/L (ref 7–40)
ANION GAP SERPL CALCULATED.3IONS-SCNC: 5 MMOL/L (ref 3–11)
AST SERPL-CCNC: 19 U/L (ref 0–33)
BILIRUB SERPL-MCNC: 0.5 MG/DL (ref 0.3–1.2)
BUN BLD-MCNC: 20 MG/DL (ref 9–23)
BUN/CREAT SERPL: 22.2 (ref 7–25)
CALCIUM SPEC-SCNC: 9.5 MG/DL (ref 8.7–10.4)
CHLORIDE SERPL-SCNC: 105 MMOL/L (ref 99–109)
CO2 SERPL-SCNC: 27 MMOL/L (ref 20–31)
CREAT BLD-MCNC: 0.9 MG/DL (ref 0.6–1.3)
CREAT BLDA-MCNC: 0.9 MG/DL (ref 0.6–1.3)
ERYTHROCYTE [DISTWIDTH] IN BLOOD BY AUTOMATED COUNT: 23.8 % (ref 11.3–14.5)
GFR SERPL CREATININE-BSD FRML MDRD: 81 ML/MIN/1.73
GLOBULIN UR ELPH-MCNC: 2.6 GM/DL
GLUCOSE BLD-MCNC: 165 MG/DL (ref 70–100)
HCT VFR BLD AUTO: 29.1 % (ref 38.9–50.9)
HGB BLD-MCNC: 9.4 G/DL (ref 13.1–17.5)
LYMPHOCYTES # BLD AUTO: 0.8 10*3/MM3 (ref 0.6–4.8)
LYMPHOCYTES NFR BLD AUTO: 38.8 % (ref 24–44)
MCH RBC QN AUTO: 32.8 PG (ref 27–31)
MCHC RBC AUTO-ENTMCNC: 32.5 G/DL (ref 32–36)
MCV RBC AUTO: 100.9 FL (ref 80–99)
MONOCYTES # BLD AUTO: 0.1 10*3/MM3 (ref 0–1)
MONOCYTES NFR BLD AUTO: 3.9 % (ref 0–12)
NEUTROPHILS # BLD AUTO: 1.1 10*3/MM3 (ref 1.5–8.3)
NEUTROPHILS NFR BLD AUTO: 57.3 % (ref 41–71)
PLATELET # BLD AUTO: 32 10*3/MM3 (ref 150–450)
PMV BLD AUTO: 9.1 FL (ref 6–12)
POTASSIUM BLD-SCNC: 4.5 MMOL/L (ref 3.5–5.5)
PROT SERPL-MCNC: 6.6 G/DL (ref 5.7–8.2)
RBC # BLD AUTO: 2.88 10*6/MM3 (ref 4.2–5.76)
SODIUM BLD-SCNC: 137 MMOL/L (ref 132–146)
WBC NRBC COR # BLD: 2 10*3/MM3 (ref 3.5–10.8)

## 2017-11-02 PROCEDURE — 82565 ASSAY OF CREATININE: CPT

## 2017-11-02 PROCEDURE — 96401 CHEMO ANTI-NEOPL SQ/IM: CPT

## 2017-11-02 PROCEDURE — 36415 COLL VENOUS BLD VENIPUNCTURE: CPT

## 2017-11-02 PROCEDURE — 25010000002 BORTEZOMIB PER 0.1 MG: Performed by: INTERNAL MEDICINE

## 2017-11-02 PROCEDURE — 85025 COMPLETE CBC W/AUTO DIFF WBC: CPT

## 2017-11-02 PROCEDURE — 80053 COMPREHEN METABOLIC PANEL: CPT

## 2017-11-02 RX ORDER — BORTEZOMIB 3.5 MG/1
1.3 INJECTION, POWDER, LYOPHILIZED, FOR SOLUTION INTRAVENOUS; SUBCUTANEOUS ONCE
Status: COMPLETED | OUTPATIENT
Start: 2017-11-02 | End: 2017-11-02

## 2017-11-02 RX ADMIN — BORTEZOMIB 2.7 MG: 3.5 INJECTION, POWDER, LYOPHILIZED, FOR SOLUTION INTRAVENOUS; SUBCUTANEOUS at 13:14

## 2017-11-06 ENCOUNTER — TELEPHONE (OUTPATIENT)
Dept: ONCOLOGY | Facility: CLINIC | Age: 77
End: 2017-11-06

## 2017-11-06 DIAGNOSIS — C90.00 MULTIPLE MYELOMA, REMISSION STATUS UNSPECIFIED (HCC): Primary | ICD-10-CM

## 2017-11-06 NOTE — TELEPHONE ENCOUNTER
Per Dr. Turner, patient instructed to hold velcade this week but get labs on Thursday. Patient to keep appt with Dr. Turner  With Velcade to follow on 11/16/17.  Patient stated understanding.  11/9/17 Inf appt cancelled with Gabriel DUNN

## 2017-11-09 ENCOUNTER — APPOINTMENT (OUTPATIENT)
Dept: ONCOLOGY | Facility: HOSPITAL | Age: 77
End: 2017-11-09

## 2017-11-09 ENCOUNTER — LAB (OUTPATIENT)
Dept: LAB | Facility: HOSPITAL | Age: 77
End: 2017-11-09

## 2017-11-09 DIAGNOSIS — C90.00 MULTIPLE MYELOMA, REMISSION STATUS UNSPECIFIED (HCC): ICD-10-CM

## 2017-11-09 LAB
ALBUMIN SERPL-MCNC: 3.5 G/DL (ref 3.2–4.8)
ALBUMIN/GLOB SERPL: 1.8 G/DL (ref 1.5–2.5)
ALP SERPL-CCNC: 66 U/L (ref 25–100)
ALT SERPL W P-5'-P-CCNC: 20 U/L (ref 7–40)
ANION GAP SERPL CALCULATED.3IONS-SCNC: 5 MMOL/L (ref 3–11)
AST SERPL-CCNC: 14 U/L (ref 0–33)
BILIRUB SERPL-MCNC: 0.6 MG/DL (ref 0.3–1.2)
BUN BLD-MCNC: 18 MG/DL (ref 9–23)
BUN/CREAT SERPL: 22.5 (ref 7–25)
CALCIUM SPEC-SCNC: 8.5 MG/DL (ref 8.7–10.4)
CHLORIDE SERPL-SCNC: 104 MMOL/L (ref 99–109)
CO2 SERPL-SCNC: 27 MMOL/L (ref 20–31)
CREAT BLD-MCNC: 0.8 MG/DL (ref 0.6–1.3)
ERYTHROCYTE [DISTWIDTH] IN BLOOD BY AUTOMATED COUNT: 24.3 % (ref 11.3–14.5)
GFR SERPL CREATININE-BSD FRML MDRD: 93 ML/MIN/1.73
GLOBULIN UR ELPH-MCNC: 2 GM/DL
GLUCOSE BLD-MCNC: 166 MG/DL (ref 70–100)
HCT VFR BLD AUTO: 24.9 % (ref 38.9–50.9)
HGB BLD-MCNC: 8 G/DL (ref 13.1–17.5)
LYMPHOCYTES # BLD AUTO: 0.8 10*3/MM3 (ref 0.6–4.8)
LYMPHOCYTES NFR BLD AUTO: 35.9 % (ref 24–44)
MCH RBC QN AUTO: 32.5 PG (ref 27–31)
MCHC RBC AUTO-ENTMCNC: 32.3 G/DL (ref 32–36)
MCV RBC AUTO: 100.6 FL (ref 80–99)
MONOCYTES # BLD AUTO: 0.1 10*3/MM3 (ref 0–1)
MONOCYTES NFR BLD AUTO: 6.1 % (ref 0–12)
NEUTROPHILS # BLD AUTO: 1.3 10*3/MM3 (ref 1.5–8.3)
NEUTROPHILS NFR BLD AUTO: 58 % (ref 41–71)
PLATELET # BLD AUTO: 49 10*3/MM3 (ref 150–450)
PMV BLD AUTO: 9.6 FL (ref 6–12)
POTASSIUM BLD-SCNC: 4.2 MMOL/L (ref 3.5–5.5)
PROT SERPL-MCNC: 5.5 G/DL (ref 5.7–8.2)
RBC # BLD AUTO: 2.47 10*6/MM3 (ref 4.2–5.76)
SODIUM BLD-SCNC: 136 MMOL/L (ref 132–146)
WBC NRBC COR # BLD: 2.3 10*3/MM3 (ref 3.5–10.8)

## 2017-11-09 PROCEDURE — 85025 COMPLETE CBC W/AUTO DIFF WBC: CPT

## 2017-11-09 PROCEDURE — 36415 COLL VENOUS BLD VENIPUNCTURE: CPT

## 2017-11-09 PROCEDURE — 80053 COMPREHEN METABOLIC PANEL: CPT | Performed by: INTERNAL MEDICINE

## 2017-11-16 ENCOUNTER — OFFICE VISIT (OUTPATIENT)
Dept: ONCOLOGY | Facility: CLINIC | Age: 77
End: 2017-11-16

## 2017-11-16 ENCOUNTER — TELEPHONE (OUTPATIENT)
Dept: ONCOLOGY | Facility: CLINIC | Age: 77
End: 2017-11-16

## 2017-11-16 ENCOUNTER — DOCUMENTATION (OUTPATIENT)
Dept: ONCOLOGY | Facility: CLINIC | Age: 77
End: 2017-11-16

## 2017-11-16 ENCOUNTER — SPECIALTY PHARMACY (OUTPATIENT)
Dept: ONCOLOGY | Facility: HOSPITAL | Age: 77
End: 2017-11-16

## 2017-11-16 ENCOUNTER — INFUSION (OUTPATIENT)
Dept: ONCOLOGY | Facility: HOSPITAL | Age: 77
End: 2017-11-16

## 2017-11-16 VITALS
SYSTOLIC BLOOD PRESSURE: 119 MMHG | DIASTOLIC BLOOD PRESSURE: 53 MMHG | BODY MASS INDEX: 23.36 KG/M2 | HEIGHT: 74 IN | HEART RATE: 63 BPM | WEIGHT: 182 LBS | RESPIRATION RATE: 16 BRPM | TEMPERATURE: 97.7 F

## 2017-11-16 DIAGNOSIS — C90.00 MULTIPLE MYELOMA NOT HAVING ACHIEVED REMISSION (HCC): ICD-10-CM

## 2017-11-16 DIAGNOSIS — D64.81 ANEMIA DUE TO CHEMOTHERAPY: Primary | ICD-10-CM

## 2017-11-16 DIAGNOSIS — C90.30 PLASMACYTOMA (HCC): Primary | ICD-10-CM

## 2017-11-16 DIAGNOSIS — T45.1X5A ANEMIA DUE TO CHEMOTHERAPY: Primary | ICD-10-CM

## 2017-11-16 DIAGNOSIS — C90.00 MULTIPLE MYELOMA NOT HAVING ACHIEVED REMISSION (HCC): Primary | ICD-10-CM

## 2017-11-16 DIAGNOSIS — C90.00 MULTIPLE MYELOMA, REMISSION STATUS UNSPECIFIED (HCC): ICD-10-CM

## 2017-11-16 DIAGNOSIS — D64.81 ANEMIA DUE TO CHEMOTHERAPY: ICD-10-CM

## 2017-11-16 DIAGNOSIS — T45.1X5A ANEMIA DUE TO CHEMOTHERAPY: ICD-10-CM

## 2017-11-16 LAB
ABO GROUP BLD: NORMAL
BLD GP AB SCN SERPL QL: NEGATIVE
ERYTHROCYTE [DISTWIDTH] IN BLOOD BY AUTOMATED COUNT: 25.3 % (ref 11.3–14.5)
HCT VFR BLD AUTO: 22.7 % (ref 38.9–50.9)
HGB BLD-MCNC: 7.4 G/DL (ref 13.1–17.5)
LYMPHOCYTES # BLD AUTO: 0.5 10*3/MM3 (ref 0.6–4.8)
LYMPHOCYTES NFR BLD AUTO: 35.9 % (ref 24–44)
MCH RBC QN AUTO: 32.9 PG (ref 27–31)
MCHC RBC AUTO-ENTMCNC: 32.7 G/DL (ref 32–36)
MCV RBC AUTO: 100.7 FL (ref 80–99)
MONOCYTES # BLD AUTO: 0.1 10*3/MM3 (ref 0–1)
MONOCYTES NFR BLD AUTO: 9.8 % (ref 0–12)
NEUTROPHILS # BLD AUTO: 0.8 10*3/MM3 (ref 1.5–8.3)
NEUTROPHILS NFR BLD AUTO: 54.3 % (ref 41–71)
PLATELET # BLD AUTO: 74 10*3/MM3 (ref 150–450)
PMV BLD AUTO: 8.6 FL (ref 6–12)
RBC # BLD AUTO: 2.25 10*6/MM3 (ref 4.2–5.76)
RH BLD: POSITIVE
WBC NRBC COR # BLD: 1.5 10*3/MM3 (ref 3.5–10.8)

## 2017-11-16 PROCEDURE — 36415 COLL VENOUS BLD VENIPUNCTURE: CPT

## 2017-11-16 PROCEDURE — 86850 RBC ANTIBODY SCREEN: CPT

## 2017-11-16 PROCEDURE — 86901 BLOOD TYPING SEROLOGIC RH(D): CPT

## 2017-11-16 PROCEDURE — 86923 COMPATIBILITY TEST ELECTRIC: CPT

## 2017-11-16 PROCEDURE — 99214 OFFICE O/P EST MOD 30 MIN: CPT | Performed by: INTERNAL MEDICINE

## 2017-11-16 PROCEDURE — 86900 BLOOD TYPING SEROLOGIC ABO: CPT

## 2017-11-16 PROCEDURE — 85025 COMPLETE CBC W/AUTO DIFF WBC: CPT

## 2017-11-16 RX ORDER — LENALIDOMIDE 15 MG/1
15 CAPSULE ORAL DAILY
Qty: 14 CAPSULE | Refills: 0 | Status: SHIPPED | OUTPATIENT
Start: 2017-11-16 | End: 2018-01-18

## 2017-11-16 RX ORDER — SODIUM CHLORIDE 9 MG/ML
250 INJECTION, SOLUTION INTRAVENOUS AS NEEDED
Status: CANCELLED | OUTPATIENT
Start: 2017-11-17

## 2017-11-16 NOTE — PROGRESS NOTES
Chief Complaint   Follow-up recently diagnosed multiple myeloma-RVD    PROBLEM LIST   Patient Active Problem List   Diagnosis   • CAD (coronary artery disease)   • Dyslipidemia   • Right hip pain   • Arthritis   • Cellulitis of left lower extremity   • Left carotid bruit   • Plasmacytoma   • Brain cancer   • Neck pain   • Anemia   • Multiple myeloma       HISTORY OF PRESENT ILLNESS:   Other than fatigue related to his anemia, Marcin feels well.  His neck pain has totally resolved.  He has no new neurologic or bony symptoms.  He's eating okay.  He is on a regular bowel regimen which has restored normal bowel function and is delighted because of it.  He has had no fevers or chills.  I held his Velcade last week because of his leukopenia    Past Medical History, Past Surgical History, Social History, Family History have been reviewed and are without significant changes except as mentioned.      Medications:      Current Outpatient Prescriptions:   •  acyclovir (ZOVIRAX) 400 MG tablet, Take 1 tablet by mouth 2 (Two) Times a Day., Disp: 60 tablet, Rfl: 7  •  allopurinol (ZYLOPRIM) 300 MG tablet, Take 1 tablet by mouth Daily., Disp: 30 tablet, Rfl: 11  •  aspirin 81 MG tablet, Take  by mouth daily., Disp: , Rfl:   •  atorvastatin (LIPITOR) 20 MG tablet, Take  by mouth. 1 QHS, Disp: , Rfl:   •  dexamethasone (DECADRON) 4 MG tablet, Take 10 tablets by mouth Daily With Breakfast. Take with food on Days 1, 8, 15., Disp: 30 tablet, Rfl: 3  •  ezetimibe (ZETIA) 10 MG tablet, Take 10 mg by mouth Daily., Disp: , Rfl:   •  glimepiride (AMARYL) 2 MG tablet, Take  by mouth daily., Disp: , Rfl:   •  latanoprost (XALATAN) 0.005 % ophthalmic solution, Apply  to eye. qhs, Disp: , Rfl:   •  lenalidomide (REVLIMID) 15 MG capsule, Take 1 capsule by mouth Daily. on days 1-14, [id number] Disp: 14 capsule, Rfl: 0  •  lenalidomide (REVLIMID) 15 MG capsule, Take 1 capsule by mouth Daily. on days 1-14,  "followed by 7 days off. REMS [id number], Adult Male, Disp: 14 capsule, Rfl: 0  •  lenalidomide (REVLIMID) 15 MG capsule, Take 1 capsule by mouth Daily. on days 1-14, followed by 7 days off. REMS [id number], Adult Male, Disp: 14 capsule, Rfl: 0  •  metoprolol succinate XL (TOPROL-XL) 25 MG 24 hr tablet, TAKE 1 TABLET BY MOUTH  DAILY, Disp: 90 tablet, Rfl: 1  •  nitroglycerin (NITROSTAT) 0.4 MG SL tablet, Place 1 tablet under the tongue every 5 (five) minutes as needed for chest pain., Disp: 25 tablet, Rfl: 6  •  ondansetron (ZOFRAN) 8 MG tablet, Take 1 tablet by mouth 3 (Three) Times a Day As Needed for Nausea or Vomiting., Disp: 30 tablet, Rfl: 5  •  ramipril (ALTACE) 5 MG capsule, Take  by mouth. 1 QD, Disp: , Rfl:   •  sulfamethoxazole-trimethoprim (BACTRIM DS) 800-160 MG per tablet, 1 tab PO 3x/week on M-W-F, Disp: 12 tablet, Rfl: 5    ALLERGIES:    Allergies   Allergen Reactions   • Penicillins    • Simvastatin    • Voltaren [Diclofenac Sodium]        ROS:  Review of Systems   Constitutional: Negative for activity change and appetite change.        Fatigue mentioned   HENT: Negative for mouth sores, sinus pressure and voice change.    Eyes: Negative for visual disturbance.   Respiratory: Negative for shortness of breath.    Cardiovascular: Negative for chest pain.   Gastrointestinal: Negative for abdominal pain and vomiting.   Genitourinary: Negative for dysuria.   Musculoskeletal: Negative for arthralgias and myalgias.   Skin: Negative for color change.   Neurological: Negative for dizziness, syncope and headaches.   Hematological: Negative for adenopathy.   Psychiatric/Behavioral: Negative for confusion, sleep disturbance and suicidal ideas. The patient is not nervous/anxious.            Objective:    /53 Comment: RUE  Pulse 63  Temp 97.7 °F (36.5 °C) (Temporal Artery )   Resp 16  Ht 74\" (188 cm)  Wt 182 lb (82.6 kg)  BMI 23.37 kg/m2    Physical Exam   Constitutional: He is oriented to person, place, and " time. He appears well-developed and well-nourished. No distress.   Somewhat pale but otherwise looks relaxed fatigue and quite comfortable.  He smiles readily.  He has excellent insight.  He gives a good hug.   HENT:   Head: Normocephalic.   Mouth/Throat: Oropharynx is clear and moist.   Eyes: Conjunctivae and EOM are normal. No scleral icterus.   Neck: Normal range of motion. Neck supple. No thyromegaly present.   Cardiovascular: Normal rate, regular rhythm and normal heart sounds.    No murmur heard.  Pulmonary/Chest: Effort normal and breath sounds normal. He has no wheezes. He has no rales.   Abdominal: Soft. Bowel sounds are normal. He exhibits no distension and no mass. There is no tenderness.   Musculoskeletal: He exhibits no edema or tenderness.   Lymphadenopathy:     He has no cervical adenopathy.   Neurological: He is alert and oriented to person, place, and time. He displays normal reflexes. No cranial nerve deficit.   Skin: Skin is warm and dry. No rash noted.   Psychiatric: He has a normal mood and affect. Judgment normal.   Vitals reviewed.             RECENT LABS:  Hematology WBC   Date Value Ref Range Status   11/09/2017 2.30 (L) 3.50 - 10.80 10*3/mm3 Final     Hemoglobin   Date Value Ref Range Status   11/09/2017 8.0 (L) 13.1 - 17.5 g/dL Final     Hematocrit   Date Value Ref Range Status   11/09/2017 24.9 (L) 38.9 - 50.9 % Final     MCV   Date Value Ref Range Status   11/09/2017 100.6 (H) 80.0 - 99.0 fL Final     RDW   Date Value Ref Range Status   11/09/2017 24.3 (H) 11.3 - 14.5 % Final     MPV   Date Value Ref Range Status   11/09/2017 9.6 6.0 - 12.0 fL Final     Platelets   Date Value Ref Range Status   11/09/2017 49 (L) 150 - 450 10*3/mm3 Final     Comment:     Verified by repeat analysis.      Immature Grans %   Date Value Ref Range Status   09/12/2017 0.3 0.0 - 0.6 % Final     Neutrophils, Absolute   Date Value Ref Range Status   11/09/2017 1.30 (L) 1.50 - 8.30 10*3/mm3 Final     Lymphocytes,  Absolute   Date Value Ref Range Status   11/09/2017 0.80 0.60 - 4.80 10*3/mm3 Final     Monocytes, Absolute   Date Value Ref Range Status   11/09/2017 0.10 0.00 - 1.00 10*3/mm3 Final     Eosinophils, Absolute   Date Value Ref Range Status   09/12/2017 0.03 0.00 - 0.30 10*3/mm3 Final     Basophils, Absolute   Date Value Ref Range Status   09/12/2017 0.00 0.00 - 0.20 10*3/mm3 Final     Immature Grans, Absolute   Date Value Ref Range Status   09/12/2017 0.01 0.00 - 0.03 10*3/mm3 Final     nRBC   Date Value Ref Range Status   03/19/2015 0.0  Final       Glucose   Date Value Ref Range Status   11/09/2017 166 (H) 70 - 100 mg/dL Final     Sodium   Date Value Ref Range Status   11/09/2017 136 132 - 146 mmol/L Final     Potassium   Date Value Ref Range Status   11/09/2017 4.2 3.5 - 5.5 mmol/L Final     CO2   Date Value Ref Range Status   11/09/2017 27.0 20.0 - 31.0 mmol/L Final     Chloride   Date Value Ref Range Status   11/09/2017 104 99 - 109 mmol/L Final     Anion Gap   Date Value Ref Range Status   11/09/2017 5.0 3.0 - 11.0 mmol/L Final     Creatinine   Date Value Ref Range Status   11/09/2017 0.80 0.60 - 1.30 mg/dL Final   11/02/2017 0.90 0.60 - 1.30 mg/dL Final     Comment:     Serial Number: [id number]:  [id number]     BUN   Date Value Ref Range Status   11/09/2017 18 9 - 23 mg/dL Final     BUN/Creatinine Ratio   Date Value Ref Range Status   11/09/2017 22.5 7.0 - 25.0 Final     Calcium   Date Value Ref Range Status   11/09/2017 8.5 (L) 8.7 - 10.4 mg/dL Final     eGFR Non  Amer   Date Value Ref Range Status   11/09/2017 93 >60 mL/min/1.73 Final     Alkaline Phosphatase   Date Value Ref Range Status   11/09/2017 66 25 - 100 U/L Final     Total Protein   Date Value Ref Range Status   11/09/2017 5.5 (L) 5.7 - 8.2 g/dL Final     ALT (SGPT)   Date Value Ref Range Status   11/09/2017 20 7 - 40 U/L Final     AST (SGOT)   Date Value Ref Range Status   11/09/2017 14 0 - 33 U/L Final     Total Bilirubin   Date Value Ref  Range Status   11/09/2017 0.6 0.3 - 1.2 mg/dL Final     Albumin   Date Value Ref Range Status   11/09/2017 3.50 3.20 - 4.80 g/dL Final     Globulin   Date Value Ref Range Status   11/09/2017 2.0 gm/dL Final     A/G Ratio   Date Value Ref Range Status   11/09/2017 1.8 1.5 - 2.5 g/dL Final          Perf Status: 1    Assessment/Plan    Diagnoses and all orders for this visit:    Plasmacytoma    Multiple myeloma, remission status unspecified      Marcin appears to be doing well.  He is tolerating his therapy well.  He is requiring transfusion therapy but that's not anything new, particularly early on in his treatment.  He will proceed with his chemotherapy.  I will follow-up today's labs of course.  I'll see him back in several weeks to a month.  All the above discussed with him      Naresh Turner MD , 11/16/2017    CC:

## 2017-11-16 NOTE — TELEPHONE ENCOUNTER
LM on patient vm.  Per Dr. Turner, hold Revlimid along with Velcade for the next 2 weeks until he sees Dr. Turner and restarts Velcade.  Patient instructed to call with any questions.

## 2017-11-16 NOTE — PROGRESS NOTES
The patient is neutropenic.  He will skip his Velcade this week and next week and he'll restart in about 2 weeks assuming his counts are improved.  He'll receive his blood tomorrow.  He will also be asked to hold his Revlimid until I see him back in 2 weeks.

## 2017-11-16 NOTE — PROGRESS NOTES
Oral Chemotherapy Teaching      Patient Name/:  Marcin Joyce   1940  Oral Chemotherapy Regimen:  Lenalidomide 15mg PO daily on days 1-14, followed by 7 days off + Dex 40mg weekly + Velcade weekly  Date Started Medication :Cycle 3 initiated on 17 (Day 15)    Cycle 1 17  Cycle 2: 10/12/17  Cycle 3: 17    Pt reports starting dexamethasone 40mg weekly on 17 as instructed.     Initial Teaching Follow Up Comments     Safety     Storage instructions (away from children; away from heat/cold, sunlight, or moisture), handling - use of gloves (caregivers), washing hands after touching pills, managing waste     “How are you storing your medications?”, reminders on storage, proper handling (caregivers using gloves, washing hands, away from children, managing waste, etc.), disposal of medication with D/C or dosage change    Pt reports appropriate handling and storage of oral chemotherapy.      Adherence      patient and/or caregiver on how to take medication, take with/without food, assess their adherence potential, stress importance of adherence, ways to manage adherence (pill boxes, phone reminders, calendars), what to do if miss a dose   “How are you taking your medication?” “How are you remembering to take your medication?”, “How many doses have you missed?”, determine reasons for non-adherence (not remembering, side effects, etc), ways to improve, overadherence? Remind patient of ways to improve/maintain adherence   Reports adherence to  Lenalidomide administered daily at same time for 14 days, followed by 7 days off. Reports finishing Lenalidomide 14 days on . Utilizing calendar. Will submit refill for next cycle of lenalidomide to begin on 17. Discussed supportive care medications which include: Allopurinol, bactrim, Aspirin, Acyclovir, and Zofran. Pt confirms complaince to supportive care medications.       Side Effects/Adverse Reactions      patient on potential side  "effects, s/s, ways to manage, when to call MD/seek help     Determine if patient experiencing side effects, ways to manage  Pt reports tolerating therapy at this time, reports some side effects noted, but \"nothing that I can't deal with\". Day 8 Velcade held last week per notes in chart.     Miscellaneous     Food interactions, DDIs, financial issues Determine if patient started any new medications since being placed on oral chemo (analyze for DDI) Will submit script to [facility] for next cycle of lenalidomide scheduled to begin on 11/23/17.       Additional Notes: Reviewed aforementioned material with patient. Reports currently tolerating medication. Submitted refill for next Cycle to [facility].     Labs reviewed by MD, plan to hold Velcade and Revlimid x 2 weeks, with plan to re-check labs in MD office prior to resumption of next cycle.   "

## 2017-11-17 ENCOUNTER — DOCUMENTATION (OUTPATIENT)
Dept: ONCOLOGY | Facility: CLINIC | Age: 77
End: 2017-11-17

## 2017-11-17 ENCOUNTER — INFUSION (OUTPATIENT)
Dept: ONCOLOGY | Facility: HOSPITAL | Age: 77
End: 2017-11-17

## 2017-11-17 VITALS
SYSTOLIC BLOOD PRESSURE: 115 MMHG | HEART RATE: 58 BPM | DIASTOLIC BLOOD PRESSURE: 46 MMHG | TEMPERATURE: 97.3 F | RESPIRATION RATE: 20 BRPM

## 2017-11-17 DIAGNOSIS — D64.81 ANEMIA DUE TO CHEMOTHERAPY: ICD-10-CM

## 2017-11-17 DIAGNOSIS — T45.1X5A ANEMIA DUE TO CHEMOTHERAPY: ICD-10-CM

## 2017-11-17 PROCEDURE — 36430 TRANSFUSION BLD/BLD COMPNT: CPT

## 2017-11-17 PROCEDURE — P9016 RBC LEUKOCYTES REDUCED: HCPCS

## 2017-11-17 PROCEDURE — 86900 BLOOD TYPING SEROLOGIC ABO: CPT

## 2017-11-17 RX ORDER — SODIUM CHLORIDE 9 MG/ML
250 INJECTION, SOLUTION INTRAVENOUS AS NEEDED
Status: DISCONTINUED | OUTPATIENT
Start: 2017-11-17 | End: 2017-11-17 | Stop reason: HOSPADM

## 2017-11-17 NOTE — PROGRESS NOTES
Patient has recurrent anemia and has been transfused and will require further transfusions due to his multiple myeloma and his treatment thereof.  I am going to start him on Procrit to try to lessen his transfusion needs.

## 2017-11-18 LAB
ABO + RH BLD: NORMAL
ABO + RH BLD: NORMAL
BH BB BLOOD EXPIRATION DATE: NORMAL
BH BB BLOOD EXPIRATION DATE: NORMAL
BH BB BLOOD TYPE BARCODE: 9500
BH BB BLOOD TYPE BARCODE: 9500
BH BB DISPENSE STATUS: NORMAL
BH BB DISPENSE STATUS: NORMAL
BH BB PRODUCT CODE: NORMAL
BH BB PRODUCT CODE: NORMAL
BH BB UNIT NUMBER: NORMAL
BH BB UNIT NUMBER: NORMAL
UNIT  ABO: NORMAL
UNIT  ABO: NORMAL
UNIT  RH: NORMAL
UNIT  RH: NORMAL

## 2017-11-20 DIAGNOSIS — C90.00 MULTIPLE MYELOMA NOT HAVING ACHIEVED REMISSION (HCC): ICD-10-CM

## 2017-11-20 RX ORDER — DEXAMETHASONE 4 MG/1
40 TABLET ORAL
Qty: 30 TABLET | Refills: 6 | Status: SHIPPED | OUTPATIENT
Start: 2017-11-20 | End: 2018-06-28

## 2017-11-30 ENCOUNTER — INFUSION (OUTPATIENT)
Dept: ONCOLOGY | Facility: HOSPITAL | Age: 77
End: 2017-11-30

## 2017-11-30 ENCOUNTER — TELEPHONE (OUTPATIENT)
Dept: ONCOLOGY | Facility: CLINIC | Age: 77
End: 2017-11-30

## 2017-11-30 VITALS
HEIGHT: 74 IN | DIASTOLIC BLOOD PRESSURE: 61 MMHG | WEIGHT: 185 LBS | HEART RATE: 70 BPM | RESPIRATION RATE: 16 BRPM | SYSTOLIC BLOOD PRESSURE: 136 MMHG | BODY MASS INDEX: 23.74 KG/M2 | TEMPERATURE: 97.7 F

## 2017-11-30 DIAGNOSIS — C90.00 MULTIPLE MYELOMA, REMISSION STATUS UNSPECIFIED (HCC): ICD-10-CM

## 2017-11-30 DIAGNOSIS — C90.00 MULTIPLE MYELOMA NOT HAVING ACHIEVED REMISSION (HCC): ICD-10-CM

## 2017-11-30 DIAGNOSIS — C90.00 MULTIPLE MYELOMA, REMISSION STATUS UNSPECIFIED (HCC): Primary | ICD-10-CM

## 2017-11-30 DIAGNOSIS — C90.30 PLASMACYTOMA (HCC): Primary | ICD-10-CM

## 2017-11-30 LAB
ABO GROUP BLD: NORMAL
BLD GP AB SCN SERPL QL: NEGATIVE
ERYTHROCYTE [DISTWIDTH] IN BLOOD BY AUTOMATED COUNT: 24.5 % (ref 11.3–14.5)
HCT VFR BLD AUTO: 23.2 % (ref 38.9–50.9)
HGB BLD-MCNC: 7.7 G/DL (ref 13.1–17.5)
LYMPHOCYTES # BLD AUTO: 0.8 10*3/MM3 (ref 0.6–4.8)
LYMPHOCYTES NFR BLD AUTO: 46.3 % (ref 24–44)
MCH RBC QN AUTO: 32.8 PG (ref 27–31)
MCHC RBC AUTO-ENTMCNC: 33.4 G/DL (ref 32–36)
MCV RBC AUTO: 98.2 FL (ref 80–99)
MONOCYTES # BLD AUTO: 0.2 10*3/MM3 (ref 0–1)
MONOCYTES NFR BLD AUTO: 9.5 % (ref 0–12)
NEUTROPHILS # BLD AUTO: 0.8 10*3/MM3 (ref 1.5–8.3)
NEUTROPHILS NFR BLD AUTO: 44.2 % (ref 41–71)
PLATELET # BLD AUTO: 57 10*3/MM3 (ref 150–450)
PMV BLD AUTO: 8.3 FL (ref 6–12)
RBC # BLD AUTO: 2.36 10*6/MM3 (ref 4.2–5.76)
RH BLD: POSITIVE
WBC NRBC COR # BLD: 1.8 10*3/MM3 (ref 3.5–10.8)

## 2017-11-30 PROCEDURE — 36415 COLL VENOUS BLD VENIPUNCTURE: CPT

## 2017-11-30 PROCEDURE — 86900 BLOOD TYPING SEROLOGIC ABO: CPT | Performed by: INTERNAL MEDICINE

## 2017-11-30 PROCEDURE — 86850 RBC ANTIBODY SCREEN: CPT | Performed by: INTERNAL MEDICINE

## 2017-11-30 PROCEDURE — 85025 COMPLETE CBC W/AUTO DIFF WBC: CPT | Performed by: INTERNAL MEDICINE

## 2017-11-30 PROCEDURE — 86923 COMPATIBILITY TEST ELECTRIC: CPT

## 2017-11-30 PROCEDURE — 96401 CHEMO ANTI-NEOPL SQ/IM: CPT

## 2017-11-30 PROCEDURE — 63510000001 EPOETIN ALFA PER 1000 UNITS: Performed by: INTERNAL MEDICINE

## 2017-11-30 PROCEDURE — 25010000002 BORTEZOMIB PER 0.1 MG: Performed by: INTERNAL MEDICINE

## 2017-11-30 PROCEDURE — 86901 BLOOD TYPING SEROLOGIC RH(D): CPT | Performed by: INTERNAL MEDICINE

## 2017-11-30 PROCEDURE — 96372 THER/PROPH/DIAG INJ SC/IM: CPT

## 2017-11-30 RX ORDER — SODIUM CHLORIDE 9 MG/ML
250 INJECTION, SOLUTION INTRAVENOUS AS NEEDED
Status: CANCELLED | OUTPATIENT
Start: 2017-11-30

## 2017-11-30 RX ORDER — BORTEZOMIB 3.5 MG/1
1.3 INJECTION, POWDER, LYOPHILIZED, FOR SOLUTION INTRAVENOUS; SUBCUTANEOUS ONCE
Status: COMPLETED | OUTPATIENT
Start: 2017-11-30 | End: 2017-11-30

## 2017-11-30 RX ADMIN — BORTEZOMIB 2.7 MG: 3.5 INJECTION, POWDER, LYOPHILIZED, FOR SOLUTION INTRAVENOUS; SUBCUTANEOUS at 15:20

## 2017-11-30 RX ADMIN — ERYTHROPOIETIN 40000 UNITS: 40000 INJECTION, SOLUTION INTRAVENOUS; SUBCUTANEOUS at 15:45

## 2017-11-30 NOTE — TELEPHONE ENCOUNTER
----- Message from Marlin Kaufman RN sent at 11/30/2017  2:12 PM EST -----  Regarding: GOHMANN/ cbc RESULT   Called wbc 1.8, ANC 0.8, HGB 7.7.   Reports feeling good, energy yesterday working in yard, NO SOA, Transfused couple weeks ago @  7.4.  Stated he will do whatever Dr wants.  Also need procrit order signed.    [id number]

## 2017-11-30 NOTE — TELEPHONE ENCOUNTER
Returned call to Amy patient's nurse informed her that after talking with Dr. Elliott. Have patient to not start on Relimid for a week and to T&C for 2 units of PRBC and transfuse tomorrow.

## 2017-12-01 ENCOUNTER — INFUSION (OUTPATIENT)
Dept: ONCOLOGY | Facility: HOSPITAL | Age: 77
End: 2017-12-01

## 2017-12-01 ENCOUNTER — TELEPHONE (OUTPATIENT)
Dept: ONCOLOGY | Facility: CLINIC | Age: 77
End: 2017-12-01

## 2017-12-01 VITALS
BODY MASS INDEX: 23.61 KG/M2 | TEMPERATURE: 98.1 F | WEIGHT: 184 LBS | HEIGHT: 74 IN | SYSTOLIC BLOOD PRESSURE: 131 MMHG | RESPIRATION RATE: 16 BRPM | HEART RATE: 61 BPM | DIASTOLIC BLOOD PRESSURE: 51 MMHG

## 2017-12-01 DIAGNOSIS — C90.00 MULTIPLE MYELOMA, REMISSION STATUS UNSPECIFIED (HCC): Primary | ICD-10-CM

## 2017-12-01 DIAGNOSIS — C90.00 MULTIPLE MYELOMA NOT HAVING ACHIEVED REMISSION (HCC): ICD-10-CM

## 2017-12-01 LAB
ALBUMIN SERPL-MCNC: 3.6 G/DL (ref 3.2–4.8)
ALBUMIN/GLOB SERPL: 1.6 G/DL (ref 1.5–2.5)
ALP SERPL-CCNC: 56 U/L (ref 25–100)
ALT SERPL W P-5'-P-CCNC: 22 U/L (ref 7–40)
ANION GAP SERPL CALCULATED.3IONS-SCNC: 8 MMOL/L (ref 3–11)
AST SERPL-CCNC: 17 U/L (ref 0–33)
BILIRUB SERPL-MCNC: 0.9 MG/DL (ref 0.3–1.2)
BUN BLD-MCNC: 23 MG/DL (ref 9–23)
BUN/CREAT SERPL: 32.9 (ref 7–25)
CALCIUM SPEC-SCNC: 8.6 MG/DL (ref 8.7–10.4)
CHLORIDE SERPL-SCNC: 106 MMOL/L (ref 99–109)
CO2 SERPL-SCNC: 24 MMOL/L (ref 20–31)
CREAT BLD-MCNC: 0.7 MG/DL (ref 0.6–1.3)
CREAT BLDA-MCNC: 0.6 MG/DL (ref 0.6–1.3)
CREAT BLDA-MCNC: 0.6 MG/DL (ref 0.6–1.3)
GFR SERPL CREATININE-BSD FRML MDRD: 109 ML/MIN/1.73
GLOBULIN UR ELPH-MCNC: 2.2 GM/DL
GLUCOSE BLD-MCNC: 226 MG/DL (ref 70–100)
MAGNESIUM SERPL-MCNC: 2.2 MG/DL (ref 1.3–2.7)
PHOSPHATE SERPL-MCNC: 2.6 MG/DL (ref 2.4–5.1)
POTASSIUM BLD-SCNC: 3.8 MMOL/L (ref 3.5–5.5)
PROT SERPL-MCNC: 5.8 G/DL (ref 5.7–8.2)
SODIUM BLD-SCNC: 138 MMOL/L (ref 132–146)

## 2017-12-01 PROCEDURE — 25010000002 ZOLEDRONIC ACID PER 1 MG: Performed by: INTERNAL MEDICINE

## 2017-12-01 PROCEDURE — 86900 BLOOD TYPING SEROLOGIC ABO: CPT

## 2017-12-01 PROCEDURE — 82565 ASSAY OF CREATININE: CPT

## 2017-12-01 PROCEDURE — 84100 ASSAY OF PHOSPHORUS: CPT

## 2017-12-01 PROCEDURE — 83735 ASSAY OF MAGNESIUM: CPT

## 2017-12-01 PROCEDURE — 96374 THER/PROPH/DIAG INJ IV PUSH: CPT

## 2017-12-01 PROCEDURE — P9016 RBC LEUKOCYTES REDUCED: HCPCS

## 2017-12-01 PROCEDURE — 80053 COMPREHEN METABOLIC PANEL: CPT

## 2017-12-01 PROCEDURE — 36430 TRANSFUSION BLD/BLD COMPNT: CPT

## 2017-12-01 PROCEDURE — 36415 COLL VENOUS BLD VENIPUNCTURE: CPT

## 2017-12-01 RX ORDER — SODIUM CHLORIDE 9 MG/ML
250 INJECTION, SOLUTION INTRAVENOUS ONCE
Status: CANCELLED | OUTPATIENT
Start: 2017-12-01

## 2017-12-01 RX ORDER — SODIUM CHLORIDE 9 MG/ML
250 INJECTION, SOLUTION INTRAVENOUS AS NEEDED
Status: ACTIVE | OUTPATIENT
Start: 2017-12-01

## 2017-12-01 RX ORDER — SODIUM CHLORIDE 9 MG/ML
250 INJECTION, SOLUTION INTRAVENOUS ONCE
Status: DISCONTINUED | OUTPATIENT
Start: 2017-12-01 | End: 2018-07-13

## 2017-12-01 RX ADMIN — ZOLEDRONIC ACID 4 MG: 4 INJECTION, SOLUTION, CONCENTRATE INTRAVENOUS at 14:14

## 2017-12-01 NOTE — TELEPHONE ENCOUNTER
----- Message from Sheila Parker, RN sent at 12/1/2017  9:07 AM EST -----  Regarding: GOTANYAAN- careplan  His careplan needs to be adjusted. He had Procrit and Velcade yesterday. It looks like he would get those again today. Does he come back on 12/7 for Velcade? Thank you, Sheila [phone]  Does he get Zometa as well? Order is not signed for 11/23.

## 2017-12-02 LAB
ABO + RH BLD: NORMAL
BH BB BLOOD EXPIRATION DATE: NORMAL
BH BB BLOOD TYPE BARCODE: 5100
BH BB DISPENSE STATUS: NORMAL
BH BB PRODUCT CODE: NORMAL
BH BB UNIT NUMBER: NORMAL
UNIT  ABO: NORMAL
UNIT  RH: NORMAL

## 2017-12-04 ENCOUNTER — TELEPHONE (OUTPATIENT)
Dept: ONCOLOGY | Facility: CLINIC | Age: 77
End: 2017-12-04

## 2017-12-04 NOTE — TELEPHONE ENCOUNTER
LM on patient vm.  Per Dr. Turner, continue to hold revlimid and velcade this week.  Patient due to get procrit shot.  Per CHRISTIAN Man he will get labs and procrit  on 12/7/17 in place of his velcade appt.  Instructed patient to call with any questions.

## 2017-12-06 DIAGNOSIS — C90.00 MULTIPLE MYELOMA NOT HAVING ACHIEVED REMISSION (HCC): ICD-10-CM

## 2017-12-07 ENCOUNTER — TELEPHONE (OUTPATIENT)
Dept: ONCOLOGY | Facility: CLINIC | Age: 77
End: 2017-12-07

## 2017-12-07 ENCOUNTER — INFUSION (OUTPATIENT)
Dept: ONCOLOGY | Facility: HOSPITAL | Age: 77
End: 2017-12-07

## 2017-12-07 VITALS
SYSTOLIC BLOOD PRESSURE: 131 MMHG | RESPIRATION RATE: 16 BRPM | DIASTOLIC BLOOD PRESSURE: 63 MMHG | HEIGHT: 74 IN | BODY MASS INDEX: 23.36 KG/M2 | TEMPERATURE: 97.6 F | WEIGHT: 182 LBS | HEART RATE: 63 BPM

## 2017-12-07 DIAGNOSIS — C90.00 MULTIPLE MYELOMA NOT HAVING ACHIEVED REMISSION (HCC): ICD-10-CM

## 2017-12-07 LAB
ERYTHROCYTE [DISTWIDTH] IN BLOOD BY AUTOMATED COUNT: 22.8 % (ref 11.3–14.5)
HCT VFR BLD AUTO: 30.5 % (ref 38.9–50.9)
HGB BLD-MCNC: 10.1 G/DL (ref 13.1–17.5)
LYMPHOCYTES # BLD AUTO: 0.7 10*3/MM3 (ref 0.6–4.8)
LYMPHOCYTES NFR BLD AUTO: 26.5 % (ref 24–44)
MCH RBC QN AUTO: 31.7 PG (ref 27–31)
MCHC RBC AUTO-ENTMCNC: 33.1 G/DL (ref 32–36)
MCV RBC AUTO: 95.9 FL (ref 80–99)
MONOCYTES # BLD AUTO: 0.04 10*3/MM3 (ref 0–1)
MONOCYTES NFR BLD AUTO: 1.5 % (ref 0–12)
NEUTROPHILS # BLD AUTO: 1.9 10*3/MM3 (ref 1.5–8.3)
NEUTROPHILS NFR BLD AUTO: 72 % (ref 41–71)
PLATELET # BLD AUTO: 77 10*3/MM3 (ref 150–450)
PMV BLD AUTO: 7.6 FL (ref 6–12)
RBC # BLD AUTO: 3.18 10*6/MM3 (ref 4.2–5.76)
WBC NRBC COR # BLD: 2.7 10*3/MM3 (ref 3.5–10.8)

## 2017-12-07 PROCEDURE — 85025 COMPLETE CBC W/AUTO DIFF WBC: CPT | Performed by: INTERNAL MEDICINE

## 2017-12-07 PROCEDURE — 36415 COLL VENOUS BLD VENIPUNCTURE: CPT

## 2017-12-07 RX ORDER — LENALIDOMIDE 15 MG/1
CAPSULE ORAL
OUTPATIENT
Start: 2017-12-07

## 2017-12-07 NOTE — TELEPHONE ENCOUNTER
Instructed patient to stay off Revlmid.  Check labs on 12/14/17 at Velcade appt in infusion.  Dr. Turner will see results and decide about Revlimid and Velcade at that time.  Patient stated understanding.

## 2017-12-07 NOTE — TELEPHONE ENCOUNTER
----- Message from Marlin Kaufman RN sent at 12/7/2017  2:14 PM EST -----  Regarding: ZAHEER Veronica   Held Velcade this week and will give procrit as indicated.  Did not need procrit. Mr Joyce needs clarification re: revlimid schedule.  He knows to hold this week  [phone] May call this number to clarify.

## 2017-12-08 DIAGNOSIS — C90.00 MULTIPLE MYELOMA NOT HAVING ACHIEVED REMISSION (HCC): ICD-10-CM

## 2017-12-11 RX ORDER — LENALIDOMIDE 15 MG/1
CAPSULE ORAL
OUTPATIENT
Start: 2017-12-11

## 2017-12-14 ENCOUNTER — TELEPHONE (OUTPATIENT)
Dept: ONCOLOGY | Facility: CLINIC | Age: 77
End: 2017-12-14

## 2017-12-14 ENCOUNTER — INFUSION (OUTPATIENT)
Dept: ONCOLOGY | Facility: HOSPITAL | Age: 77
End: 2017-12-14

## 2017-12-14 VITALS
DIASTOLIC BLOOD PRESSURE: 61 MMHG | WEIGHT: 180 LBS | HEART RATE: 72 BPM | HEIGHT: 74 IN | BODY MASS INDEX: 23.1 KG/M2 | RESPIRATION RATE: 16 BRPM | TEMPERATURE: 97.5 F | SYSTOLIC BLOOD PRESSURE: 134 MMHG

## 2017-12-14 DIAGNOSIS — C90.00 MULTIPLE MYELOMA NOT HAVING ACHIEVED REMISSION (HCC): Primary | ICD-10-CM

## 2017-12-14 LAB
CREAT BLDA-MCNC: 0.7 MG/DL (ref 0.6–1.3)
ERYTHROCYTE [DISTWIDTH] IN BLOOD BY AUTOMATED COUNT: 21.9 % (ref 11.3–14.5)
HCT VFR BLD AUTO: 29.9 % (ref 38.9–50.9)
HGB BLD-MCNC: 9.9 G/DL (ref 13.1–17.5)
LYMPHOCYTES # BLD AUTO: 1 10*3/MM3 (ref 0.6–4.8)
LYMPHOCYTES NFR BLD AUTO: 19 % (ref 24–44)
MCH RBC QN AUTO: 31.5 PG (ref 27–31)
MCHC RBC AUTO-ENTMCNC: 33.1 G/DL (ref 32–36)
MCV RBC AUTO: 95.1 FL (ref 80–99)
MONOCYTES # BLD AUTO: 0.1 10*3/MM3 (ref 0–1)
MONOCYTES NFR BLD AUTO: 1.9 % (ref 0–12)
NEUTROPHILS # BLD AUTO: 4 10*3/MM3 (ref 1.5–8.3)
NEUTROPHILS NFR BLD AUTO: 79.1 % (ref 41–71)
PLATELET # BLD AUTO: 79 10*3/MM3 (ref 150–450)
PMV BLD AUTO: 7.5 FL (ref 6–12)
RBC # BLD AUTO: 3.15 10*6/MM3 (ref 4.2–5.76)
WBC NRBC COR # BLD: 5.1 10*3/MM3 (ref 3.5–10.8)

## 2017-12-14 PROCEDURE — 96401 CHEMO ANTI-NEOPL SQ/IM: CPT

## 2017-12-14 PROCEDURE — 63510000001 EPOETIN ALFA PER 1000 UNITS: Performed by: INTERNAL MEDICINE

## 2017-12-14 PROCEDURE — 36415 COLL VENOUS BLD VENIPUNCTURE: CPT

## 2017-12-14 PROCEDURE — 82565 ASSAY OF CREATININE: CPT

## 2017-12-14 PROCEDURE — 85025 COMPLETE CBC W/AUTO DIFF WBC: CPT

## 2017-12-14 PROCEDURE — 25010000002 BORTEZOMIB PER 0.1 MG: Performed by: INTERNAL MEDICINE

## 2017-12-14 PROCEDURE — 96372 THER/PROPH/DIAG INJ SC/IM: CPT

## 2017-12-14 RX ORDER — BORTEZOMIB 3.5 MG/1
1.3 INJECTION, POWDER, LYOPHILIZED, FOR SOLUTION INTRAVENOUS; SUBCUTANEOUS ONCE
Status: COMPLETED | OUTPATIENT
Start: 2017-12-14 | End: 2017-12-14

## 2017-12-14 RX ADMIN — BORTEZOMIB 2.7 MG: 3.5 INJECTION, POWDER, LYOPHILIZED, FOR SOLUTION INTRAVENOUS; SUBCUTANEOUS at 14:39

## 2017-12-14 RX ADMIN — ERYTHROPOIETIN 40000 UNITS: 40000 INJECTION, SOLUTION INTRAVENOUS; SUBCUTANEOUS at 14:40

## 2017-12-14 NOTE — TELEPHONE ENCOUNTER
JOSE ENRIQUE on patient vm.  Dr. Turner instructs to restart Revlimid but we will continue to hold Velcade until appt on 12/21/17.  Marine, pharmacist also notified.

## 2017-12-15 ENCOUNTER — SPECIALTY PHARMACY (OUTPATIENT)
Dept: ONCOLOGY | Facility: HOSPITAL | Age: 77
End: 2017-12-15

## 2017-12-15 NOTE — PROGRESS NOTES
Oral Chemotherapy Teaching      Patient Name/:  Marcin Joyce   1940  Oral Chemotherapy Regimen:  Lenalidomide 15mg PO daily on days 1-14, followed by 7 days off + Dex 40mg weekly + Velcade weekly  Date Started Medication :Cycle 3 initiated on 17 (Day 15)    Cycle 1 17  Cycle 2: 10/12/17  Cycle 3: 17   Cycle 4: originally due to start 17 (however held/deferred given counts)     - Plan to resume Revlimid 17; called and spoke with patient this morning. Patient reports having 13 capsules of revlimid remaining to complete this cycle from previous fill which had been held, instructed patient to continue 14 days on, followed by 7 days off. Will next Revlimid cycle due 18. Will mail updated calendar.       Initial Teaching Follow Up Comments     Safety     Storage instructions (away from children; away from heat/cold, sunlight, or moisture), handling - use of gloves (caregivers), washing hands after touching pills, managing waste     “How are you storing your medications?”, reminders on storage, proper handling (caregivers using gloves, washing hands, away from children, managing waste, etc.), disposal of medication with D/C or dosage change    Pt reports appropriate handling and storage of oral chemotherapy.      Adherence      patient and/or caregiver on how to take medication, take with/without food, assess their adherence potential, stress importance of adherence, ways to manage adherence (pill boxes, phone reminders, calendars), what to do if miss a dose   “How are you taking your medication?” “How are you remembering to take your medication?”, “How many doses have you missed?”, determine reasons for non-adherence (not remembering, side effects, etc), ways to improve, overadherence? Remind patient of ways to improve/maintain adherence   Discussed plan to resume Lenalidomide, pt reports having 13 capsules remaining which he will take daily, followed by 7 days off. Will not  need new script until next cycle on 1/4/18. Pt reports complaince to Dex weekly, takes on Thursdays each week. Reports receiving most recent dose of Velcade on  12/14/17. Plan per team to HOLD Velcade until lab check at follow-up on12/21/17. Utilizing calendar, requested updated calendar. will mail.Discussed supportive care medications which include: Allopurinol, bactrim, Aspirin, Acyclovir, and Zofran. Pt confirms complaince to supportive care medications.       Side Effects/Adverse Reactions      patient on potential side effects, s/s, ways to manage, when to call MD/seek help     Determine if patient experiencing side effects, ways to manage  Resuming Revlimid, plan to hold Velcade pending next follow-up/labs.      Miscellaneous     Food interactions, DDIs, financial issues Determine if patient started any new medications since being placed on oral chemo (analyze for DDI) Pt has medication on hand to get him through current cycle.      Additional Notes: Reviewed aforementioned material with patient. Will mail updated calendar.

## 2017-12-21 ENCOUNTER — OFFICE VISIT (OUTPATIENT)
Dept: ONCOLOGY | Facility: CLINIC | Age: 77
End: 2017-12-21

## 2017-12-21 ENCOUNTER — INFUSION (OUTPATIENT)
Dept: ONCOLOGY | Facility: HOSPITAL | Age: 77
End: 2017-12-21

## 2017-12-21 VITALS
DIASTOLIC BLOOD PRESSURE: 59 MMHG | WEIGHT: 178 LBS | TEMPERATURE: 97.1 F | HEART RATE: 69 BPM | RESPIRATION RATE: 16 BRPM | BODY MASS INDEX: 22.84 KG/M2 | SYSTOLIC BLOOD PRESSURE: 136 MMHG | HEIGHT: 74 IN

## 2017-12-21 DIAGNOSIS — C90.00 MULTIPLE MYELOMA NOT HAVING ACHIEVED REMISSION (HCC): ICD-10-CM

## 2017-12-21 DIAGNOSIS — C90.00 MULTIPLE MYELOMA, REMISSION STATUS UNSPECIFIED (HCC): Primary | ICD-10-CM

## 2017-12-21 DIAGNOSIS — C90.00 MULTIPLE MYELOMA NOT HAVING ACHIEVED REMISSION (HCC): Primary | ICD-10-CM

## 2017-12-21 LAB
ALBUMIN SERPL-MCNC: 3.7 G/DL (ref 3.2–4.8)
ALBUMIN/GLOB SERPL: 1.5 G/DL (ref 1.5–2.5)
ALP SERPL-CCNC: 58 U/L (ref 25–100)
ALT SERPL W P-5'-P-CCNC: 40 U/L (ref 7–40)
ANION GAP SERPL CALCULATED.3IONS-SCNC: 6 MMOL/L (ref 3–11)
AST SERPL-CCNC: 30 U/L (ref 0–33)
BILIRUB SERPL-MCNC: 0.7 MG/DL (ref 0.3–1.2)
BUN BLD-MCNC: 18 MG/DL (ref 9–23)
BUN/CREAT SERPL: 22.5 (ref 7–25)
CALCIUM SPEC-SCNC: 8.9 MG/DL (ref 8.7–10.4)
CHLORIDE SERPL-SCNC: 99 MMOL/L (ref 99–109)
CO2 SERPL-SCNC: 28 MMOL/L (ref 20–31)
CREAT BLD-MCNC: 0.8 MG/DL (ref 0.6–1.3)
CREAT BLDA-MCNC: 0.8 MG/DL (ref 0.6–1.3)
ERYTHROCYTE [DISTWIDTH] IN BLOOD BY AUTOMATED COUNT: 23.3 % (ref 11.3–14.5)
GFR SERPL CREATININE-BSD FRML MDRD: 93 ML/MIN/1.73
GLOBULIN UR ELPH-MCNC: 2.5 GM/DL
GLUCOSE BLD-MCNC: 200 MG/DL (ref 70–100)
HCT VFR BLD AUTO: 27.1 % (ref 38.9–50.9)
HGB BLD-MCNC: 8.9 G/DL (ref 13.1–17.5)
LYMPHOCYTES # BLD AUTO: 0.9 10*3/MM3 (ref 0.6–4.8)
LYMPHOCYTES NFR BLD AUTO: 18.7 % (ref 24–44)
MCH RBC QN AUTO: 31 PG (ref 27–31)
MCHC RBC AUTO-ENTMCNC: 32.6 G/DL (ref 32–36)
MCV RBC AUTO: 95 FL (ref 80–99)
MONOCYTES # BLD AUTO: 0.2 10*3/MM3 (ref 0–1)
MONOCYTES NFR BLD AUTO: 3.6 % (ref 0–12)
NEUTROPHILS # BLD AUTO: 3.7 10*3/MM3 (ref 1.5–8.3)
NEUTROPHILS NFR BLD AUTO: 77.7 % (ref 41–71)
PLATELET # BLD AUTO: 62 10*3/MM3 (ref 150–450)
PMV BLD AUTO: 8.3 FL (ref 6–12)
POTASSIUM BLD-SCNC: 3.7 MMOL/L (ref 3.5–5.5)
PROT SERPL-MCNC: 6.2 G/DL (ref 5.7–8.2)
RBC # BLD AUTO: 2.86 10*6/MM3 (ref 4.2–5.76)
SODIUM BLD-SCNC: 133 MMOL/L (ref 132–146)
WBC NRBC COR # BLD: 4.7 10*3/MM3 (ref 3.5–10.8)

## 2017-12-21 PROCEDURE — 96401 CHEMO ANTI-NEOPL SQ/IM: CPT

## 2017-12-21 PROCEDURE — 36415 COLL VENOUS BLD VENIPUNCTURE: CPT

## 2017-12-21 PROCEDURE — 63510000001 EPOETIN ALFA PER 1000 UNITS: Performed by: INTERNAL MEDICINE

## 2017-12-21 PROCEDURE — 85025 COMPLETE CBC W/AUTO DIFF WBC: CPT | Performed by: INTERNAL MEDICINE

## 2017-12-21 PROCEDURE — 82565 ASSAY OF CREATININE: CPT

## 2017-12-21 PROCEDURE — 80053 COMPREHEN METABOLIC PANEL: CPT | Performed by: INTERNAL MEDICINE

## 2017-12-21 PROCEDURE — 25010000002 BORTEZOMIB PER 0.1 MG: Performed by: INTERNAL MEDICINE

## 2017-12-21 PROCEDURE — 96372 THER/PROPH/DIAG INJ SC/IM: CPT

## 2017-12-21 PROCEDURE — 99214 OFFICE O/P EST MOD 30 MIN: CPT | Performed by: INTERNAL MEDICINE

## 2017-12-21 RX ORDER — BORTEZOMIB 3.5 MG/1
1.3 INJECTION, POWDER, LYOPHILIZED, FOR SOLUTION INTRAVENOUS; SUBCUTANEOUS ONCE
Status: CANCELLED | OUTPATIENT
Start: 2017-12-28

## 2017-12-21 RX ORDER — BORTEZOMIB 3.5 MG/1
1.3 INJECTION, POWDER, LYOPHILIZED, FOR SOLUTION INTRAVENOUS; SUBCUTANEOUS ONCE
Status: COMPLETED | OUTPATIENT
Start: 2017-12-21 | End: 2017-12-21

## 2017-12-21 RX ORDER — BORTEZOMIB 3.5 MG/1
1.3 INJECTION, POWDER, LYOPHILIZED, FOR SOLUTION INTRAVENOUS; SUBCUTANEOUS ONCE
Status: CANCELLED | OUTPATIENT
Start: 2018-01-03

## 2017-12-21 RX ORDER — BORTEZOMIB 3.5 MG/1
1.3 INJECTION, POWDER, LYOPHILIZED, FOR SOLUTION INTRAVENOUS; SUBCUTANEOUS ONCE
Status: CANCELLED | OUTPATIENT
Start: 2017-12-21

## 2017-12-21 RX ADMIN — ERYTHROPOIETIN 40000 UNITS: 40000 INJECTION, SOLUTION INTRAVENOUS; SUBCUTANEOUS at 11:28

## 2017-12-21 RX ADMIN — BORTEZOMIB 2.7 MG: 3.5 INJECTION, POWDER, LYOPHILIZED, FOR SOLUTION INTRAVENOUS; SUBCUTANEOUS at 11:43

## 2017-12-21 NOTE — PROGRESS NOTES
Chief Complaint   Follow-up multiple myeloma, relatively non-secret danna, diagnosed September 2017    PROBLEM LIST   Patient Active Problem List   Diagnosis   • CAD (coronary artery disease)   • Dyslipidemia   • Right hip pain   • Arthritis   • Cellulitis of left lower extremity   • Left carotid bruit   • Plasmacytoma   • Brain cancer   • Neck pain   • Anemia   • Multiple myeloma       HISTORY OF PRESENT ILLNESS:   Marcin feels fairly well.  Procrit has finally started to benefit him with hemoglobins now in the 10 range.  He only recently restarted his Revlimid last week.  He was off both the Velcade and Revlimid because of thrombocytopenia.  He's had no bleeding issues.  He's had no recent infections at all.    Past Medical History, Past Surgical History, Social History, Family History have been reviewed and are without significant changes except as mentioned.      Medications:      Current Outpatient Prescriptions:   •  acyclovir (ZOVIRAX) 400 MG tablet, Take 1 tablet by mouth 2 (Two) Times a Day., Disp: 60 tablet, Rfl: 7  •  allopurinol (ZYLOPRIM) 300 MG tablet, Take 1 tablet by mouth Daily., Disp: 30 tablet, Rfl: 11  •  aspirin 81 MG tablet, Take  by mouth daily., Disp: , Rfl:   •  atorvastatin (LIPITOR) 20 MG tablet, Take  by mouth. 1 QHS, Disp: , Rfl:   •  dexamethasone (DECADRON) 4 MG tablet, Take 10 tablets by mouth Daily With Breakfast. Take with food on Days 1, 8, 15., Disp: 30 tablet, Rfl: 6  •  ezetimibe (ZETIA) 10 MG tablet, Take 10 mg by mouth Daily., Disp: , Rfl:   •  glimepiride (AMARYL) 2 MG tablet, Take  by mouth daily., Disp: , Rfl:   •  latanoprost (XALATAN) 0.005 % ophthalmic solution, Apply  to eye. qhs, Disp: , Rfl:   •  lenalidomide (REVLIMID) 15 MG capsule, Take 1 capsule by mouth Daily. on days 1-14, followed by 7 days off. REMS [id number], Adult Male, Disp: 14 capsule, Rfl: 0  •  lenalidomide (REVLIMID) 15 MG capsule, Take 1 capsule by mouth Daily. on days 1-14, followed by 7 days off. REMS  [id number], Adult Male, Disp: 14 capsule, Rfl: 0  •  lenalidomide (REVLIMID) 15 MG capsule, Take 1 capsule by mouth Daily. on days 1-14, followed by 7 days off. REMS [id number], Adult Male, Disp: 14 capsule, Rfl: 0  •  lenalidomide (REVLIMID) 15 MG capsule, Take 1 capsule by mouth Daily. on days 1-14, followed by 7 days off. REMS [id number], Adult Male, Disp: 14 capsule, Rfl: 0  •  metoprolol succinate XL (TOPROL-XL) 25 MG 24 hr tablet, TAKE 1 TABLET BY MOUTH  DAILY, Disp: 90 tablet, Rfl: 1  •  nitroglycerin (NITROSTAT) 0.4 MG SL tablet, Place 1 tablet under the tongue every 5 (five) minutes as needed for chest pain., Disp: 25 tablet, Rfl: 6  •  ondansetron (ZOFRAN) 8 MG tablet, Take 1 tablet by mouth 3 (Three) Times a Day As Needed for Nausea or Vomiting., Disp: 30 tablet, Rfl: 5  •  ramipril (ALTACE) 5 MG capsule, Take  by mouth. 1 QD, Disp: , Rfl:   •  sulfamethoxazole-trimethoprim (BACTRIM DS) 800-160 MG per tablet, 1 tab PO 3x/week on M-W-F, Disp: 12 tablet, Rfl: 5  No current facility-administered medications for this visit.     Facility-Administered Medications Ordered in Other Visits:   •  sodium chloride 0.9 % infusion 250 mL, 250 mL, Intravenous, PRN, Inocencia Felipe MD  •  sodium chloride 0.9 % infusion 250 mL, 250 mL, Intravenous, Once, Naresh Turner MD    ALLERGIES:    Allergies   Allergen Reactions   • Penicillins    • Simvastatin    • Voltaren [Diclofenac Sodium]        ROS:  Review of Systems   Constitutional: Negative for activity change and appetite change.        Fatigue is better with improved hemoglobin is due to Procrit   HENT: Negative for mouth sores, sinus pressure and voice change.    Eyes: Negative for visual disturbance.   Respiratory: Negative for shortness of breath.    Cardiovascular: Negative for chest pain.   Gastrointestinal: Negative for abdominal pain and vomiting.   Genitourinary: Negative for dysuria.   Musculoskeletal: Negative for arthralgias and myalgias.        No further neck  "pain which she had about 6 weeks ago.   Skin: Negative for color change.   Neurological: Negative for dizziness, syncope and headaches.   Hematological: Negative for adenopathy.   Psychiatric/Behavioral: Negative for confusion, sleep disturbance and suicidal ideas. The patient is not nervous/anxious.            Objective:    /59 Comment: RUE  Pulse 69  Temp 97.1 °F (36.2 °C) (Temporal Artery )   Resp 16  Ht 188 cm (74\")  Wt 80.7 kg (178 lb)  BMI 22.85 kg/m2    Physical Exam   Constitutional: He is oriented to person, place, and time. He appears well-developed and well-nourished. No distress.   HENT:   Head: Normocephalic.   Mouth/Throat: Oropharynx is clear and moist.   Eyes: Conjunctivae and EOM are normal. No scleral icterus.   Neck: Normal range of motion. Neck supple. No thyromegaly present.   Cardiovascular: Normal rate, regular rhythm and normal heart sounds.    No murmur heard.  Pulmonary/Chest: Effort normal and breath sounds normal. He has no wheezes. He has no rales.   Abdominal: Soft. Bowel sounds are normal. He exhibits no distension and no mass. There is no tenderness.   Musculoskeletal: He exhibits no edema or tenderness.   Lymphadenopathy:     He has no cervical adenopathy.   Neurological: He is alert and oriented to person, place, and time. He displays normal reflexes. No cranial nerve deficit.   Skin: Skin is warm and dry. No rash noted.   Psychiatric: He has a normal mood and affect. Judgment normal.   Vitals reviewed.             RECENT LABS:  Hematology WBC   Date Value Ref Range Status   12/14/2017 5.10 3.50 - 10.80 10*3/mm3 Final     Hemoglobin   Date Value Ref Range Status   12/14/2017 9.9 (L) 13.1 - 17.5 g/dL Final     Hematocrit   Date Value Ref Range Status   12/14/2017 29.9 (L) 38.9 - 50.9 % Final     MCV   Date Value Ref Range Status   12/14/2017 95.1 80.0 - 99.0 fL Final     RDW   Date Value Ref Range Status   12/14/2017 21.9 (H) 11.3 - 14.5 % Final     MPV   Date Value Ref " Range Status   12/14/2017 7.5 6.0 - 12.0 fL Final     Platelets   Date Value Ref Range Status   12/14/2017 79 (L) 150 - 450 10*3/mm3 Final     Immature Grans %   Date Value Ref Range Status   09/12/2017 0.3 0.0 - 0.6 % Final     Neutrophils, Absolute   Date Value Ref Range Status   12/14/2017 4.00 1.50 - 8.30 10*3/mm3 Final     Lymphocytes, Absolute   Date Value Ref Range Status   12/14/2017 1.00 0.60 - 4.80 10*3/mm3 Final     Monocytes, Absolute   Date Value Ref Range Status   12/14/2017 0.10 0.00 - 1.00 10*3/mm3 Final     Eosinophils, Absolute   Date Value Ref Range Status   09/12/2017 0.03 0.00 - 0.30 10*3/mm3 Final     Basophils, Absolute   Date Value Ref Range Status   09/12/2017 0.00 0.00 - 0.20 10*3/mm3 Final     Immature Grans, Absolute   Date Value Ref Range Status   09/12/2017 0.01 0.00 - 0.03 10*3/mm3 Final     nRBC   Date Value Ref Range Status   03/19/2015 0.0  Final       Glucose   Date Value Ref Range Status   12/01/2017 226 (H) 70 - 100 mg/dL Final     Sodium   Date Value Ref Range Status   12/01/2017 138 132 - 146 mmol/L Final     Potassium   Date Value Ref Range Status   12/01/2017 3.8 3.5 - 5.5 mmol/L Final     CO2   Date Value Ref Range Status   12/01/2017 24.0 20.0 - 31.0 mmol/L Final     Chloride   Date Value Ref Range Status   12/01/2017 106 99 - 109 mmol/L Final     Anion Gap   Date Value Ref Range Status   12/01/2017 8.0 3.0 - 11.0 mmol/L Final     Creatinine   Date Value Ref Range Status   12/14/2017 0.70 0.60 - 1.30 mg/dL Final     Comment:     Serial Number: [id number]:  [id number]     BUN   Date Value Ref Range Status   12/01/2017 23 9 - 23 mg/dL Final     BUN/Creatinine Ratio   Date Value Ref Range Status   12/01/2017 32.9 (H) 7.0 - 25.0 Final     Calcium   Date Value Ref Range Status   12/01/2017 8.6 (L) 8.7 - 10.4 mg/dL Final     eGFR Non  Amer   Date Value Ref Range Status   12/01/2017 109 >60 mL/min/1.73 Final     Alkaline Phosphatase   Date Value Ref Range Status    12/01/2017 56 25 - 100 U/L Final     Total Protein   Date Value Ref Range Status   12/01/2017 5.8 5.7 - 8.2 g/dL Final     ALT (SGPT)   Date Value Ref Range Status   12/01/2017 22 7 - 40 U/L Final     AST (SGOT)   Date Value Ref Range Status   12/01/2017 17 0 - 33 U/L Final     Total Bilirubin   Date Value Ref Range Status   12/01/2017 0.9 0.3 - 1.2 mg/dL Final     Albumin   Date Value Ref Range Status   12/01/2017 3.60 3.20 - 4.80 g/dL Final     Globulin   Date Value Ref Range Status   12/01/2017 2.2 gm/dL Final     A/G Ratio   Date Value Ref Range Status   12/01/2017 1.6 1.5 - 2.5 g/dL Final          Perf Status: 0    Assessment/Plan    Diagnoses and all orders for this visit:    Multiple myeloma, remission status unspecified      I think Marcin is responding to therapy.  On the other hand I can't really measure his protein values to prove this.  I'm going to restart his Velcade.  He'll continue his Revlimid.  I'll see him back in a few weeks.  I will probably repeat his bone marrow biopsy in about a month.      Naresh Turner MD , 12/21/2017    CC:

## 2017-12-27 DIAGNOSIS — C90.00 MULTIPLE MYELOMA NOT HAVING ACHIEVED REMISSION (HCC): ICD-10-CM

## 2017-12-27 RX ORDER — SODIUM CHLORIDE 9 MG/ML
250 INJECTION, SOLUTION INTRAVENOUS ONCE
Status: CANCELLED | OUTPATIENT
Start: 2017-12-28

## 2017-12-28 ENCOUNTER — INFUSION (OUTPATIENT)
Dept: ONCOLOGY | Facility: HOSPITAL | Age: 77
End: 2017-12-28

## 2017-12-28 ENCOUNTER — TRANSCRIBE ORDERS (OUTPATIENT)
Dept: ONCOLOGY | Facility: CLINIC | Age: 77
End: 2017-12-28

## 2017-12-28 VITALS
SYSTOLIC BLOOD PRESSURE: 110 MMHG | BODY MASS INDEX: 22.72 KG/M2 | RESPIRATION RATE: 16 BRPM | WEIGHT: 177 LBS | DIASTOLIC BLOOD PRESSURE: 55 MMHG | HEIGHT: 74 IN | TEMPERATURE: 97.9 F | HEART RATE: 73 BPM

## 2017-12-28 DIAGNOSIS — C90.00 MULTIPLE MYELOMA, REMISSION STATUS UNSPECIFIED (HCC): ICD-10-CM

## 2017-12-28 DIAGNOSIS — C90.30 PLASMACYTOMA (HCC): ICD-10-CM

## 2017-12-28 DIAGNOSIS — Z92.89 HISTORY OF BLOOD PRODUCT TRANSFUSION: Primary | ICD-10-CM

## 2017-12-28 DIAGNOSIS — C90.00 MULTIPLE MYELOMA NOT HAVING ACHIEVED REMISSION (HCC): Primary | ICD-10-CM

## 2017-12-28 LAB
ALBUMIN SERPL-MCNC: 3.8 G/DL (ref 3.2–4.8)
ALBUMIN/GLOB SERPL: 1.7 G/DL (ref 1.5–2.5)
ALP SERPL-CCNC: 57 U/L (ref 25–100)
ALT SERPL W P-5'-P-CCNC: 27 U/L (ref 7–40)
ANION GAP SERPL CALCULATED.3IONS-SCNC: 8 MMOL/L (ref 3–11)
AST SERPL-CCNC: 17 U/L (ref 0–33)
BILIRUB SERPL-MCNC: 0.8 MG/DL (ref 0.3–1.2)
BUN BLD-MCNC: 23 MG/DL (ref 9–23)
BUN/CREAT SERPL: 25.6 (ref 7–25)
CALCIUM SPEC-SCNC: 8.4 MG/DL (ref 8.7–10.4)
CHLORIDE SERPL-SCNC: 102 MMOL/L (ref 99–109)
CO2 SERPL-SCNC: 22 MMOL/L (ref 20–31)
CREAT BLD-MCNC: 0.9 MG/DL (ref 0.6–1.3)
CREAT BLDA-MCNC: 0.8 MG/DL (ref 0.6–1.3)
CREAT BLDA-MCNC: 0.8 MG/DL (ref 0.6–1.3)
CRP SERPL-MCNC: 0.06 MG/DL (ref 0–1)
ERYTHROCYTE [DISTWIDTH] IN BLOOD BY AUTOMATED COUNT: 23.6 % (ref 11.3–14.5)
ERYTHROCYTE [SEDIMENTATION RATE] IN BLOOD: 4 MM/HR (ref 0–20)
GFR SERPL CREATININE-BSD FRML MDRD: 81 ML/MIN/1.73
GLOBULIN UR ELPH-MCNC: 2.2 GM/DL
GLUCOSE BLD-MCNC: 234 MG/DL (ref 70–100)
HCT VFR BLD AUTO: 22.9 % (ref 38.9–50.9)
HGB BLD-MCNC: 7.5 G/DL (ref 13.1–17.5)
LYMPHOCYTES # BLD AUTO: 0.5 10*3/MM3 (ref 0.6–4.8)
LYMPHOCYTES NFR BLD AUTO: 13.6 % (ref 24–44)
MAGNESIUM SERPL-MCNC: 2.3 MG/DL (ref 1.3–2.7)
MCH RBC QN AUTO: 31.1 PG (ref 27–31)
MCHC RBC AUTO-ENTMCNC: 33 G/DL (ref 32–36)
MCV RBC AUTO: 94.1 FL (ref 80–99)
MONOCYTES # BLD AUTO: 0.1 10*3/MM3 (ref 0–1)
MONOCYTES NFR BLD AUTO: 2.2 % (ref 0–12)
NEUTROPHILS # BLD AUTO: 3.3 10*3/MM3 (ref 1.5–8.3)
NEUTROPHILS NFR BLD AUTO: 84.2 % (ref 41–71)
PHOSPHATE SERPL-MCNC: 2.2 MG/DL (ref 2.4–5.1)
PLATELET # BLD AUTO: 36 10*3/MM3 (ref 150–450)
PMV BLD AUTO: 9.3 FL (ref 6–12)
POTASSIUM BLD-SCNC: 4.4 MMOL/L (ref 3.5–5.5)
PROT SERPL-MCNC: 6 G/DL (ref 5.7–8.2)
RBC # BLD AUTO: 2.43 10*6/MM3 (ref 4.2–5.76)
SODIUM BLD-SCNC: 132 MMOL/L (ref 132–146)
WBC NRBC COR # BLD: 3.9 10*3/MM3 (ref 3.5–10.8)

## 2017-12-28 PROCEDURE — 83735 ASSAY OF MAGNESIUM: CPT | Performed by: INTERNAL MEDICINE

## 2017-12-28 PROCEDURE — 80053 COMPREHEN METABOLIC PANEL: CPT | Performed by: INTERNAL MEDICINE

## 2017-12-28 PROCEDURE — 86334 IMMUNOFIX E-PHORESIS SERUM: CPT | Performed by: INTERNAL MEDICINE

## 2017-12-28 PROCEDURE — 63510000001 EPOETIN ALFA PER 1000 UNITS: Performed by: INTERNAL MEDICINE

## 2017-12-28 PROCEDURE — 84100 ASSAY OF PHOSPHORUS: CPT | Performed by: INTERNAL MEDICINE

## 2017-12-28 PROCEDURE — 96374 THER/PROPH/DIAG INJ IV PUSH: CPT

## 2017-12-28 PROCEDURE — 82565 ASSAY OF CREATININE: CPT

## 2017-12-28 PROCEDURE — 36415 COLL VENOUS BLD VENIPUNCTURE: CPT

## 2017-12-28 PROCEDURE — 25010000002 ZOLEDRONIC ACID PER 1 MG: Performed by: INTERNAL MEDICINE

## 2017-12-28 PROCEDURE — 96372 THER/PROPH/DIAG INJ SC/IM: CPT

## 2017-12-28 PROCEDURE — 83883 ASSAY NEPHELOMETRY NOT SPEC: CPT | Performed by: INTERNAL MEDICINE

## 2017-12-28 PROCEDURE — 84165 PROTEIN E-PHORESIS SERUM: CPT | Performed by: INTERNAL MEDICINE

## 2017-12-28 PROCEDURE — 86140 C-REACTIVE PROTEIN: CPT | Performed by: INTERNAL MEDICINE

## 2017-12-28 PROCEDURE — 85025 COMPLETE CBC W/AUTO DIFF WBC: CPT | Performed by: INTERNAL MEDICINE

## 2017-12-28 PROCEDURE — 82232 ASSAY OF BETA-2 PROTEIN: CPT | Performed by: INTERNAL MEDICINE

## 2017-12-28 RX ORDER — ACETAMINOPHEN 325 MG/1
650 TABLET ORAL ONCE
Status: CANCELLED | OUTPATIENT
Start: 2017-12-28 | End: 2017-12-28

## 2017-12-28 RX ORDER — SODIUM CHLORIDE 9 MG/ML
250 INJECTION, SOLUTION INTRAVENOUS ONCE
Status: DISCONTINUED | OUTPATIENT
Start: 2017-12-28 | End: 2017-12-28 | Stop reason: HOSPADM

## 2017-12-28 RX ORDER — DIPHENHYDRAMINE HCL 25 MG
25 CAPSULE ORAL ONCE
Status: CANCELLED | OUTPATIENT
Start: 2017-12-28 | End: 2017-12-28

## 2017-12-28 RX ORDER — SODIUM CHLORIDE 9 MG/ML
250 INJECTION, SOLUTION INTRAVENOUS AS NEEDED
Status: CANCELLED | OUTPATIENT
Start: 2017-12-28

## 2017-12-28 RX ADMIN — ZOLEDRONIC ACID 4 MG: 4 INJECTION, SOLUTION, CONCENTRATE INTRAVENOUS at 14:13

## 2017-12-28 RX ADMIN — ERYTHROPOIETIN 40000 UNITS: 40000 INJECTION, SOLUTION INTRAVENOUS; SUBCUTANEOUS at 14:20

## 2017-12-29 ENCOUNTER — TELEPHONE (OUTPATIENT)
Dept: ONCOLOGY | Facility: CLINIC | Age: 77
End: 2017-12-29

## 2017-12-29 ENCOUNTER — LAB (OUTPATIENT)
Dept: INFUSION THERAPY | Facility: HOSPITAL | Age: 77
End: 2017-12-29
Attending: INTERNAL MEDICINE

## 2017-12-29 ENCOUNTER — OFFICE VISIT (OUTPATIENT)
Dept: INFUSION THERAPY | Facility: HOSPITAL | Age: 77
End: 2017-12-29
Attending: INTERNAL MEDICINE

## 2017-12-29 ENCOUNTER — SPECIALTY PHARMACY (OUTPATIENT)
Dept: ONCOLOGY | Facility: HOSPITAL | Age: 77
End: 2017-12-29

## 2017-12-29 VITALS
TEMPERATURE: 97.6 F | HEIGHT: 73 IN | OXYGEN SATURATION: 99 % | WEIGHT: 176.15 LBS | BODY MASS INDEX: 23.35 KG/M2 | RESPIRATION RATE: 18 BRPM | HEART RATE: 67 BPM | DIASTOLIC BLOOD PRESSURE: 44 MMHG | SYSTOLIC BLOOD PRESSURE: 117 MMHG

## 2017-12-29 VITALS
RESPIRATION RATE: 18 BRPM | SYSTOLIC BLOOD PRESSURE: 133 MMHG | HEART RATE: 56 BPM | DIASTOLIC BLOOD PRESSURE: 69 MMHG | TEMPERATURE: 97.6 F

## 2017-12-29 DIAGNOSIS — C90.00 MULTIPLE MYELOMA NOT HAVING ACHIEVED REMISSION (HCC): Primary | ICD-10-CM

## 2017-12-29 DIAGNOSIS — C90.00 MULTIPLE MYELOMA NOT HAVING ACHIEVED REMISSION (HCC): ICD-10-CM

## 2017-12-29 LAB
ABO GROUP BLD: NORMAL
ALBUMIN SERPL-MCNC: 3.5 G/DL (ref 2.9–4.4)
ALBUMIN/GLOB SERPL: 1.5 {RATIO} (ref 0.7–1.7)
ALPHA1 GLOB FLD ELPH-MCNC: 0.2 G/DL (ref 0–0.4)
ALPHA2 GLOB SERPL ELPH-MCNC: 0.6 G/DL (ref 0.4–1)
B-GLOBULIN SERPL ELPH-MCNC: 0.9 G/DL (ref 0.7–1.3)
B2 MICROGLOB SERPL-MCNC: 1.5 MG/L (ref 0.6–2.4)
BLD GP AB SCN SERPL QL: NEGATIVE
GAMMA GLOB SERPL ELPH-MCNC: 0.7 G/DL (ref 0.4–1.8)
GLOBULIN SER CALC-MCNC: 2.5 G/DL (ref 2.2–3.9)
GLUCOSE BLDC GLUCOMTR-MCNC: 179 MG/DL (ref 70–130)
GLUCOSE BLDC GLUCOMTR-MCNC: 201 MG/DL (ref 70–130)
IGA SERPL-MCNC: 102 MG/DL (ref 61–437)
IGG SERPL-MCNC: 667 MG/DL (ref 700–1600)
IGM SERPL-MCNC: 72 MG/DL (ref 15–143)
INTERPRETATION SERPL IEP-IMP: ABNORMAL
KAPPA LC SERPL-MCNC: 15.3 MG/L (ref 3.3–19.4)
KAPPA LC/LAMBDA SER: 1.35 {RATIO} (ref 0.26–1.65)
LAMBDA LC FREE SERPL-MCNC: 11.3 MG/L (ref 5.7–26.3)
Lab: ABNORMAL
M-SPIKE: ABNORMAL G/DL
PROT SERPL-MCNC: 6 G/DL (ref 6–8.5)
RH BLD: POSITIVE

## 2017-12-29 PROCEDURE — 36430 TRANSFUSION BLD/BLD COMPNT: CPT

## 2017-12-29 PROCEDURE — 86900 BLOOD TYPING SEROLOGIC ABO: CPT

## 2017-12-29 PROCEDURE — 82962 GLUCOSE BLOOD TEST: CPT | Performed by: INTERNAL MEDICINE

## 2017-12-29 PROCEDURE — P9016 RBC LEUKOCYTES REDUCED: HCPCS

## 2017-12-29 PROCEDURE — 86850 RBC ANTIBODY SCREEN: CPT | Performed by: INTERNAL MEDICINE

## 2017-12-29 PROCEDURE — 86901 BLOOD TYPING SEROLOGIC RH(D): CPT | Performed by: INTERNAL MEDICINE

## 2017-12-29 PROCEDURE — 86920 COMPATIBILITY TEST SPIN: CPT

## 2017-12-29 PROCEDURE — 86900 BLOOD TYPING SEROLOGIC ABO: CPT | Performed by: INTERNAL MEDICINE

## 2017-12-29 RX ORDER — SODIUM CHLORIDE 9 MG/ML
250 INJECTION, SOLUTION INTRAVENOUS AS NEEDED
Status: DISCONTINUED | OUTPATIENT
Start: 2017-12-29 | End: 2018-01-02 | Stop reason: HOSPADM

## 2017-12-29 RX ORDER — ACETAMINOPHEN 325 MG/1
650 TABLET ORAL ONCE
Status: DISCONTINUED | OUTPATIENT
Start: 2017-12-29 | End: 2018-01-02 | Stop reason: HOSPADM

## 2017-12-29 RX ORDER — LENALIDOMIDE 15 MG/1
15 CAPSULE ORAL DAILY
Qty: 14 CAPSULE | Refills: 0 | Status: SHIPPED | OUTPATIENT
Start: 2017-12-29 | End: 2018-01-18

## 2017-12-29 RX ORDER — DIPHENHYDRAMINE HCL 25 MG
25 CAPSULE ORAL ONCE
Status: DISCONTINUED | OUTPATIENT
Start: 2017-12-29 | End: 2018-01-02 | Stop reason: HOSPADM

## 2017-12-29 RX ORDER — LENALIDOMIDE 15 MG/1
15 CAPSULE ORAL DAILY
Qty: 14 CAPSULE | Refills: 0 | Status: CANCELLED | OUTPATIENT
Start: 2018-01-03

## 2017-12-29 NOTE — TELEPHONE ENCOUNTER
----- Message from Anika Santos sent at 12/29/2017  1:06 PM EST -----  Regarding: [facility] - ORDER NEEDED  Contact: [phone]  [facility] CALLED FOR AN ORDER NEEDED FOR RBC TRANSFUSE 2 UNITS EACH OVER 4 HOURS.     SHE SAID SHE CAN'T SEE ANYTHING. AND PATIENT IS THERE

## 2017-12-29 NOTE — PROGRESS NOTES
Oral Chemotherapy Teaching      Patient Name/:  Marcin Joyce   1940  Oral Chemotherapy Regimen:  Lenalidomide 15mg PO daily on days 1-14, followed by 7 days off + Dex 40mg weekly + Velcade weekly  Date Started Medication :Cycle 3 initiated on 17 (Day 15)    Cycle 1 17  Cycle 2: 10/12/17  Cycle 3: 17   Cycle 4: originally due to start 17 (however held/deferred given counts) -Held Velcade 2017  Cycle 5: anticipate 2018 pending counts and MD appt        Initial Teaching Follow Up Comments     Safety     Storage instructions (away from children; away from heat/cold, sunlight, or moisture), handling - use of gloves (caregivers), washing hands after touching pills, managing waste     “How are you storing your medications?”, reminders on storage, proper handling (caregivers using gloves, washing hands, away from children, managing waste, etc.), disposal of medication with D/C or dosage change    Pt reports appropriate handling and storage of oral chemotherapy.      Adherence      patient and/or caregiver on how to take medication, take with/without food, assess their adherence potential, stress importance of adherence, ways to manage adherence (pill boxes, phone reminders, calendars), what to do if miss a dose   “How are you taking your medication?” “How are you remembering to take your medication?”, “How many doses have you missed?”, determine reasons for non-adherence (not remembering, side effects, etc), ways to improve, overadherence? Remind patient of ways to improve/maintain adherence   Patient reports finishing his last dose of Revlimid for this cycle on 2018. Presented to infusion on 17 for Velcade. However, reports feeling lethargic and anemic; Velcade held 2018. Pt reports complaince to Revlimid at home, taking capsules as prescribed. Discussed tenative plan for next cycle on 2018., However would have labs drawn at appt on 18 and see MD on that  day as well before starting and to check labs.          Side Effects/Adverse Reactions      patient on potential side effects, s/s, ways to manage, when to call MD/seek help     Determine if patient experiencing side effects, ways to manage  Finished most recent cycle of  Revlimid 12/27/18, plan to hold Velcade pending next follow-up/labs. Pt reports feeling fatigued, tired, and sluggish over the past few days.      Miscellaneous     Food interactions, DDIs, financial issues Determine if patient started any new medications since being placed on oral chemo (analyze for DDI) Will submit script for next cycle, due on 1/4/2018. Pt aware to hold until MD follow-up and labs on 1/4/2018.      Additional Notes: Reviewed aforementioned material with patient. Provided updated tentative calendar.

## 2017-12-29 NOTE — TELEPHONE ENCOUNTER
Called Nurse to see that she was able to view orders to transfuse 2 units of PRBC. Nurse informed informed me she had orders at this time.

## 2018-01-02 ENCOUNTER — TELEPHONE (OUTPATIENT)
Dept: INTERVENTIONAL RADIOLOGY/VASCULAR | Facility: HOSPITAL | Age: 78
End: 2018-01-02

## 2018-01-02 NOTE — TELEPHONE ENCOUNTER
@[id number])@    Other Comments:

## 2018-01-03 LAB
ABO + RH BLD: NORMAL
ABO + RH BLD: NORMAL
BH BB BLOOD EXPIRATION DATE: NORMAL
BH BB BLOOD EXPIRATION DATE: NORMAL
BH BB BLOOD TYPE BARCODE: 5100
BH BB BLOOD TYPE BARCODE: 5100
BH BB DISPENSE STATUS: NORMAL
BH BB DISPENSE STATUS: NORMAL
BH BB PRODUCT CODE: NORMAL
BH BB PRODUCT CODE: NORMAL
BH BB UNIT NUMBER: NORMAL
BH BB UNIT NUMBER: NORMAL
CROSSMATCH INTERPRETATION: NORMAL
CROSSMATCH INTERPRETATION: NORMAL
UNIT  ABO: NORMAL
UNIT  ABO: NORMAL
UNIT  RH: NORMAL
UNIT  RH: NORMAL

## 2018-01-04 ENCOUNTER — INFUSION (OUTPATIENT)
Dept: ONCOLOGY | Facility: HOSPITAL | Age: 78
End: 2018-01-04

## 2018-01-04 ENCOUNTER — OFFICE VISIT (OUTPATIENT)
Dept: ONCOLOGY | Facility: CLINIC | Age: 78
End: 2018-01-04

## 2018-01-04 VITALS
SYSTOLIC BLOOD PRESSURE: 123 MMHG | DIASTOLIC BLOOD PRESSURE: 61 MMHG | BODY MASS INDEX: 23.86 KG/M2 | TEMPERATURE: 97.1 F | RESPIRATION RATE: 18 BRPM | HEART RATE: 54 BPM | WEIGHT: 180 LBS | HEIGHT: 73 IN

## 2018-01-04 DIAGNOSIS — C90.00 MULTIPLE MYELOMA NOT HAVING ACHIEVED REMISSION (HCC): Primary | ICD-10-CM

## 2018-01-04 DIAGNOSIS — C90.00 MULTIPLE MYELOMA, REMISSION STATUS UNSPECIFIED (HCC): ICD-10-CM

## 2018-01-04 DIAGNOSIS — C90.30 PLASMACYTOMA (HCC): Primary | ICD-10-CM

## 2018-01-04 DIAGNOSIS — C90.00 MULTIPLE MYELOMA, REMISSION STATUS UNSPECIFIED (HCC): Primary | ICD-10-CM

## 2018-01-04 LAB
ERYTHROCYTE [DISTWIDTH] IN BLOOD BY AUTOMATED COUNT: 21.7 % (ref 11.3–14.5)
HCT VFR BLD AUTO: 29.8 % (ref 38.9–50.9)
HGB BLD-MCNC: 9.8 G/DL (ref 13.1–17.5)
LYMPHOCYTES # BLD AUTO: 0.8 10*3/MM3 (ref 0.6–4.8)
LYMPHOCYTES NFR BLD AUTO: 33.9 % (ref 24–44)
MCH RBC QN AUTO: 31.4 PG (ref 27–31)
MCHC RBC AUTO-ENTMCNC: 32.7 G/DL (ref 32–36)
MCV RBC AUTO: 96.2 FL (ref 80–99)
MONOCYTES # BLD AUTO: 0.1 10*3/MM3 (ref 0–1)
MONOCYTES NFR BLD AUTO: 4.3 % (ref 0–12)
NEUTROPHILS # BLD AUTO: 1.5 10*3/MM3 (ref 1.5–8.3)
NEUTROPHILS NFR BLD AUTO: 61.8 % (ref 41–71)
PLATELET # BLD AUTO: 53 10*3/MM3 (ref 150–450)
PMV BLD AUTO: 7.5 FL (ref 6–12)
RBC # BLD AUTO: 3.1 10*6/MM3 (ref 4.2–5.76)
WBC NRBC COR # BLD: 2.4 10*3/MM3 (ref 3.5–10.8)

## 2018-01-04 PROCEDURE — 85025 COMPLETE CBC W/AUTO DIFF WBC: CPT | Performed by: INTERNAL MEDICINE

## 2018-01-04 PROCEDURE — 96372 THER/PROPH/DIAG INJ SC/IM: CPT

## 2018-01-04 PROCEDURE — 99213 OFFICE O/P EST LOW 20 MIN: CPT | Performed by: INTERNAL MEDICINE

## 2018-01-04 PROCEDURE — 36415 COLL VENOUS BLD VENIPUNCTURE: CPT

## 2018-01-04 PROCEDURE — 63510000001 EPOETIN ALFA PER 1000 UNITS: Performed by: INTERNAL MEDICINE

## 2018-01-04 RX ADMIN — ERYTHROPOIETIN 40000 UNITS: 40000 INJECTION, SOLUTION INTRAVENOUS; SUBCUTANEOUS at 14:27

## 2018-01-04 NOTE — PROGRESS NOTES
Chief Complaint   Follow-up multiple myeloma    PROBLEM LIST   Patient Active Problem List   Diagnosis   • CAD (coronary artery disease)   • Dyslipidemia   • Right hip pain   • Arthritis   • Cellulitis of left lower extremity   • Left carotid bruit   • Plasmacytoma   • Brain cancer   • Neck pain   • Anemia   • Multiple myeloma       HISTORY OF PRESENT ILLNESS:   Marcin feels well.  No recent fevers or chills.  I again had to stop his current medications because of cytopenias.  He has no new neurologic or bony symptoms.  Family of course remains very supportive  Past Medical History, Past Surgical History, Social History, Family History have been reviewed and are without significant changes except as mentioned.      Medications:      Current Outpatient Prescriptions:   •  acyclovir (ZOVIRAX) 400 MG tablet, Take 1 tablet by mouth 2 (Two) Times a Day., Disp: 60 tablet, Rfl: 7  •  allopurinol (ZYLOPRIM) 300 MG tablet, Take 1 tablet by mouth Daily., Disp: 30 tablet, Rfl: 11  •  aspirin 81 MG tablet, Take  by mouth daily., Disp: , Rfl:   •  atorvastatin (LIPITOR) 20 MG tablet, Take  by mouth. 1 QHS, Disp: , Rfl:   •  dexamethasone (DECADRON) 4 MG tablet, Take 10 tablets by mouth Daily With Breakfast. Take with food on Days 1, 8, 15., Disp: 30 tablet, Rfl: 6  •  ezetimibe (ZETIA) 10 MG tablet, Take 10 mg by mouth Daily., Disp: , Rfl:   •  glimepiride (AMARYL) 2 MG tablet, Take  by mouth daily., Disp: , Rfl:   •  latanoprost (XALATAN) 0.005 % ophthalmic solution, Apply  to eye. qhs, Disp: , Rfl:   •  lenalidomide (REVLIMID) 15 MG capsule, Take 1 capsule by mouth Daily. on days 1-14, followed by 7 days off. REMS [id number], Adult Male, Disp: 14 capsule, Rfl: 0  •  lenalidomide (REVLIMID) 15 MG capsule, Take 1 capsule by mouth Daily. on days 1-14, followed by 7 days off. REMS [id number], Adult Male, Disp: 14 capsule, Rfl: 0  •  lenalidomide (REVLIMID) 15 MG capsule, Take 1 capsule by mouth Daily. on days 1-14, followed by 7 days off.  "REMS [id number], Adult Male, Disp: 14 capsule, Rfl: 0  •  lenalidomide (REVLIMID) 15 MG capsule, Take 1 capsule by mouth Daily. on days 1-14, followed by 7 days off. REMS [id number], Adult Male, Disp: 14 capsule, Rfl: 0  •  lenalidomide (REVLIMID) 15 MG capsule, Take 1 capsule by mouth Daily. on days 1-14, followed by 7 days off. REMS [id number] Adult Male, Disp: 14 capsule, Rfl: 0  •  metoprolol succinate XL (TOPROL-XL) 25 MG 24 hr tablet, TAKE 1 TABLET BY MOUTH  DAILY, Disp: 90 tablet, Rfl: 1  •  nitroglycerin (NITROSTAT) 0.4 MG SL tablet, Place 1 tablet under the tongue every 5 (five) minutes as needed for chest pain., Disp: 25 tablet, Rfl: 6  •  ondansetron (ZOFRAN) 8 MG tablet, Take 1 tablet by mouth 3 (Three) Times a Day As Needed for Nausea or Vomiting., Disp: 30 tablet, Rfl: 5  •  ramipril (ALTACE) 5 MG capsule, Take  by mouth. 1 QD, Disp: , Rfl:   •  sulfamethoxazole-trimethoprim (BACTRIM DS) 800-160 MG per tablet, 1 tab PO 3x/week on M-W-F, Disp: 12 tablet, Rfl: 5  No current facility-administered medications for this visit.     Facility-Administered Medications Ordered in Other Visits:   •  sodium chloride 0.9 % infusion 250 mL, 250 mL, Intravenous, PRN, Inocencia Felipe MD  •  sodium chloride 0.9 % infusion 250 mL, 250 mL, Intravenous, Once, Naresh Turner MD    ALLERGIES:    Allergies   Allergen Reactions   • Penicillins Other (See Comments)     \"broke out in sweat\"   • Simvastatin Myalgia     Swelling and pain in shoulder   • Voltaren [Diclofenac Sodium]        ROS:  Review of Systems   Constitutional: Negative for activity change and appetite change.   HENT: Negative for mouth sores, sinus pressure and voice change.    Eyes: Negative for visual disturbance.   Respiratory: Negative for shortness of breath.         No shortness of breath   Cardiovascular: Negative for chest pain.   Gastrointestinal: Negative for abdominal pain and vomiting.   Genitourinary: Negative for dysuria.   Musculoskeletal: Negative " "for arthralgias and myalgias.   Skin: Negative for color change.   Neurological: Negative for dizziness, syncope and headaches.   Hematological: Negative for adenopathy.   Psychiatric/Behavioral: Negative for confusion, sleep disturbance and suicidal ideas. The patient is not nervous/anxious.      Energy level is fair no new bony or neurologic symptoms      Objective:    /61 Comment: RUE  Pulse 54  Temp 97.1 °F (36.2 °C) (Temporal Artery )   Resp 18  Ht 185.4 cm (73\")  Wt 81.6 kg (180 lb)  BMI 23.75 kg/m2    Physical Exam   Constitutional: He is oriented to person, place, and time. He appears well-developed and well-nourished. No distress.   Appears healthy vigorous and younger than stated age   HENT:   Head: Normocephalic.   Mouth/Throat: Oropharynx is clear and moist.   Eyes: Conjunctivae and EOM are normal. No scleral icterus.   Neck: Normal range of motion. Neck supple. No thyromegaly present.   Cardiovascular: Normal rate, regular rhythm and normal heart sounds.    No murmur heard.  Pulmonary/Chest: Effort normal and breath sounds normal. He has no wheezes. He has no rales.   Abdominal: Soft. Bowel sounds are normal. He exhibits no distension and no mass. There is no tenderness.   Musculoskeletal: He exhibits no edema or tenderness.   Lymphadenopathy:     He has no cervical adenopathy.   Neurological: He is alert and oriented to person, place, and time. He displays normal reflexes. No cranial nerve deficit.   Skin: Skin is warm and dry. No rash noted.   Psychiatric: He has a normal mood and affect. Judgment normal.   Vitals reviewed.             RECENT LABS:  Hematology WBC   Date Value Ref Range Status   12/28/2017 3.90 3.50 - 10.80 10*3/mm3 Final     Hemoglobin   Date Value Ref Range Status   12/28/2017 7.5 (L) 13.1 - 17.5 g/dL Final     Hematocrit   Date Value Ref Range Status   12/28/2017 22.9 (L) 38.9 - 50.9 % Final     MCV   Date Value Ref Range Status   12/28/2017 94.1 80.0 - 99.0 fL Final "     RDW   Date Value Ref Range Status   12/28/2017 23.6 (H) 11.3 - 14.5 % Final     MPV   Date Value Ref Range Status   12/28/2017 9.3 6.0 - 12.0 fL Final     Platelets   Date Value Ref Range Status   12/28/2017 36 (L) 150 - 450 10*3/mm3 Final     Comment:     Verified by repeat analysis.      Immature Grans %   Date Value Ref Range Status   09/12/2017 0.3 0.0 - 0.6 % Final     Neutrophils, Absolute   Date Value Ref Range Status   12/28/2017 3.30 1.50 - 8.30 10*3/mm3 Final     Lymphocytes, Absolute   Date Value Ref Range Status   12/28/2017 0.50 (L) 0.60 - 4.80 10*3/mm3 Final     Monocytes, Absolute   Date Value Ref Range Status   12/28/2017 0.10 0.00 - 1.00 10*3/mm3 Final     Eosinophils, Absolute   Date Value Ref Range Status   09/12/2017 0.03 0.00 - 0.30 10*3/mm3 Final     Basophils, Absolute   Date Value Ref Range Status   09/12/2017 0.00 0.00 - 0.20 10*3/mm3 Final     Immature Grans, Absolute   Date Value Ref Range Status   09/12/2017 0.01 0.00 - 0.03 10*3/mm3 Final     nRBC   Date Value Ref Range Status   03/19/2015 0.0  Final       Glucose   Date Value Ref Range Status   12/28/2017 234 (H) 70 - 100 mg/dL Final     Sodium   Date Value Ref Range Status   12/28/2017 132 132 - 146 mmol/L Final     Potassium   Date Value Ref Range Status   12/28/2017 4.4 3.5 - 5.5 mmol/L Final     CO2   Date Value Ref Range Status   12/28/2017 22.0 20.0 - 31.0 mmol/L Final     Chloride   Date Value Ref Range Status   12/28/2017 102 99 - 109 mmol/L Final     Anion Gap   Date Value Ref Range Status   12/28/2017 8.0 3.0 - 11.0 mmol/L Final     Creatinine   Date Value Ref Range Status   12/28/2017 0.80 0.60 - 1.30 mg/dL Final     BUN   Date Value Ref Range Status   12/28/2017 23 9 - 23 mg/dL Final     BUN/Creatinine Ratio   Date Value Ref Range Status   12/28/2017 25.6 (H) 7.0 - 25.0 Final     Calcium   Date Value Ref Range Status   12/28/2017 8.4 (L) 8.7 - 10.4 mg/dL Final     eGFR Non  Amer   Date Value Ref Range Status    12/28/2017 81 >60 mL/min/1.73 Final     Alkaline Phosphatase   Date Value Ref Range Status   12/28/2017 57 25 - 100 U/L Final     Total Protein   Date Value Ref Range Status   12/28/2017 6.0 5.7 - 8.2 g/dL Final     ALT (SGPT)   Date Value Ref Range Status   12/28/2017 27 7 - 40 U/L Final     AST (SGOT)   Date Value Ref Range Status   12/28/2017 17 0 - 33 U/L Final     Total Bilirubin   Date Value Ref Range Status   12/28/2017 0.8 0.3 - 1.2 mg/dL Final     Albumin   Date Value Ref Range Status   12/28/2017 3.80 3.20 - 4.80 g/dL Final   12/28/2017 3.5 2.9 - 4.4 g/dL Final     Globulin   Date Value Ref Range Status   12/28/2017 2.2 gm/dL Final     A/G Ratio   Date Value Ref Range Status   12/28/2017 1.7 1.5 - 2.5 g/dL Final   12/28/2017 1.5 0.7 - 1.7 Final          Perf Status:0    Assessment/Plan    Diagnoses and all orders for this visit:    Plasmacytoma    Multiple myeloma, remission status unspecified      I think Marcin is responded well to this therapy but he is not a secretor so I'm going to need to repeat his bone marrow.  I'll get that arranged for the next week or 2.  All the above discussed with the patient.      Naresh Turner MD , 1/4/2018    CC:

## 2018-01-09 ENCOUNTER — PROCEDURE VISIT (OUTPATIENT)
Dept: ONCOLOGY | Facility: HOSPITAL | Age: 78
End: 2018-01-09

## 2018-01-09 VITALS
SYSTOLIC BLOOD PRESSURE: 114 MMHG | RESPIRATION RATE: 16 BRPM | OXYGEN SATURATION: 99 % | HEIGHT: 73 IN | TEMPERATURE: 97.7 F | BODY MASS INDEX: 24.15 KG/M2 | HEART RATE: 51 BPM | DIASTOLIC BLOOD PRESSURE: 50 MMHG | WEIGHT: 182.2 LBS

## 2018-01-09 DIAGNOSIS — C90.30 PLASMACYTOMA (HCC): ICD-10-CM

## 2018-01-09 DIAGNOSIS — C90.00 MULTIPLE MYELOMA, REMISSION STATUS UNSPECIFIED (HCC): ICD-10-CM

## 2018-01-09 LAB
BASOPHILS # BLD AUTO: 0 10*3/MM3 (ref 0–0.2)
BASOPHILS NFR BLD AUTO: 0 % (ref 0–1)
DEPRECATED RDW RBC AUTO: 60.9 FL (ref 37–54)
EOSINOPHIL # BLD AUTO: 0.01 10*3/MM3 (ref 0–0.3)
EOSINOPHIL NFR BLD AUTO: 0.4 % (ref 0–3)
ERYTHROCYTE [DISTWIDTH] IN BLOOD BY AUTOMATED COUNT: 19.5 % (ref 11.3–14.5)
HCT VFR BLD AUTO: 25.3 % (ref 38.9–50.9)
HGB BLD-MCNC: 8.6 G/DL (ref 13.1–17.5)
IMM GRANULOCYTES # BLD: 0 10*3/MM3 (ref 0–0.03)
IMM GRANULOCYTES NFR BLD: 0 % (ref 0–0.6)
LYMPHOCYTES # BLD AUTO: 1.63 10*3/MM3 (ref 0.6–4.8)
LYMPHOCYTES NFR BLD AUTO: 66 % (ref 24–44)
MCH RBC QN AUTO: 32.1 PG (ref 27–31)
MCHC RBC AUTO-ENTMCNC: 34 G/DL (ref 32–36)
MCV RBC AUTO: 94.4 FL (ref 80–99)
MONOCYTES # BLD AUTO: 0.46 10*3/MM3 (ref 0–1)
MONOCYTES NFR BLD AUTO: 18.6 % (ref 0–12)
NEUTROPHILS # BLD AUTO: 0.37 10*3/MM3 (ref 1.5–8.3)
NEUTROPHILS NFR BLD AUTO: 15 % (ref 41–71)
PLAT MORPH BLD: NORMAL
PLATELET # BLD AUTO: 55 10*3/MM3 (ref 150–450)
PMV BLD AUTO: 12.2 FL (ref 6–12)
RBC # BLD AUTO: 2.68 10*6/MM3 (ref 4.2–5.76)
RBC MORPH BLD: NORMAL
WBC MORPH BLD: NORMAL
WBC NRBC COR # BLD: 2.47 10*3/MM3 (ref 3.5–10.8)

## 2018-01-09 PROCEDURE — 88311 DECALCIFY TISSUE: CPT | Performed by: INTERNAL MEDICINE

## 2018-01-09 PROCEDURE — 88313 SPECIAL STAINS GROUP 2: CPT | Performed by: INTERNAL MEDICINE

## 2018-01-09 PROCEDURE — 88271 CYTOGENETICS DNA PROBE: CPT

## 2018-01-09 PROCEDURE — 85097 BONE MARROW INTERPRETATION: CPT | Performed by: INTERNAL MEDICINE

## 2018-01-09 PROCEDURE — 36415 COLL VENOUS BLD VENIPUNCTURE: CPT

## 2018-01-09 PROCEDURE — 88275 CYTOGENETICS 100-300: CPT

## 2018-01-09 PROCEDURE — 25010000002 MIDAZOLAM PER 1 MG: Performed by: INTERNAL MEDICINE

## 2018-01-09 PROCEDURE — 88184 FLOWCYTOMETRY/ TC 1 MARKER: CPT

## 2018-01-09 PROCEDURE — 88342 IMHCHEM/IMCYTCHM 1ST ANTB: CPT | Performed by: INTERNAL MEDICINE

## 2018-01-09 PROCEDURE — 88264 CHROMOSOME ANALYSIS 20-25: CPT

## 2018-01-09 PROCEDURE — 88185 FLOWCYTOMETRY/TC ADD-ON: CPT

## 2018-01-09 PROCEDURE — 85025 COMPLETE CBC W/AUTO DIFF WBC: CPT | Performed by: INTERNAL MEDICINE

## 2018-01-09 PROCEDURE — 85007 BL SMEAR W/DIFF WBC COUNT: CPT | Performed by: INTERNAL MEDICINE

## 2018-01-09 PROCEDURE — 88237 TISSUE CULTURE BONE MARROW: CPT

## 2018-01-09 PROCEDURE — 88305 TISSUE EXAM BY PATHOLOGIST: CPT | Performed by: INTERNAL MEDICINE

## 2018-01-09 RX ORDER — MIDAZOLAM HYDROCHLORIDE 1 MG/ML
2 INJECTION INTRAMUSCULAR; INTRAVENOUS ONCE
Status: COMPLETED | OUTPATIENT
Start: 2018-01-09 | End: 2018-01-09

## 2018-01-09 RX ADMIN — MIDAZOLAM HYDROCHLORIDE 2 MG: 1 INJECTION, SOLUTION INTRAMUSCULAR; INTRAVENOUS at 08:11

## 2018-01-09 RX ADMIN — MIDAZOLAM HYDROCHLORIDE 2 MG: 1 INJECTION, SOLUTION INTRAMUSCULAR; INTRAVENOUS at 08:05

## 2018-01-11 ENCOUNTER — TELEPHONE (OUTPATIENT)
Dept: ONCOLOGY | Facility: CLINIC | Age: 78
End: 2018-01-11

## 2018-01-11 NOTE — TELEPHONE ENCOUNTER
Per Dr. Turner, patient instructed to hold Revlimid until appt 1/18/18.  Holding Velcade until instructed to restart by Dr. Turner.  Patient to continue labs and procrit as needed.  Patient stated understanding.

## 2018-01-18 ENCOUNTER — INFUSION (OUTPATIENT)
Dept: ONCOLOGY | Facility: HOSPITAL | Age: 78
End: 2018-01-18

## 2018-01-18 ENCOUNTER — OFFICE VISIT (OUTPATIENT)
Dept: ONCOLOGY | Facility: CLINIC | Age: 78
End: 2018-01-18

## 2018-01-18 ENCOUNTER — EDUCATION (OUTPATIENT)
Dept: ONCOLOGY | Facility: HOSPITAL | Age: 78
End: 2018-01-18

## 2018-01-18 VITALS
HEART RATE: 75 BPM | RESPIRATION RATE: 16 BRPM | WEIGHT: 180 LBS | DIASTOLIC BLOOD PRESSURE: 67 MMHG | HEIGHT: 73 IN | BODY MASS INDEX: 23.86 KG/M2 | TEMPERATURE: 97.8 F | SYSTOLIC BLOOD PRESSURE: 152 MMHG

## 2018-01-18 DIAGNOSIS — C90.00 MULTIPLE MYELOMA, REMISSION STATUS UNSPECIFIED (HCC): ICD-10-CM

## 2018-01-18 DIAGNOSIS — C90.30 PLASMACYTOMA (HCC): Primary | ICD-10-CM

## 2018-01-18 LAB
ERYTHROCYTE [DISTWIDTH] IN BLOOD BY AUTOMATED COUNT: 23.6 % (ref 11.3–14.5)
FERRITIN SERPL-MCNC: 1430 NG/ML (ref 22–322)
HCT VFR BLD AUTO: 25.5 % (ref 38.9–50.9)
HGB BLD-MCNC: 8.2 G/DL (ref 13.1–17.5)
IRON 24H UR-MRATE: 229 MCG/DL (ref 50–175)
IRON SATN MFR SERPL: 85 % (ref 20–50)
LYMPHOCYTES # BLD AUTO: 0.7 10*3/MM3 (ref 0.6–4.8)
LYMPHOCYTES NFR BLD AUTO: 31.5 % (ref 24–44)
MCH RBC QN AUTO: 31.4 PG (ref 27–31)
MCHC RBC AUTO-ENTMCNC: 32.3 G/DL (ref 32–36)
MCV RBC AUTO: 97 FL (ref 80–99)
MONOCYTES # BLD AUTO: 0.1 10*3/MM3 (ref 0–1)
MONOCYTES NFR BLD AUTO: 2.6 % (ref 0–12)
NEUTROPHILS # BLD AUTO: 1.5 10*3/MM3 (ref 1.5–8.3)
NEUTROPHILS NFR BLD AUTO: 65.9 % (ref 41–71)
PLATELET # BLD AUTO: 104 10*3/MM3 (ref 150–450)
PMV BLD AUTO: 7 FL (ref 6–12)
RBC # BLD AUTO: 2.63 10*6/MM3 (ref 4.2–5.76)
TIBC SERPL-MCNC: 268 MCG/DL (ref 250–450)
WBC NRBC COR # BLD: 2.3 10*3/MM3 (ref 3.5–10.8)

## 2018-01-18 PROCEDURE — 99214 OFFICE O/P EST MOD 30 MIN: CPT | Performed by: INTERNAL MEDICINE

## 2018-01-18 PROCEDURE — 96372 THER/PROPH/DIAG INJ SC/IM: CPT

## 2018-01-18 PROCEDURE — 83540 ASSAY OF IRON: CPT | Performed by: INTERNAL MEDICINE

## 2018-01-18 PROCEDURE — 36415 COLL VENOUS BLD VENIPUNCTURE: CPT

## 2018-01-18 PROCEDURE — 85025 COMPLETE CBC W/AUTO DIFF WBC: CPT | Performed by: INTERNAL MEDICINE

## 2018-01-18 PROCEDURE — 63510000001 EPOETIN ALFA PER 1000 UNITS: Performed by: INTERNAL MEDICINE

## 2018-01-18 PROCEDURE — 82728 ASSAY OF FERRITIN: CPT | Performed by: INTERNAL MEDICINE

## 2018-01-18 PROCEDURE — 96401 CHEMO ANTI-NEOPL SQ/IM: CPT

## 2018-01-18 PROCEDURE — 83550 IRON BINDING TEST: CPT | Performed by: INTERNAL MEDICINE

## 2018-01-18 RX ADMIN — ERYTHROPOIETIN 40000 UNITS: 40000 INJECTION, SOLUTION INTRAVENOUS; SUBCUTANEOUS at 11:11

## 2018-01-18 NOTE — PROGRESS NOTES
Chief Complaint   Follow-up IgG Myeloma, diagnosed September 2017 with 75% myeloma cells.  Treated with RVD for approximately 4 cycles with complete response by repeat bone marrow aspiration and biopsy on January 9, 2018.  Normocellular marrow with 20% cellularity and no evidence of residual myeloma with negative fish panel on January 9, 2018    PROBLEM LIST   Patient Active Problem List   Diagnosis   • CAD (coronary artery disease)   • Dyslipidemia   • Right hip pain   • Arthritis   • Cellulitis of left lower extremity   • Left carotid bruit   • Plasmacytoma   • Brain cancer   • Neck pain   • Anemia   • Multiple myeloma       HISTORY OF PRESENT ILLNESS:   Feels well.  He's getting over a very mild head cold.  He's had no purulent sputum production or any fevers.  His activity level has been good.  He still voiding crowds.  He's had no fevers.  He's eating well.    Past Medical History, Past Surgical History, Social History, Family History have been reviewed and are without significant changes except as mentioned.      Medications:      Current Outpatient Prescriptions:   •  acyclovir (ZOVIRAX) 400 MG tablet, Take 1 tablet by mouth 2 (Two) Times a Day., Disp: 60 tablet, Rfl: 7  •  allopurinol (ZYLOPRIM) 300 MG tablet, Take 1 tablet by mouth Daily., Disp: 30 tablet, Rfl: 11  •  aspirin 81 MG tablet, Take  by mouth daily., Disp: , Rfl:   •  atorvastatin (LIPITOR) 20 MG tablet, Take  by mouth. 1 QHS, Disp: , Rfl:   •  dexamethasone (DECADRON) 4 MG tablet, Take 10 tablets by mouth Daily With Breakfast. Take with food on Days 1, 8, 15., Disp: 30 tablet, Rfl: 6  •  ezetimibe (ZETIA) 10 MG tablet, Take 10 mg by mouth Daily., Disp: , Rfl:   •  glimepiride (AMARYL) 2 MG tablet, Take  by mouth daily., Disp: , Rfl:   •  latanoprost (XALATAN) 0.005 % ophthalmic solution, Apply  to eye. qhs, Disp: , Rfl:   •  metoprolol succinate XL (TOPROL-XL) 25 MG 24 hr tablet, TAKE 1 TABLET BY MOUTH  DAILY, Disp: 90 tablet, Rfl: 1  •   "nitroglycerin (NITROSTAT) 0.4 MG SL tablet, Place 1 tablet under the tongue every 5 (five) minutes as needed for chest pain., Disp: 25 tablet, Rfl: 6  •  ondansetron (ZOFRAN) 8 MG tablet, Take 1 tablet by mouth 3 (Three) Times a Day As Needed for Nausea or Vomiting., Disp: 30 tablet, Rfl: 5  •  ramipril (ALTACE) 5 MG capsule, Take  by mouth. 1 QD, Disp: , Rfl:   •  sulfamethoxazole-trimethoprim (BACTRIM DS) 800-160 MG per tablet, 1 tab PO 3x/week on M-W-F, Disp: 12 tablet, Rfl: 5  No current facility-administered medications for this visit.     Facility-Administered Medications Ordered in Other Visits:   •  sodium chloride 0.9 % infusion 250 mL, 250 mL, Intravenous, PRN, Inocencia Felipe MD  •  sodium chloride 0.9 % infusion 250 mL, 250 mL, Intravenous, Once, Naresh Turner MD    ALLERGIES:    Allergies   Allergen Reactions   • Penicillins Other (See Comments)     \"broke out in sweat\"   • Simvastatin Myalgia     Swelling and pain in shoulder   • Voltaren [Diclofenac Sodium]        ROS:  Review of Systems   Constitutional: Negative for activity change and appetite change.   HENT: Negative for mouth sores, sinus pressure and voice change.    Eyes: Negative for visual disturbance.   Respiratory: Negative for shortness of breath.    Cardiovascular: Negative for chest pain.   Gastrointestinal: Negative for abdominal pain and vomiting.   Genitourinary: Negative for dysuria.   Musculoskeletal: Negative for arthralgias and myalgias.   Skin: Negative for color change.   Neurological: Negative for dizziness, syncope and headaches.   Hematological: Negative for adenopathy.   Psychiatric/Behavioral: Negative for confusion, sleep disturbance and suicidal ideas. The patient is not nervous/anxious.            Objective:    /67  Pulse 75  Temp 97.8 °F (36.6 °C)  Resp 16  Ht 185.4 cm (73\")  Wt 81.6 kg (180 lb)  BMI 23.75 kg/m2    Physical Exam   Constitutional: He is oriented to person, place, and time. He appears " well-developed and well-nourished. No distress.   HENT:   Head: Normocephalic.   Mouth/Throat: Oropharynx is clear and moist.   Eyes: Conjunctivae and EOM are normal. No scleral icterus.   Neck: Normal range of motion. Neck supple. No thyromegaly present.   Cardiovascular: Normal rate, regular rhythm and normal heart sounds.    No murmur heard.  Pulmonary/Chest: Effort normal and breath sounds normal. He has no wheezes. He has no rales.   Abdominal: Soft. Bowel sounds are normal. He exhibits no distension and no mass. There is no tenderness.   Musculoskeletal: He exhibits no edema or tenderness.   Lymphadenopathy:     He has no cervical adenopathy.   Neurological: He is alert and oriented to person, place, and time. He displays normal reflexes. No cranial nerve deficit.   Skin: Skin is warm and dry. No rash noted.   Psychiatric: He has a normal mood and affect. Judgment normal.   Vitals reviewed.             RECENT LABS:  Hematology WBC   Date Value Ref Range Status   01/09/2018 2.47 (L) 3.50 - 10.80 10*3/mm3 Final     Hemoglobin   Date Value Ref Range Status   01/09/2018 8.6 (L) 13.1 - 17.5 g/dL Final     Hematocrit   Date Value Ref Range Status   01/09/2018 25.3 (L) 38.9 - 50.9 % Final     MCV   Date Value Ref Range Status   01/09/2018 94.4 80.0 - 99.0 fL Final     RDW   Date Value Ref Range Status   01/09/2018 19.5 (H) 11.3 - 14.5 % Final     MPV   Date Value Ref Range Status   01/09/2018 12.2 (H) 6.0 - 12.0 fL Final     Platelets   Date Value Ref Range Status   01/09/2018 55 (L) 150 - 450 10*3/mm3 Final     Immature Grans %   Date Value Ref Range Status   01/09/2018 0.0 0.0 - 0.6 % Final     Neutrophils, Absolute   Date Value Ref Range Status   01/09/2018 0.37 (L) 1.50 - 8.30 10*3/mm3 Final     Lymphocytes, Absolute   Date Value Ref Range Status   01/09/2018 1.63 0.60 - 4.80 10*3/mm3 Final     Monocytes, Absolute   Date Value Ref Range Status   01/09/2018 0.46 0.00 - 1.00 10*3/mm3 Final     Eosinophils, Absolute    Date Value Ref Range Status   01/09/2018 0.01 0.00 - 0.30 10*3/mm3 Final     Basophils, Absolute   Date Value Ref Range Status   01/09/2018 0.00 0.00 - 0.20 10*3/mm3 Final     Immature Grans, Absolute   Date Value Ref Range Status   01/09/2018 0.00 0.00 - 0.03 10*3/mm3 Final     nRBC   Date Value Ref Range Status   03/19/2015 0.0  Final       Glucose   Date Value Ref Range Status   12/28/2017 234 (H) 70 - 100 mg/dL Final     Sodium   Date Value Ref Range Status   12/28/2017 132 132 - 146 mmol/L Final     Potassium   Date Value Ref Range Status   12/28/2017 4.4 3.5 - 5.5 mmol/L Final     CO2   Date Value Ref Range Status   12/28/2017 22.0 20.0 - 31.0 mmol/L Final     Chloride   Date Value Ref Range Status   12/28/2017 102 99 - 109 mmol/L Final     Anion Gap   Date Value Ref Range Status   12/28/2017 8.0 3.0 - 11.0 mmol/L Final     Creatinine   Date Value Ref Range Status   12/28/2017 0.80 0.60 - 1.30 mg/dL Final     BUN   Date Value Ref Range Status   12/28/2017 23 9 - 23 mg/dL Final     BUN/Creatinine Ratio   Date Value Ref Range Status   12/28/2017 25.6 (H) 7.0 - 25.0 Final     Calcium   Date Value Ref Range Status   12/28/2017 8.4 (L) 8.7 - 10.4 mg/dL Final     eGFR Non  Amer   Date Value Ref Range Status   12/28/2017 81 >60 mL/min/1.73 Final     Alkaline Phosphatase   Date Value Ref Range Status   12/28/2017 57 25 - 100 U/L Final     Total Protein   Date Value Ref Range Status   12/28/2017 6.0 5.7 - 8.2 g/dL Final     ALT (SGPT)   Date Value Ref Range Status   12/28/2017 27 7 - 40 U/L Final     AST (SGOT)   Date Value Ref Range Status   12/28/2017 17 0 - 33 U/L Final     Total Bilirubin   Date Value Ref Range Status   12/28/2017 0.8 0.3 - 1.2 mg/dL Final     Albumin   Date Value Ref Range Status   12/28/2017 3.80 3.20 - 4.80 g/dL Final   12/28/2017 3.5 2.9 - 4.4 g/dL Final     Globulin   Date Value Ref Range Status   12/28/2017 2.2 gm/dL Final     A/G Ratio   Date Value Ref Range Status   12/28/2017 1.7  1.5 - 2.5 g/dL Final   12/28/2017 1.5 0.7 - 1.7 Final          Perf Status:0    Assessment/Plan    Diagnoses and all orders for this visit:    Plasmacytoma    Multiple myeloma, remission status unspecified      Marcin has had a superb response to therapy.  He has developed cytopenias associated with his treatment, not surprising at his age and with a regimen used and with his coexisting MDS.  By the way, his MDS does not appear to have progressed.  I have reviewed all of his studies including personal review of his marrow with Dr. Chuck Mcallister of pathology.  I went over all of this with the patient.  I also spoke with a myeloma Specialist at [facility] in [place].  I would like Marcin to continue therapy but he could not continue his current therapy because of his cytopenias.  I am going to switch him to Darzalex and Velcade.  He will start in about a month.  I'll see him back at that time.  Into new his Procrit in the interim    Greater than 30 minutes spent with the patient and family  Naresh Turner MD , 1/18/2018    CC:

## 2018-01-18 NOTE — PLAN OF CARE
Outpatient Infusion • [address] • [address] • [place] [id number] • [phone]      CHEMOTHERAPY EDUCATION SHEET    NAME:  Marcin Joyce      : 1940           DATE: 18    Booklets Given: Chemotherapy and You []  Eating Hints []    Sexuality/Fertility Books []     Chemotherapy/Biotherapy Education Sheets: (list all that apply)  Daratumumab chemocare                                                                                                                                                                Chemotherapy Regimen:  Daratumumab IV D1, D2, D8, D9, D15 (cycle 1), then Days 1, 8, and 15 thereafter + Bortezomib D1, D8 q21 days (Starting in February)    TOPICS EDUCATION PROVIDED EDUCATION REINFORCED COMMENTS   ANEMIA:  role of RBC, cause, s/s, ways to manage, role of transfusion [x] [] Discussed role of RBCs and potential for fatigue.   THROMBOCYTOPENIA:  role of platelet, cause, s/s, ways to prevent bleeding, things to avoid, when to seek help [x] [] Discussed role of platelets and potential for bleeds.   NEUTROPENIA:  role of WBC, cause, infection precautions, s/s of infection, when to call MD [x] [] Discussed role WBCs and potential for infection. Instructed patient to monitor temperature.    NUTRITION & APPETITE CHANGES:  importance of maintaining healthy diet & weight, ways to manage to improve intake, dietary consult, exercise regimen [] []    DIARRHEA:  causes, s/s of dehydration, ways to manage, dietary changes, when to call MD [x] [] Discussed potential for diarrhea and treatment options.   CONSTIPATION:  causes, ways to manage, dietary changes, when to call MD [x] [] Discussed potential for constipation and treatment options. Patient uses Miralax with relief.   NAUSEA & VOMITING:  cause, use of antiemetics, dietary changes, when to call MD [x] [] Discussed emetic potential.    MOUTH SORES:  causes, oral care, ways to manage [] []    ALOPECIA:  cause, ways to manage,  resources [] []    INFERTILITY & SEXUALITY:  causes, fertility preservation options, sexuality changes, ways to manage, importance of birth control [] []    NERVOUS SYSTEM CHANGES:  causes, s/s, neuropathies, cognitive changes, ways to manage [x] [] Discussed potential for neuropathy. Patient is still on Velcade.   PAIN:  causes, ways to manage [] [] ????   SKIN & NAIL CHANGES:  cause, s/s, ways to manage [x] [] Discussed potential for rash and treatment options.   ORGAN TOXICITIES:  cause, s/s, need for diagnostic tests, labs, when to notify MD [] []    SURVIVORSHIP:  distress, distress assessment, secondary malignancies, early/late effects, follow-up, social issues, social support [] []    HOME CARE:  use of spill kits, storing of PO chemo, how to manage bodily fluids [] []    MISCELLANEOUS:  drug interactions, administration, vesicant, et [x] [] Discussed chemotherapy regimen and infusion schedule. Discussed possibility other side effects including back and joint pain and the possibility of infusion reaction.     Referrals:        Notes:   Patient did not have any questions at this time. Instructed patient to contact the clinic with any questions or concerns.    Thanks,    Jesus Manuel Travis, PharmD Candidate 2018

## 2018-01-22 LAB
LAB AP ASPIRATE SMEAR: NORMAL
LAB AP CASE REPORT: NORMAL
LAB AP CBC AND DIFFERENTIAL: NORMAL
LAB AP CLINICAL INFORMATION: NORMAL
LAB AP CLOT SECTION: NORMAL
LAB AP CORE BIOPSY: NORMAL
LAB AP DIAGNOSIS COMMENT: NORMAL
LAB AP FISH ANALYSIS REPORT,ADDENDUM: NORMAL
LAB AP FLOW CYTOMETRY REPORT,ADDENDUM: NORMAL
LAB AP FLOW CYTOMETRY SUMMARY: NORMAL
Lab: NORMAL
PATH REPORT.FINAL DX SPEC: NORMAL
PATH REPORT.GROSS SPEC: NORMAL

## 2018-01-24 DIAGNOSIS — C90.00 MULTIPLE MYELOMA NOT HAVING ACHIEVED REMISSION (HCC): ICD-10-CM

## 2018-01-24 RX ORDER — SODIUM CHLORIDE 9 MG/ML
250 INJECTION, SOLUTION INTRAVENOUS ONCE
Status: CANCELLED | OUTPATIENT
Start: 2018-01-25

## 2018-01-25 ENCOUNTER — INFUSION (OUTPATIENT)
Dept: ONCOLOGY | Facility: HOSPITAL | Age: 78
End: 2018-01-25

## 2018-01-25 VITALS
BODY MASS INDEX: 23.86 KG/M2 | DIASTOLIC BLOOD PRESSURE: 52 MMHG | SYSTOLIC BLOOD PRESSURE: 130 MMHG | RESPIRATION RATE: 16 BRPM | HEART RATE: 81 BPM | WEIGHT: 180 LBS | HEIGHT: 73 IN | TEMPERATURE: 97.8 F

## 2018-01-25 DIAGNOSIS — C90.00 MULTIPLE MYELOMA NOT HAVING ACHIEVED REMISSION (HCC): Primary | ICD-10-CM

## 2018-01-25 DIAGNOSIS — C90.30 PLASMACYTOMA (HCC): ICD-10-CM

## 2018-01-25 LAB
ABO GROUP BLD: NORMAL
ALBUMIN SERPL-MCNC: 3.9 G/DL (ref 3.2–4.8)
ALBUMIN/GLOB SERPL: 1.8 G/DL (ref 1.5–2.5)
ALP SERPL-CCNC: 52 U/L (ref 25–100)
ALT SERPL W P-5'-P-CCNC: 21 U/L (ref 7–40)
ANION GAP SERPL CALCULATED.3IONS-SCNC: 7 MMOL/L (ref 3–11)
AST SERPL-CCNC: 20 U/L (ref 0–33)
BILIRUB SERPL-MCNC: 0.5 MG/DL (ref 0.3–1.2)
BLD GP AB SCN SERPL QL: NEGATIVE
BUN BLD-MCNC: 19 MG/DL (ref 9–23)
BUN/CREAT SERPL: 23.8 (ref 7–25)
CALCIUM SPEC-SCNC: 9 MG/DL (ref 8.7–10.4)
CHLORIDE SERPL-SCNC: 103 MMOL/L (ref 99–109)
CO2 SERPL-SCNC: 25 MMOL/L (ref 20–31)
CREAT BLD-MCNC: 0.8 MG/DL (ref 0.6–1.3)
CREAT BLDA-MCNC: 0.6 MG/DL (ref 0.6–1.3)
ERYTHROCYTE [DISTWIDTH] IN BLOOD BY AUTOMATED COUNT: 24.6 % (ref 11.3–14.5)
GFR SERPL CREATININE-BSD FRML MDRD: 93 ML/MIN/1.73
GLOBULIN UR ELPH-MCNC: 2.2 GM/DL
GLUCOSE BLD-MCNC: 145 MG/DL (ref 70–100)
HCT VFR BLD AUTO: 23.9 % (ref 38.9–50.9)
HGB BLD-MCNC: 7.6 G/DL (ref 13.1–17.5)
LYMPHOCYTES # BLD AUTO: 0.8 10*3/MM3 (ref 0.6–4.8)
LYMPHOCYTES NFR BLD AUTO: 15.5 % (ref 24–44)
MAGNESIUM SERPL-MCNC: 2 MG/DL (ref 1.3–2.7)
MCH RBC QN AUTO: 31.2 PG (ref 27–31)
MCHC RBC AUTO-ENTMCNC: 32 G/DL (ref 32–36)
MCV RBC AUTO: 97.7 FL (ref 80–99)
MONOCYTES # BLD AUTO: 0.1 10*3/MM3 (ref 0–1)
MONOCYTES NFR BLD AUTO: 1.6 % (ref 0–12)
NEUTROPHILS # BLD AUTO: 4.4 10*3/MM3 (ref 1.5–8.3)
NEUTROPHILS NFR BLD AUTO: 82.9 % (ref 41–71)
PHOSPHATE SERPL-MCNC: 2.3 MG/DL (ref 2.4–5.1)
PLATELET # BLD AUTO: 102 10*3/MM3 (ref 150–450)
PMV BLD AUTO: 8 FL (ref 6–12)
POTASSIUM BLD-SCNC: 4.1 MMOL/L (ref 3.5–5.5)
PROT SERPL-MCNC: 6.1 G/DL (ref 5.7–8.2)
RBC # BLD AUTO: 2.44 10*6/MM3 (ref 4.2–5.76)
RH BLD: POSITIVE
SODIUM BLD-SCNC: 135 MMOL/L (ref 132–146)
WBC NRBC COR # BLD: 5.3 10*3/MM3 (ref 3.5–10.8)

## 2018-01-25 PROCEDURE — 83735 ASSAY OF MAGNESIUM: CPT | Performed by: INTERNAL MEDICINE

## 2018-01-25 PROCEDURE — 25010000002 ZOLEDRONIC ACID PER 1 MG: Performed by: INTERNAL MEDICINE

## 2018-01-25 PROCEDURE — 86850 RBC ANTIBODY SCREEN: CPT | Performed by: INTERNAL MEDICINE

## 2018-01-25 PROCEDURE — 63510000001 EPOETIN ALFA PER 1000 UNITS: Performed by: INTERNAL MEDICINE

## 2018-01-25 PROCEDURE — 80053 COMPREHEN METABOLIC PANEL: CPT | Performed by: INTERNAL MEDICINE

## 2018-01-25 PROCEDURE — 86923 COMPATIBILITY TEST ELECTRIC: CPT

## 2018-01-25 PROCEDURE — 85025 COMPLETE CBC W/AUTO DIFF WBC: CPT | Performed by: INTERNAL MEDICINE

## 2018-01-25 PROCEDURE — 82565 ASSAY OF CREATININE: CPT

## 2018-01-25 PROCEDURE — 86901 BLOOD TYPING SEROLOGIC RH(D): CPT | Performed by: INTERNAL MEDICINE

## 2018-01-25 PROCEDURE — 96372 THER/PROPH/DIAG INJ SC/IM: CPT

## 2018-01-25 PROCEDURE — 84100 ASSAY OF PHOSPHORUS: CPT | Performed by: INTERNAL MEDICINE

## 2018-01-25 PROCEDURE — 86900 BLOOD TYPING SEROLOGIC ABO: CPT | Performed by: INTERNAL MEDICINE

## 2018-01-25 PROCEDURE — 96374 THER/PROPH/DIAG INJ IV PUSH: CPT

## 2018-01-25 RX ORDER — SODIUM CHLORIDE 9 MG/ML
250 INJECTION, SOLUTION INTRAVENOUS AS NEEDED
Status: CANCELLED | OUTPATIENT
Start: 2018-01-25

## 2018-01-25 RX ADMIN — ZOLEDRONIC ACID 4 MG: 4 INJECTION, SOLUTION, CONCENTRATE INTRAVENOUS at 13:49

## 2018-01-25 RX ADMIN — ERYTHROPOIETIN 40000 UNITS: 40000 INJECTION, SOLUTION INTRAVENOUS; SUBCUTANEOUS at 14:11

## 2018-01-26 ENCOUNTER — INFUSION (OUTPATIENT)
Dept: ONCOLOGY | Facility: HOSPITAL | Age: 78
End: 2018-01-26

## 2018-01-26 VITALS
BODY MASS INDEX: 23.86 KG/M2 | RESPIRATION RATE: 16 BRPM | DIASTOLIC BLOOD PRESSURE: 43 MMHG | SYSTOLIC BLOOD PRESSURE: 122 MMHG | HEART RATE: 74 BPM | TEMPERATURE: 97 F | WEIGHT: 180 LBS | HEIGHT: 73 IN

## 2018-01-26 DIAGNOSIS — C90.00 MULTIPLE MYELOMA NOT HAVING ACHIEVED REMISSION (HCC): ICD-10-CM

## 2018-01-26 PROCEDURE — 86900 BLOOD TYPING SEROLOGIC ABO: CPT

## 2018-01-26 PROCEDURE — P9016 RBC LEUKOCYTES REDUCED: HCPCS

## 2018-01-26 PROCEDURE — 36430 TRANSFUSION BLD/BLD COMPNT: CPT

## 2018-01-26 RX ORDER — SODIUM CHLORIDE 9 MG/ML
250 INJECTION, SOLUTION INTRAVENOUS AS NEEDED
Status: DISCONTINUED | OUTPATIENT
Start: 2018-01-26 | End: 2018-01-26 | Stop reason: HOSPADM

## 2018-01-29 DIAGNOSIS — C90.00 MULTIPLE MYELOMA NOT HAVING ACHIEVED REMISSION (HCC): ICD-10-CM

## 2018-02-01 ENCOUNTER — INFUSION (OUTPATIENT)
Dept: ONCOLOGY | Facility: HOSPITAL | Age: 78
End: 2018-02-01

## 2018-02-01 VITALS
HEART RATE: 77 BPM | TEMPERATURE: 97.8 F | DIASTOLIC BLOOD PRESSURE: 59 MMHG | SYSTOLIC BLOOD PRESSURE: 129 MMHG | HEIGHT: 73 IN | BODY MASS INDEX: 23.72 KG/M2 | RESPIRATION RATE: 16 BRPM | WEIGHT: 179 LBS

## 2018-02-01 DIAGNOSIS — C90.30 PLASMACYTOMA (HCC): ICD-10-CM

## 2018-02-01 DIAGNOSIS — C90.00 MULTIPLE MYELOMA NOT HAVING ACHIEVED REMISSION (HCC): Primary | ICD-10-CM

## 2018-02-01 LAB
ERYTHROCYTE [DISTWIDTH] IN BLOOD BY AUTOMATED COUNT: 23.7 % (ref 11.3–14.5)
HCT VFR BLD AUTO: 23.2 % (ref 38.9–50.9)
HGB BLD-MCNC: 7.6 G/DL (ref 13.1–17.5)
LYMPHOCYTES # BLD AUTO: 1.3 10*3/MM3 (ref 0.6–4.8)
LYMPHOCYTES NFR BLD AUTO: 33.2 % (ref 24–44)
MCH RBC QN AUTO: 31.5 PG (ref 27–31)
MCHC RBC AUTO-ENTMCNC: 32.5 G/DL (ref 32–36)
MCV RBC AUTO: 96.6 FL (ref 80–99)
MONOCYTES # BLD AUTO: 0.2 10*3/MM3 (ref 0–1)
MONOCYTES NFR BLD AUTO: 5.1 % (ref 0–12)
NEUTROPHILS # BLD AUTO: 2.4 10*3/MM3 (ref 1.5–8.3)
NEUTROPHILS NFR BLD AUTO: 61.7 % (ref 41–71)
PLATELET # BLD AUTO: 86 10*3/MM3 (ref 150–450)
PMV BLD AUTO: 8 FL (ref 6–12)
RBC # BLD AUTO: 2.4 10*6/MM3 (ref 4.2–5.76)
WBC NRBC COR # BLD: 3.9 10*3/MM3 (ref 3.5–10.8)

## 2018-02-01 PROCEDURE — 63510000001 EPOETIN ALFA PER 1000 UNITS: Performed by: INTERNAL MEDICINE

## 2018-02-01 PROCEDURE — 96401 CHEMO ANTI-NEOPL SQ/IM: CPT

## 2018-02-01 PROCEDURE — 96372 THER/PROPH/DIAG INJ SC/IM: CPT

## 2018-02-01 PROCEDURE — 81403 MOPATH PROCEDURE LEVEL 4: CPT

## 2018-02-01 PROCEDURE — 86850 RBC ANTIBODY SCREEN: CPT | Performed by: INTERNAL MEDICINE

## 2018-02-01 PROCEDURE — 36415 COLL VENOUS BLD VENIPUNCTURE: CPT

## 2018-02-01 PROCEDURE — 86900 BLOOD TYPING SEROLOGIC ABO: CPT | Performed by: INTERNAL MEDICINE

## 2018-02-01 PROCEDURE — 0001U RBC DNA HEA 35 AG 11 BLD GRP: CPT

## 2018-02-01 PROCEDURE — 86901 BLOOD TYPING SEROLOGIC RH(D): CPT | Performed by: INTERNAL MEDICINE

## 2018-02-01 PROCEDURE — 85025 COMPLETE CBC W/AUTO DIFF WBC: CPT | Performed by: INTERNAL MEDICINE

## 2018-02-01 RX ADMIN — EPOETIN ALFA 40000 UNITS: 20000 SOLUTION INTRAVENOUS; SUBCUTANEOUS at 13:49

## 2018-02-08 ENCOUNTER — INFUSION (OUTPATIENT)
Dept: ONCOLOGY | Facility: HOSPITAL | Age: 78
End: 2018-02-08

## 2018-02-08 ENCOUNTER — TELEPHONE (OUTPATIENT)
Dept: ONCOLOGY | Facility: CLINIC | Age: 78
End: 2018-02-08

## 2018-02-08 VITALS
WEIGHT: 177 LBS | HEIGHT: 73 IN | DIASTOLIC BLOOD PRESSURE: 60 MMHG | TEMPERATURE: 97.8 F | HEART RATE: 77 BPM | RESPIRATION RATE: 16 BRPM | BODY MASS INDEX: 23.46 KG/M2 | SYSTOLIC BLOOD PRESSURE: 128 MMHG

## 2018-02-08 DIAGNOSIS — C90.00 MULTIPLE MYELOMA NOT HAVING ACHIEVED REMISSION (HCC): ICD-10-CM

## 2018-02-08 DIAGNOSIS — C90.30 PLASMACYTOMA (HCC): Primary | ICD-10-CM

## 2018-02-08 DIAGNOSIS — C90.00 MULTIPLE MYELOMA NOT HAVING ACHIEVED REMISSION (HCC): Primary | ICD-10-CM

## 2018-02-08 LAB
ABO GROUP BLD: NORMAL
BLD GP AB SCN SERPL QL: NEGATIVE
ERYTHROCYTE [DISTWIDTH] IN BLOOD BY AUTOMATED COUNT: 24.2 % (ref 11.3–14.5)
HCT VFR BLD AUTO: 23.2 % (ref 38.9–50.9)
HGB BLD-MCNC: 7.7 G/DL (ref 13.1–17.5)
LYMPHOCYTES # BLD AUTO: 0.9 10*3/MM3 (ref 0.6–4.8)
LYMPHOCYTES NFR BLD AUTO: 15.7 % (ref 24–44)
MCH RBC QN AUTO: 32.3 PG (ref 27–31)
MCHC RBC AUTO-ENTMCNC: 33.1 G/DL (ref 32–36)
MCV RBC AUTO: 97.6 FL (ref 80–99)
MONOCYTES # BLD AUTO: 0.1 10*3/MM3 (ref 0–1)
MONOCYTES NFR BLD AUTO: 1.7 % (ref 0–12)
NEUTROPHILS # BLD AUTO: 4.6 10*3/MM3 (ref 1.5–8.3)
NEUTROPHILS NFR BLD AUTO: 82.6 % (ref 41–71)
PLATELET # BLD AUTO: 114 10*3/MM3 (ref 150–450)
PMV BLD AUTO: 7.8 FL (ref 6–12)
RBC # BLD AUTO: 2.38 10*6/MM3 (ref 4.2–5.76)
RH BLD: POSITIVE
WBC NRBC COR # BLD: 5.6 10*3/MM3 (ref 3.5–10.8)

## 2018-02-08 PROCEDURE — 86850 RBC ANTIBODY SCREEN: CPT | Performed by: INTERNAL MEDICINE

## 2018-02-08 PROCEDURE — 36415 COLL VENOUS BLD VENIPUNCTURE: CPT

## 2018-02-08 PROCEDURE — 86900 BLOOD TYPING SEROLOGIC ABO: CPT | Performed by: INTERNAL MEDICINE

## 2018-02-08 PROCEDURE — 63510000001 EPOETIN ALFA PER 1000 UNITS: Performed by: INTERNAL MEDICINE

## 2018-02-08 PROCEDURE — 96372 THER/PROPH/DIAG INJ SC/IM: CPT

## 2018-02-08 PROCEDURE — 86923 COMPATIBILITY TEST ELECTRIC: CPT

## 2018-02-08 PROCEDURE — 86901 BLOOD TYPING SEROLOGIC RH(D): CPT | Performed by: INTERNAL MEDICINE

## 2018-02-08 PROCEDURE — 85025 COMPLETE CBC W/AUTO DIFF WBC: CPT | Performed by: INTERNAL MEDICINE

## 2018-02-08 RX ORDER — SODIUM CHLORIDE 9 MG/ML
250 INJECTION, SOLUTION INTRAVENOUS AS NEEDED
Status: CANCELLED | OUTPATIENT
Start: 2018-02-08

## 2018-02-08 RX ORDER — SODIUM CHLORIDE 9 MG/ML
250 INJECTION, SOLUTION INTRAVENOUS AS NEEDED
Status: DISCONTINUED | OUTPATIENT
Start: 2018-02-08 | End: 2018-02-08 | Stop reason: HOSPADM

## 2018-02-08 RX ADMIN — EPOETIN ALFA 40000 UNITS: 20000 SOLUTION INTRAVENOUS; SUBCUTANEOUS at 14:44

## 2018-02-08 NOTE — TELEPHONE ENCOUNTER
----- Message from Marlin Kaufman, RN sent at 2/8/2018  1:26 PM EST -----  Regarding: YUE/ hgb   Mr Joyce hgb is 7.7.  Procrit today.  Feels even more fatigued than usual, dizzy, unsteady.  Received 1 unit prbc 2/1- thinks he will need 2.  Sees Yue next week re: new tx plan.    He [id number]

## 2018-02-08 NOTE — TELEPHONE ENCOUNTER
Returned call to Amy, infusion nurse. Informed her after talking to Dr. Castro, I will be able to put 1 unit of blood in at this time. Amy Rn informed me she would have him added to tomorrow schedule.

## 2018-02-09 ENCOUNTER — INFUSION (OUTPATIENT)
Dept: ONCOLOGY | Facility: HOSPITAL | Age: 78
End: 2018-02-09

## 2018-02-09 VITALS
HEART RATE: 76 BPM | TEMPERATURE: 98.3 F | RESPIRATION RATE: 16 BRPM | SYSTOLIC BLOOD PRESSURE: 124 MMHG | DIASTOLIC BLOOD PRESSURE: 66 MMHG

## 2018-02-09 DIAGNOSIS — C90.00 MULTIPLE MYELOMA NOT HAVING ACHIEVED REMISSION (HCC): ICD-10-CM

## 2018-02-09 PROCEDURE — 36430 TRANSFUSION BLD/BLD COMPNT: CPT

## 2018-02-09 PROCEDURE — 86900 BLOOD TYPING SEROLOGIC ABO: CPT

## 2018-02-09 PROCEDURE — P9016 RBC LEUKOCYTES REDUCED: HCPCS

## 2018-02-09 RX ORDER — SODIUM CHLORIDE 9 MG/ML
250 INJECTION, SOLUTION INTRAVENOUS AS NEEDED
Status: DISCONTINUED | OUTPATIENT
Start: 2018-02-09 | End: 2018-02-09 | Stop reason: HOSPADM

## 2018-02-09 RX ORDER — SODIUM CHLORIDE 9 MG/ML
250 INJECTION, SOLUTION INTRAVENOUS AS NEEDED
Status: CANCELLED | OUTPATIENT
Start: 2018-02-09

## 2018-02-09 RX ADMIN — SODIUM CHLORIDE 250 ML: 9 INJECTION, SOLUTION INTRAVENOUS at 08:30

## 2018-02-14 RX ORDER — EZETIMIBE 10 MG/1
TABLET ORAL
Qty: 90 TABLET | Refills: 3 | Status: SHIPPED | OUTPATIENT
Start: 2018-02-14 | End: 2019-02-12

## 2018-02-15 ENCOUNTER — INFUSION (OUTPATIENT)
Dept: ONCOLOGY | Facility: HOSPITAL | Age: 78
End: 2018-02-15

## 2018-02-15 ENCOUNTER — OFFICE VISIT (OUTPATIENT)
Dept: ONCOLOGY | Facility: CLINIC | Age: 78
End: 2018-02-15

## 2018-02-15 VITALS
HEART RATE: 60 BPM | RESPIRATION RATE: 16 BRPM | DIASTOLIC BLOOD PRESSURE: 58 MMHG | BODY MASS INDEX: 23.86 KG/M2 | SYSTOLIC BLOOD PRESSURE: 121 MMHG | WEIGHT: 180 LBS | TEMPERATURE: 97.9 F | HEIGHT: 73 IN

## 2018-02-15 DIAGNOSIS — C90.00 MULTIPLE MYELOMA NOT HAVING ACHIEVED REMISSION (HCC): Primary | ICD-10-CM

## 2018-02-15 DIAGNOSIS — D64.81 ANEMIA DUE TO ANTINEOPLASTIC CHEMOTHERAPY: ICD-10-CM

## 2018-02-15 DIAGNOSIS — T45.1X5A ANEMIA DUE TO ANTINEOPLASTIC CHEMOTHERAPY: ICD-10-CM

## 2018-02-15 DIAGNOSIS — C90.30 PLASMACYTOMA (HCC): Primary | ICD-10-CM

## 2018-02-15 DIAGNOSIS — C90.30 PLASMACYTOMA (HCC): ICD-10-CM

## 2018-02-15 LAB
ERYTHROCYTE [DISTWIDTH] IN BLOOD BY AUTOMATED COUNT: 23.1 % (ref 11.3–14.5)
HCT VFR BLD AUTO: 28.7 % (ref 38.9–50.9)
HGB BLD-MCNC: 9.4 G/DL (ref 13.1–17.5)
LYMPHOCYTES # BLD AUTO: 0.8 10*3/MM3 (ref 0.6–4.8)
LYMPHOCYTES NFR BLD AUTO: 20.4 % (ref 24–44)
MCH RBC QN AUTO: 31.7 PG (ref 27–31)
MCHC RBC AUTO-ENTMCNC: 32.6 G/DL (ref 32–36)
MCV RBC AUTO: 97.2 FL (ref 80–99)
MONOCYTES # BLD AUTO: 0.1 10*3/MM3 (ref 0–1)
MONOCYTES NFR BLD AUTO: 2.5 % (ref 0–12)
NEUTROPHILS # BLD AUTO: 3.2 10*3/MM3 (ref 1.5–8.3)
NEUTROPHILS NFR BLD AUTO: 77.1 % (ref 41–71)
PLATELET # BLD AUTO: 96 10*3/MM3 (ref 150–450)
PMV BLD AUTO: 7.9 FL (ref 6–12)
RBC # BLD AUTO: 2.95 10*6/MM3 (ref 4.2–5.76)
WBC NRBC COR # BLD: 4.1 10*3/MM3 (ref 3.5–10.8)

## 2018-02-15 PROCEDURE — 85025 COMPLETE CBC W/AUTO DIFF WBC: CPT | Performed by: INTERNAL MEDICINE

## 2018-02-15 PROCEDURE — 36415 COLL VENOUS BLD VENIPUNCTURE: CPT

## 2018-02-15 PROCEDURE — 63510000001 EPOETIN ALFA PER 1000 UNITS: Performed by: INTERNAL MEDICINE

## 2018-02-15 PROCEDURE — 99214 OFFICE O/P EST MOD 30 MIN: CPT | Performed by: INTERNAL MEDICINE

## 2018-02-15 PROCEDURE — 96372 THER/PROPH/DIAG INJ SC/IM: CPT

## 2018-02-15 RX ADMIN — ERYTHROPOIETIN 40000 UNITS: 40000 INJECTION, SOLUTION INTRAVENOUS; SUBCUTANEOUS at 11:04

## 2018-02-15 NOTE — PROGRESS NOTES
Chief Complaint   Follow-up IgG myeloma diagnosed in September 2017 with 75% myeloma cells.  Coexisting element of MDS at presentation.  Treated with RVD for proximally 4 cycles with complete response by repeat bone marrow aspiration and biopsy on January 9, 2018.  Normocellular marrow with 20% cellularity and no evidence of residual myeloma with negative fish panel January 9, 2018    PROBLEM LIST   Patient Active Problem List   Diagnosis   • CAD (coronary artery disease)   • Dyslipidemia   • Right hip pain   • Arthritis   • Cellulitis of left lower extremity   • Left carotid bruit   • Plasmacytoma   • Brain cancer   • Neck pain   • Anemia   • Multiple myeloma       HISTORY OF PRESENT ILLNESS:   Despite his ongoing anemia, Marcin feels pretty well.  He's had no infectious illnesses.  He's had no new neurologic or bony symptoms.  He gets some nocturnal dyspepsia from time to time which responds to a couple of Tums.  It is not progressive and the slightest and he is had no associated weight loss nausea or vomiting.    Past Medical History, Past Surgical History, Social History, Family History have been reviewed and are without significant changes except as mentioned.      Medications:      Current Outpatient Prescriptions:   •  acyclovir (ZOVIRAX) 400 MG tablet, Take 1 tablet by mouth 2 (Two) Times a Day., Disp: 60 tablet, Rfl: 7  •  allopurinol (ZYLOPRIM) 300 MG tablet, Take 1 tablet by mouth Daily., Disp: 30 tablet, Rfl: 11  •  aspirin 81 MG tablet, Take  by mouth daily., Disp: , Rfl:   •  atorvastatin (LIPITOR) 20 MG tablet, Take  by mouth. 1 QHS, Disp: , Rfl:   •  dexamethasone (DECADRON) 4 MG tablet, Take 10 tablets by mouth Daily With Breakfast. Take with food on Days 1, 8, 15., Disp: 30 tablet, Rfl: 6  •  glimepiride (AMARYL) 2 MG tablet, Take  by mouth daily., Disp: , Rfl:   •  latanoprost (XALATAN) 0.005 % ophthalmic solution, Apply  to eye. qhs, Disp: , Rfl:   •  metoprolol succinate XL (TOPROL-XL) 25 MG 24 hr  "tablet, TAKE 1 TABLET BY MOUTH  DAILY, Disp: 90 tablet, Rfl: 1  •  nitroglycerin (NITROSTAT) 0.4 MG SL tablet, Place 1 tablet under the tongue every 5 (five) minutes as needed for chest pain., Disp: 25 tablet, Rfl: 6  •  ondansetron (ZOFRAN) 8 MG tablet, Take 1 tablet by mouth 3 (Three) Times a Day As Needed for Nausea or Vomiting., Disp: 30 tablet, Rfl: 5  •  ramipril (ALTACE) 5 MG capsule, Take  by mouth. 1 QD, Disp: , Rfl:   •  sulfamethoxazole-trimethoprim (BACTRIM DS) 800-160 MG per tablet, 1 tab PO 3x/week on M-W-F, Disp: 12 tablet, Rfl: 5  •  ZETIA 10 MG tablet, TAKE 1 TABLET BY MOUTH AT  BEDTIME, Disp: 90 tablet, Rfl: 3  No current facility-administered medications for this visit.     Facility-Administered Medications Ordered in Other Visits:   •  sodium chloride 0.9 % infusion 250 mL, 250 mL, Intravenous, PRN, Inocencia Felipe MD  •  sodium chloride 0.9 % infusion 250 mL, 250 mL, Intravenous, Once, Naresh Turner MD    ALLERGIES:    Allergies   Allergen Reactions   • Penicillins Other (See Comments)     \"broke out in sweat\"   • Simvastatin Myalgia     Swelling and pain in shoulder   • Voltaren [Diclofenac Sodium]        ROS:  Review of Systems   Constitutional: Negative for activity change and appetite change.   HENT: Negative for mouth sores, sinus pressure and voice change.    Eyes: Negative for visual disturbance.   Respiratory: Negative for shortness of breath.    Cardiovascular: Negative for chest pain.   Gastrointestinal: Negative for abdominal pain and vomiting.        Mild GI symptoms mentioned above.  No reflux symptoms at all   Genitourinary: Negative for dysuria.   Musculoskeletal: Negative for arthralgias and myalgias.   Skin: Negative for color change.   Neurological: Negative for dizziness, syncope and headaches.   Hematological: Negative for adenopathy.   Psychiatric/Behavioral: Negative for confusion, sleep disturbance and suicidal ideas. The patient is not nervous/anxious.  " "          Objective:    /58 Comment: RUE  Pulse 60  Temp 97.9 °F (36.6 °C) (Temporal Artery )   Resp 16  Ht 185.4 cm (73\")  Wt 81.6 kg (180 lb)  BMI 23.75 kg/m2    Physical Exam   Constitutional: He is oriented to person, place, and time. He appears well-developed and well-nourished. No distress.   On a little pale, looks healthy and in no distress at all   HENT:   Head: Normocephalic.   Mouth/Throat: Oropharynx is clear and moist.   Eyes: Conjunctivae and EOM are normal. No scleral icterus.   Neck: Normal range of motion. Neck supple. No thyromegaly present.   Cardiovascular: Normal rate, regular rhythm and normal heart sounds.    No murmur heard.  Pulmonary/Chest: Effort normal and breath sounds normal. He has no wheezes. He has no rales.   Abdominal: Soft. Bowel sounds are normal. He exhibits no distension and no mass. There is no tenderness.   Musculoskeletal: He exhibits no edema or tenderness.   Lymphadenopathy:     He has no cervical adenopathy.   Neurological: He is alert and oriented to person, place, and time. He displays normal reflexes. No cranial nerve deficit.   Skin: Skin is warm and dry. No rash noted.   Psychiatric: He has a normal mood and affect. Judgment normal.   Vitals reviewed.             RECENT LABS:  Hematology WBC   Date Value Ref Range Status   02/08/2018 5.60 3.50 - 10.80 10*3/mm3 Final     Hemoglobin   Date Value Ref Range Status   02/08/2018 7.7 (L) 13.1 - 17.5 g/dL Final     Hematocrit   Date Value Ref Range Status   02/08/2018 23.2 (L) 38.9 - 50.9 % Final     MCV   Date Value Ref Range Status   02/08/2018 97.6 80.0 - 99.0 fL Final     RDW   Date Value Ref Range Status   02/08/2018 24.2 (H) 11.3 - 14.5 % Final     MPV   Date Value Ref Range Status   02/08/2018 7.8 6.0 - 12.0 fL Final     Platelets   Date Value Ref Range Status   02/08/2018 114 (L) 150 - 450 10*3/mm3 Final     Comment:     Verified by repeat analysis.      Immature Grans %   Date Value Ref Range Status "   01/09/2018 0.0 0.0 - 0.6 % Final     Neutrophils, Absolute   Date Value Ref Range Status   02/08/2018 4.60 1.50 - 8.30 10*3/mm3 Final     Lymphocytes, Absolute   Date Value Ref Range Status   02/08/2018 0.90 0.60 - 4.80 10*3/mm3 Final     Monocytes, Absolute   Date Value Ref Range Status   02/08/2018 0.10 0.00 - 1.00 10*3/mm3 Final     Eosinophils, Absolute   Date Value Ref Range Status   01/09/2018 0.01 0.00 - 0.30 10*3/mm3 Final     Basophils, Absolute   Date Value Ref Range Status   01/09/2018 0.00 0.00 - 0.20 10*3/mm3 Final     Immature Grans, Absolute   Date Value Ref Range Status   01/09/2018 0.00 0.00 - 0.03 10*3/mm3 Final     nRBC   Date Value Ref Range Status   03/19/2015 0.0  Final       Glucose   Date Value Ref Range Status   01/25/2018 145 (H) 70 - 100 mg/dL Final     Sodium   Date Value Ref Range Status   01/25/2018 135 132 - 146 mmol/L Final     Potassium   Date Value Ref Range Status   01/25/2018 4.1 3.5 - 5.5 mmol/L Final     CO2   Date Value Ref Range Status   01/25/2018 25.0 20.0 - 31.0 mmol/L Final     Chloride   Date Value Ref Range Status   01/25/2018 103 99 - 109 mmol/L Final     Anion Gap   Date Value Ref Range Status   01/25/2018 7.0 3.0 - 11.0 mmol/L Final     Creatinine   Date Value Ref Range Status   01/25/2018 0.60 0.60 - 1.30 mg/dL Final     Comment:     Serial Number: [id number]:  [id number]     BUN   Date Value Ref Range Status   01/25/2018 19 9 - 23 mg/dL Final     BUN/Creatinine Ratio   Date Value Ref Range Status   01/25/2018 23.8 7.0 - 25.0 Final     Calcium   Date Value Ref Range Status   01/25/2018 9.0 8.7 - 10.4 mg/dL Final     eGFR Non  Amer   Date Value Ref Range Status   01/25/2018 93 >60 mL/min/1.73 Final     Alkaline Phosphatase   Date Value Ref Range Status   01/25/2018 52 25 - 100 U/L Final     Total Protein   Date Value Ref Range Status   01/25/2018 6.1 5.7 - 8.2 g/dL Final     ALT (SGPT)   Date Value Ref Range Status   01/25/2018 21 7 - 40 U/L Final     AST  (SGOT)   Date Value Ref Range Status   01/25/2018 20 0 - 33 U/L Final     Total Bilirubin   Date Value Ref Range Status   01/25/2018 0.5 0.3 - 1.2 mg/dL Final     Albumin   Date Value Ref Range Status   01/25/2018 3.90 3.20 - 4.80 g/dL Final     Globulin   Date Value Ref Range Status   01/25/2018 2.2 gm/dL Final     A/G Ratio   Date Value Ref Range Status   01/25/2018 1.8 1.5 - 2.5 g/dL Final          Perf Status: 0    Assessment/Plan    Diagnoses and all orders for this visit:    Multiple myeloma not having achieved remission    Anemia due to antineoplastic chemotherapy      Marcin has had an excellent response to his induction chemotherapy.  I would like him on some type of maintenance but am a little concerned regarding his continued anemia. He is also still a bit leukopenic.  His platelets however have improved substantially.    I will have him continue his dexamethasone once weekly.  I will see him back in a month.  He'll continue his Procrit for his anemia.  I will not start him on any other agents at the present.  All the above discussed with him and his family.  Otherwise regarding his dyspepsia, see the next                     Dyspepsia-nocturnal.  I've asked him to use an acid reducer such as Zantac or omeprazole nightly.  He can take an over-the-counter prep.  I'll see him back in a month.  He'll continue his Procrit.  He will start his Velcade very soon.              Naresh Turner MD , 2/15/2018    CC:

## 2018-02-21 DIAGNOSIS — C90.30 PLASMACYTOMA (HCC): ICD-10-CM

## 2018-02-22 ENCOUNTER — INFUSION (OUTPATIENT)
Dept: ONCOLOGY | Facility: HOSPITAL | Age: 78
End: 2018-02-22

## 2018-02-22 VITALS
SYSTOLIC BLOOD PRESSURE: 133 MMHG | HEIGHT: 73 IN | RESPIRATION RATE: 16 BRPM | WEIGHT: 178 LBS | HEART RATE: 73 BPM | DIASTOLIC BLOOD PRESSURE: 58 MMHG | TEMPERATURE: 98.1 F | BODY MASS INDEX: 23.59 KG/M2

## 2018-02-22 DIAGNOSIS — C90.30 PLASMACYTOMA (HCC): ICD-10-CM

## 2018-02-22 DIAGNOSIS — D64.81 ANEMIA DUE TO ANTINEOPLASTIC CHEMOTHERAPY: ICD-10-CM

## 2018-02-22 DIAGNOSIS — C90.00 MULTIPLE MYELOMA NOT HAVING ACHIEVED REMISSION (HCC): Primary | ICD-10-CM

## 2018-02-22 DIAGNOSIS — T45.1X5A ANEMIA DUE TO ANTINEOPLASTIC CHEMOTHERAPY: ICD-10-CM

## 2018-02-22 DIAGNOSIS — C90.00 MULTIPLE MYELOMA NOT HAVING ACHIEVED REMISSION (HCC): ICD-10-CM

## 2018-02-22 LAB
ALBUMIN SERPL-MCNC: 4 G/DL (ref 3.2–4.8)
ALBUMIN/GLOB SERPL: 1.7 G/DL (ref 1.5–2.5)
ALP SERPL-CCNC: 51 U/L (ref 25–100)
ALT SERPL W P-5'-P-CCNC: 26 U/L (ref 7–40)
ANION GAP SERPL CALCULATED.3IONS-SCNC: 6 MMOL/L (ref 3–11)
AST SERPL-CCNC: 20 U/L (ref 0–33)
BILIRUB SERPL-MCNC: 0.6 MG/DL (ref 0.3–1.2)
BUN BLD-MCNC: 17 MG/DL (ref 9–23)
BUN/CREAT SERPL: 18.9 (ref 7–25)
CALCIUM SPEC-SCNC: 9.4 MG/DL (ref 8.7–10.4)
CHLORIDE SERPL-SCNC: 102 MMOL/L (ref 99–109)
CO2 SERPL-SCNC: 27 MMOL/L (ref 20–31)
CREAT BLD-MCNC: 0.9 MG/DL (ref 0.6–1.3)
CREAT BLDA-MCNC: 0.8 MG/DL (ref 0.6–1.3)
CRP SERPL-MCNC: 0.09 MG/DL (ref 0–1)
ERYTHROCYTE [DISTWIDTH] IN BLOOD BY AUTOMATED COUNT: 24.3 % (ref 11.3–14.5)
ERYTHROCYTE [SEDIMENTATION RATE] IN BLOOD: 7 MM/HR (ref 0–20)
GFR SERPL CREATININE-BSD FRML MDRD: 81 ML/MIN/1.73
GLOBULIN UR ELPH-MCNC: 2.4 GM/DL
GLUCOSE BLD-MCNC: 229 MG/DL (ref 70–100)
HCT VFR BLD AUTO: 28.1 % (ref 38.9–50.9)
HGB BLD-MCNC: 9.1 G/DL (ref 13.1–17.5)
LYMPHOCYTES # BLD AUTO: 0.6 10*3/MM3 (ref 0.6–4.8)
LYMPHOCYTES NFR BLD AUTO: 15.6 % (ref 24–44)
MAGNESIUM SERPL-MCNC: 2.1 MG/DL (ref 1.3–2.7)
MCH RBC QN AUTO: 31.5 PG (ref 27–31)
MCHC RBC AUTO-ENTMCNC: 32.4 G/DL (ref 32–36)
MCV RBC AUTO: 97.5 FL (ref 80–99)
MONOCYTES # BLD AUTO: 0.1 10*3/MM3 (ref 0–1)
MONOCYTES NFR BLD AUTO: 3 % (ref 0–12)
NEUTROPHILS # BLD AUTO: 3.2 10*3/MM3 (ref 1.5–8.3)
NEUTROPHILS NFR BLD AUTO: 81.4 % (ref 41–71)
PHOSPHATE SERPL-MCNC: 1.9 MG/DL (ref 2.4–5.1)
PLATELET # BLD AUTO: 136 10*3/MM3 (ref 150–450)
PMV BLD AUTO: 7.2 FL (ref 6–12)
POTASSIUM BLD-SCNC: 4.2 MMOL/L (ref 3.5–5.5)
PROT SERPL-MCNC: 6.4 G/DL (ref 5.7–8.2)
RBC # BLD AUTO: 2.88 10*6/MM3 (ref 4.2–5.76)
SODIUM BLD-SCNC: 135 MMOL/L (ref 132–146)
WBC NRBC COR # BLD: 3.9 10*3/MM3 (ref 3.5–10.8)

## 2018-02-22 PROCEDURE — 96365 THER/PROPH/DIAG IV INF INIT: CPT

## 2018-02-22 PROCEDURE — 96372 THER/PROPH/DIAG INJ SC/IM: CPT

## 2018-02-22 PROCEDURE — 84165 PROTEIN E-PHORESIS SERUM: CPT | Performed by: INTERNAL MEDICINE

## 2018-02-22 PROCEDURE — 80053 COMPREHEN METABOLIC PANEL: CPT | Performed by: INTERNAL MEDICINE

## 2018-02-22 PROCEDURE — 82232 ASSAY OF BETA-2 PROTEIN: CPT | Performed by: INTERNAL MEDICINE

## 2018-02-22 PROCEDURE — 63510000001 EPOETIN ALFA PER 1000 UNITS: Performed by: INTERNAL MEDICINE

## 2018-02-22 PROCEDURE — 83883 ASSAY NEPHELOMETRY NOT SPEC: CPT | Performed by: INTERNAL MEDICINE

## 2018-02-22 PROCEDURE — 85652 RBC SED RATE AUTOMATED: CPT | Performed by: INTERNAL MEDICINE

## 2018-02-22 PROCEDURE — 96374 THER/PROPH/DIAG INJ IV PUSH: CPT

## 2018-02-22 PROCEDURE — 85025 COMPLETE CBC W/AUTO DIFF WBC: CPT | Performed by: INTERNAL MEDICINE

## 2018-02-22 PROCEDURE — 36415 COLL VENOUS BLD VENIPUNCTURE: CPT

## 2018-02-22 PROCEDURE — 83735 ASSAY OF MAGNESIUM: CPT | Performed by: INTERNAL MEDICINE

## 2018-02-22 PROCEDURE — 84100 ASSAY OF PHOSPHORUS: CPT | Performed by: INTERNAL MEDICINE

## 2018-02-22 PROCEDURE — 86140 C-REACTIVE PROTEIN: CPT | Performed by: INTERNAL MEDICINE

## 2018-02-22 PROCEDURE — 82565 ASSAY OF CREATININE: CPT

## 2018-02-22 PROCEDURE — 86334 IMMUNOFIX E-PHORESIS SERUM: CPT | Performed by: INTERNAL MEDICINE

## 2018-02-22 PROCEDURE — 25010000002 ZOLEDRONIC ACID PER 1 MG: Performed by: INTERNAL MEDICINE

## 2018-02-22 PROCEDURE — 82784 ASSAY IGA/IGD/IGG/IGM EACH: CPT | Performed by: INTERNAL MEDICINE

## 2018-02-22 RX ORDER — SODIUM CHLORIDE 9 MG/ML
250 INJECTION, SOLUTION INTRAVENOUS ONCE
Status: CANCELLED | OUTPATIENT
Start: 2018-02-22

## 2018-02-22 RX ORDER — SODIUM CHLORIDE 9 MG/ML
250 INJECTION, SOLUTION INTRAVENOUS ONCE
Status: DISCONTINUED | OUTPATIENT
Start: 2018-02-22 | End: 2018-02-22 | Stop reason: HOSPADM

## 2018-02-22 RX ADMIN — ZOLEDRONIC ACID 4 MG: 4 INJECTION, SOLUTION, CONCENTRATE INTRAVENOUS at 14:36

## 2018-02-22 RX ADMIN — ERYTHROPOIETIN 40000 UNITS: 40000 INJECTION, SOLUTION INTRAVENOUS; SUBCUTANEOUS at 14:39

## 2018-02-23 LAB
ABO GROUP BLD: NORMAL
ALBUMIN SERPL-MCNC: 3.5 G/DL (ref 2.9–4.4)
ALBUMIN/GLOB SERPL: 1.5 {RATIO} (ref 0.7–1.7)
ALPHA1 GLOB FLD ELPH-MCNC: 0.2 G/DL (ref 0–0.4)
ALPHA2 GLOB SERPL ELPH-MCNC: 0.6 G/DL (ref 0.4–1)
B-GLOBULIN SERPL ELPH-MCNC: 0.9 G/DL (ref 0.7–1.3)
BLD GP AB SCN SERPL QL: NEGATIVE
GAMMA GLOB SERPL ELPH-MCNC: 0.6 G/DL (ref 0.4–1.8)
GLOBULIN SER CALC-MCNC: 2.4 G/DL (ref 2.2–3.9)
IGA SERPL-MCNC: 99 MG/DL (ref 61–437)
IGG SERPL-MCNC: 645 MG/DL (ref 700–1600)
IGM SERPL-MCNC: 75 MG/DL (ref 15–143)
INTERPRETATION SERPL IEP-IMP: ABNORMAL
KAPPA LC SERPL-MCNC: 11.1 MG/L (ref 3.3–19.4)
KAPPA LC/LAMBDA SER: 0.97 {RATIO} (ref 0.26–1.65)
LAMBDA LC FREE SERPL-MCNC: 11.5 MG/L (ref 5.7–26.3)
Lab: ABNORMAL
M-SPIKE: ABNORMAL G/DL
PROT SERPL-MCNC: 5.9 G/DL (ref 6–8.5)
RH BLD: POSITIVE

## 2018-02-24 LAB — B2 MICROGLOB SERPL-MCNC: 1.6 MG/L (ref 0.6–2.4)

## 2018-02-28 DIAGNOSIS — C90.30 PLASMACYTOMA (HCC): ICD-10-CM

## 2018-03-01 ENCOUNTER — INFUSION (OUTPATIENT)
Dept: ONCOLOGY | Facility: HOSPITAL | Age: 78
End: 2018-03-01

## 2018-03-01 VITALS
BODY MASS INDEX: 23.59 KG/M2 | HEIGHT: 73 IN | WEIGHT: 178 LBS | RESPIRATION RATE: 16 BRPM | DIASTOLIC BLOOD PRESSURE: 60 MMHG | SYSTOLIC BLOOD PRESSURE: 132 MMHG | HEART RATE: 74 BPM | TEMPERATURE: 98.4 F

## 2018-03-01 DIAGNOSIS — C90.30 PLASMACYTOMA (HCC): Primary | ICD-10-CM

## 2018-03-01 LAB
ERYTHROCYTE [DISTWIDTH] IN BLOOD BY AUTOMATED COUNT: 26.5 % (ref 11.3–14.5)
HCT VFR BLD AUTO: 27 % (ref 38.9–50.9)
HGB BLD-MCNC: 8.8 G/DL (ref 13.1–17.5)
LYMPHOCYTES # BLD AUTO: 0.6 10*3/MM3 (ref 0.6–4.8)
LYMPHOCYTES NFR BLD AUTO: 18.6 % (ref 24–44)
MCH RBC QN AUTO: 32.4 PG (ref 27–31)
MCHC RBC AUTO-ENTMCNC: 32.5 G/DL (ref 32–36)
MCV RBC AUTO: 99.5 FL (ref 80–99)
MONOCYTES # BLD AUTO: 0.1 10*3/MM3 (ref 0–1)
MONOCYTES NFR BLD AUTO: 2.2 % (ref 0–12)
NEUTROPHILS # BLD AUTO: 2.5 10*3/MM3 (ref 1.5–8.3)
NEUTROPHILS NFR BLD AUTO: 79.2 % (ref 41–71)
PLATELET # BLD AUTO: 113 10*3/MM3 (ref 150–450)
PMV BLD AUTO: 7.6 FL (ref 6–12)
RBC # BLD AUTO: 2.71 10*6/MM3 (ref 4.2–5.76)
WBC NRBC COR # BLD: 3.1 10*3/MM3 (ref 3.5–10.8)

## 2018-03-01 PROCEDURE — 85025 COMPLETE CBC W/AUTO DIFF WBC: CPT | Performed by: INTERNAL MEDICINE

## 2018-03-01 PROCEDURE — 36415 COLL VENOUS BLD VENIPUNCTURE: CPT

## 2018-03-01 PROCEDURE — 63510000001 EPOETIN ALFA PER 1000 UNITS: Performed by: INTERNAL MEDICINE

## 2018-03-01 PROCEDURE — 96372 THER/PROPH/DIAG INJ SC/IM: CPT

## 2018-03-01 RX ADMIN — ERYTHROPOIETIN 40000 UNITS: 40000 INJECTION, SOLUTION INTRAVENOUS; SUBCUTANEOUS at 14:37

## 2018-03-02 DIAGNOSIS — C90.30 PLASMACYTOMA (HCC): ICD-10-CM

## 2018-03-02 RX ORDER — SULFAMETHOXAZOLE AND TRIMETHOPRIM 800; 160 MG/1; MG/1
TABLET ORAL
Qty: 12 TABLET | Refills: 5 | Status: SHIPPED | OUTPATIENT
Start: 2018-03-02 | End: 2019-01-01

## 2018-03-06 ENCOUNTER — TELEPHONE (OUTPATIENT)
Dept: ONCOLOGY | Facility: CLINIC | Age: 78
End: 2018-03-06

## 2018-03-06 NOTE — TELEPHONE ENCOUNTER
Per Dr. Turner, d/c acyclovir.  Patient states he feels like he may need blood on Thursday when he gets his lab and procrit shot.  Patients appt was moved to Thursday at 8:30 per [place].  Patient states understanding to all above.

## 2018-03-08 ENCOUNTER — INFUSION (OUTPATIENT)
Dept: ONCOLOGY | Facility: HOSPITAL | Age: 78
End: 2018-03-08

## 2018-03-08 VITALS
DIASTOLIC BLOOD PRESSURE: 63 MMHG | HEIGHT: 73 IN | BODY MASS INDEX: 23.86 KG/M2 | RESPIRATION RATE: 16 BRPM | WEIGHT: 180 LBS | HEART RATE: 66 BPM | SYSTOLIC BLOOD PRESSURE: 115 MMHG | TEMPERATURE: 97.2 F

## 2018-03-08 DIAGNOSIS — C90.00 MULTIPLE MYELOMA NOT HAVING ACHIEVED REMISSION (HCC): Primary | ICD-10-CM

## 2018-03-08 DIAGNOSIS — D64.81 ANEMIA DUE TO ANTINEOPLASTIC CHEMOTHERAPY: ICD-10-CM

## 2018-03-08 DIAGNOSIS — T45.1X5A ANEMIA DUE TO ANTINEOPLASTIC CHEMOTHERAPY: ICD-10-CM

## 2018-03-08 DIAGNOSIS — C90.30 PLASMACYTOMA (HCC): ICD-10-CM

## 2018-03-08 LAB
ABO GROUP BLD: NORMAL
BLD GP AB SCN SERPL QL: NEGATIVE
ERYTHROCYTE [DISTWIDTH] IN BLOOD BY AUTOMATED COUNT: 27 % (ref 11.3–14.5)
HCT VFR BLD AUTO: 25.6 % (ref 38.9–50.9)
HGB BLD-MCNC: 8.4 G/DL (ref 13.1–17.5)
LYMPHOCYTES # BLD AUTO: 1 10*3/MM3 (ref 0.6–4.8)
LYMPHOCYTES NFR BLD AUTO: 35.4 % (ref 24–44)
MCH RBC QN AUTO: 33.6 PG (ref 27–31)
MCHC RBC AUTO-ENTMCNC: 32.7 G/DL (ref 32–36)
MCV RBC AUTO: 102.8 FL (ref 80–99)
MONOCYTES # BLD AUTO: 0.1 10*3/MM3 (ref 0–1)
MONOCYTES NFR BLD AUTO: 2.5 % (ref 0–12)
NEUTROPHILS # BLD AUTO: 1.7 10*3/MM3 (ref 1.5–8.3)
NEUTROPHILS NFR BLD AUTO: 62.1 % (ref 41–71)
PLATELET # BLD AUTO: 102 10*3/MM3 (ref 150–450)
PMV BLD AUTO: 7.5 FL (ref 6–12)
RBC # BLD AUTO: 2.49 10*6/MM3 (ref 4.2–5.76)
RH BLD: POSITIVE
WBC NRBC COR # BLD: 2.8 10*3/MM3 (ref 3.5–10.8)

## 2018-03-08 PROCEDURE — 63510000001 EPOETIN ALFA PER 1000 UNITS: Performed by: INTERNAL MEDICINE

## 2018-03-08 PROCEDURE — 86901 BLOOD TYPING SEROLOGIC RH(D): CPT | Performed by: INTERNAL MEDICINE

## 2018-03-08 PROCEDURE — P9016 RBC LEUKOCYTES REDUCED: HCPCS

## 2018-03-08 PROCEDURE — 36415 COLL VENOUS BLD VENIPUNCTURE: CPT

## 2018-03-08 PROCEDURE — 86923 COMPATIBILITY TEST ELECTRIC: CPT

## 2018-03-08 PROCEDURE — 85025 COMPLETE CBC W/AUTO DIFF WBC: CPT | Performed by: INTERNAL MEDICINE

## 2018-03-08 PROCEDURE — 86900 BLOOD TYPING SEROLOGIC ABO: CPT

## 2018-03-08 PROCEDURE — 96401 CHEMO ANTI-NEOPL SQ/IM: CPT

## 2018-03-08 PROCEDURE — 96372 THER/PROPH/DIAG INJ SC/IM: CPT

## 2018-03-08 PROCEDURE — 86850 RBC ANTIBODY SCREEN: CPT | Performed by: INTERNAL MEDICINE

## 2018-03-08 PROCEDURE — 36430 TRANSFUSION BLD/BLD COMPNT: CPT

## 2018-03-08 PROCEDURE — 86902 BLOOD TYPE ANTIGEN DONOR EA: CPT

## 2018-03-08 PROCEDURE — 86900 BLOOD TYPING SEROLOGIC ABO: CPT | Performed by: INTERNAL MEDICINE

## 2018-03-08 RX ORDER — SODIUM CHLORIDE 9 MG/ML
250 INJECTION, SOLUTION INTRAVENOUS AS NEEDED
Status: CANCELLED | OUTPATIENT
Start: 2018-03-08

## 2018-03-08 RX ORDER — SODIUM CHLORIDE 9 MG/ML
250 INJECTION, SOLUTION INTRAVENOUS AS NEEDED
Status: DISCONTINUED | OUTPATIENT
Start: 2018-03-08 | End: 2018-03-08 | Stop reason: HOSPADM

## 2018-03-08 RX ADMIN — EPOETIN ALFA 40000 UNITS: 20000 SOLUTION INTRAVENOUS; SUBCUTANEOUS at 09:10

## 2018-03-15 ENCOUNTER — INFUSION (OUTPATIENT)
Dept: ONCOLOGY | Facility: HOSPITAL | Age: 78
End: 2018-03-15

## 2018-03-15 ENCOUNTER — OFFICE VISIT (OUTPATIENT)
Dept: ONCOLOGY | Facility: CLINIC | Age: 78
End: 2018-03-15

## 2018-03-15 VITALS
BODY MASS INDEX: 24.12 KG/M2 | RESPIRATION RATE: 16 BRPM | SYSTOLIC BLOOD PRESSURE: 142 MMHG | HEIGHT: 73 IN | WEIGHT: 182 LBS | TEMPERATURE: 97.1 F | DIASTOLIC BLOOD PRESSURE: 63 MMHG | HEART RATE: 67 BPM

## 2018-03-15 DIAGNOSIS — C90.00 MULTIPLE MYELOMA NOT HAVING ACHIEVED REMISSION (HCC): ICD-10-CM

## 2018-03-15 DIAGNOSIS — T45.1X5A ANEMIA DUE TO ANTINEOPLASTIC CHEMOTHERAPY: ICD-10-CM

## 2018-03-15 DIAGNOSIS — D64.81 ANEMIA DUE TO ANTINEOPLASTIC CHEMOTHERAPY: ICD-10-CM

## 2018-03-15 DIAGNOSIS — C90.01 MULTIPLE MYELOMA IN REMISSION (HCC): Primary | ICD-10-CM

## 2018-03-15 DIAGNOSIS — C90.30 PLASMACYTOMA (HCC): Primary | ICD-10-CM

## 2018-03-15 DIAGNOSIS — C90.30 PLASMACYTOMA (HCC): ICD-10-CM

## 2018-03-15 LAB
ERYTHROCYTE [DISTWIDTH] IN BLOOD BY AUTOMATED COUNT: 25 % (ref 11.3–14.5)
HCT VFR BLD AUTO: 35.1 % (ref 38.9–50.9)
HGB BLD-MCNC: 11.2 G/DL (ref 13.1–17.5)
LYMPHOCYTES # BLD AUTO: 0.8 10*3/MM3 (ref 0.6–4.8)
LYMPHOCYTES NFR BLD AUTO: 22 % (ref 24–44)
MCH RBC QN AUTO: 32 PG (ref 27–31)
MCHC RBC AUTO-ENTMCNC: 31.9 G/DL (ref 32–36)
MCV RBC AUTO: 100.4 FL (ref 80–99)
MONOCYTES # BLD AUTO: 0.1 10*3/MM3 (ref 0–1)
MONOCYTES NFR BLD AUTO: 2.8 % (ref 0–12)
NEUTROPHILS # BLD AUTO: 2.7 10*3/MM3 (ref 1.5–8.3)
NEUTROPHILS NFR BLD AUTO: 75.2 % (ref 41–71)
PLATELET # BLD AUTO: 101 10*3/MM3 (ref 150–450)
PMV BLD AUTO: 7.7 FL (ref 6–12)
RBC # BLD AUTO: 3.5 10*6/MM3 (ref 4.2–5.76)
WBC NRBC COR # BLD: 3.6 10*3/MM3 (ref 3.5–10.8)

## 2018-03-15 PROCEDURE — 36415 COLL VENOUS BLD VENIPUNCTURE: CPT

## 2018-03-15 PROCEDURE — 85025 COMPLETE CBC W/AUTO DIFF WBC: CPT | Performed by: INTERNAL MEDICINE

## 2018-03-15 PROCEDURE — 99214 OFFICE O/P EST MOD 30 MIN: CPT | Performed by: INTERNAL MEDICINE

## 2018-03-15 RX ORDER — SODIUM CHLORIDE 9 MG/ML
250 INJECTION, SOLUTION INTRAVENOUS ONCE
Status: CANCELLED | OUTPATIENT
Start: 2018-03-22

## 2018-03-15 NOTE — PROGRESS NOTES
Chief Complaint   Follow-up IgG myeloma diagnosed in September 2017 with 75% myeloma cells.  Coexisting element of MDS at presentation.  Treated with RVD for proximally 4 cycles with complete response by repeat bone marrow aspiration/biopsy January 9, 2018.  Normocellular marrow with 20% cellularity and no evidence of residual myeloma at that time with negative fish panel on that date, January 9, 2018    Persisting anemia, secondary to recent chemotherapy and probable element of MDS    PROBLEM LIST   Patient Active Problem List   Diagnosis   • CAD (coronary artery disease)   • Dyslipidemia   • Right hip pain   • Arthritis   • Cellulitis of left lower extremity   • Left carotid bruit   • Plasmacytoma   • Brain cancer   • Neck pain   • Anemia   • Multiple myeloma       HISTORY OF PRESENT ILLNESS:   I'll feels pretty well.  He does get tired out when his hemoglobin drops.  His transfusion requirements are decreasing a bit.  He was last transfused about 5 days ago.  He's had no fevers or chills.  His appetite spin fine.  He sleeping well.    Past Medical History, Past Surgical History, Social History, Family History have been reviewed and are without significant changes except as mentioned.      Medications:      Current Outpatient Prescriptions:   •  acyclovir (ZOVIRAX) 400 MG tablet, Take 1 tablet by mouth 2 (Two) Times a Day., Disp: 60 tablet, Rfl: 7  •  allopurinol (ZYLOPRIM) 300 MG tablet, Take 1 tablet by mouth Daily., Disp: 30 tablet, Rfl: 11  •  aspirin 81 MG tablet, Take  by mouth daily., Disp: , Rfl:   •  atorvastatin (LIPITOR) 20 MG tablet, Take  by mouth. 1 QHS, Disp: , Rfl:   •  dexamethasone (DECADRON) 4 MG tablet, Take 10 tablets by mouth Daily With Breakfast. Take with food on Days 1, 8, 15., Disp: 30 tablet, Rfl: 6  •  glimepiride (AMARYL) 2 MG tablet, Take  by mouth daily., Disp: , Rfl:   •  latanoprost (XALATAN) 0.005 % ophthalmic solution, Apply  to eye. qhs, Disp: , Rfl:   •  metoprolol succinate XL  "(TOPROL-XL) 25 MG 24 hr tablet, TAKE 1 TABLET BY MOUTH  DAILY, Disp: 90 tablet, Rfl: 1  •  nitroglycerin (NITROSTAT) 0.4 MG SL tablet, Place 1 tablet under the tongue every 5 (five) minutes as needed for chest pain., Disp: 25 tablet, Rfl: 6  •  ondansetron (ZOFRAN) 8 MG tablet, Take 1 tablet by mouth 3 (Three) Times a Day As Needed for Nausea or Vomiting., Disp: 30 tablet, Rfl: 5  •  ramipril (ALTACE) 5 MG capsule, Take  by mouth. 1 QD, Disp: , Rfl:   •  sulfamethoxazole-trimethoprim (BACTRIM DS) 800-160 MG per tablet, 1 tab PO 3x/week on M-W-F, Disp: 12 tablet, Rfl: 5  •  ZETIA 10 MG tablet, TAKE 1 TABLET BY MOUTH AT  BEDTIME, Disp: 90 tablet, Rfl: 3  No current facility-administered medications for this visit.     Facility-Administered Medications Ordered in Other Visits:   •  sodium chloride 0.9 % infusion 250 mL, 250 mL, Intravenous, PRN, Inocencia Felipe MD  •  sodium chloride 0.9 % infusion 250 mL, 250 mL, Intravenous, Once, Naresh Turner MD    ALLERGIES:    Allergies   Allergen Reactions   • Penicillins Other (See Comments)     \"broke out in sweat\"   • Simvastatin Myalgia     Swelling and pain in shoulder   • Voltaren [Diclofenac Sodium]        ROS:  Review of Systems   Constitutional: Negative for activity change and appetite change.   HENT: Negative for mouth sores, sinus pressure and voice change.    Eyes: Negative for visual disturbance.   Respiratory: Negative for shortness of breath.    Cardiovascular: Negative for chest pain.   Gastrointestinal: Negative for abdominal pain and vomiting.   Genitourinary: Negative for dysuria.   Musculoskeletal: Negative for arthralgias and myalgias.   Skin: Negative for color change.   Neurological: Negative for dizziness, syncope and headaches.   Hematological: Negative for adenopathy.   Psychiatric/Behavioral: Negative for confusion, sleep disturbance and suicidal ideas. The patient is not nervous/anxious.            Objective:    /63 Comment: RUE  Pulse " "67   Temp 97.1 °F (36.2 °C) (Temporal Artery )   Resp 16   Ht 185.4 cm (73\")   Wt 82.6 kg (182 lb)   BMI 24.01 kg/m²     Physical Exam   Constitutional: He is oriented to person, place, and time. He appears well-developed and well-nourished. No distress.   Marcin looks healthy vigorous and youthful.  He certainly looks much younger than his age of almost 78 years.   HENT:   Head: Normocephalic.   Mouth/Throat: Oropharynx is clear and moist.   Eyes: Conjunctivae and EOM are normal. No scleral icterus.   Neck: Normal range of motion. Neck supple. No thyromegaly present.   Cardiovascular: Normal rate, regular rhythm and normal heart sounds.    No murmur heard.  Pulmonary/Chest: Effort normal and breath sounds normal. He has no wheezes. He has no rales.   Abdominal: Soft. Bowel sounds are normal. He exhibits no distension and no mass. There is no tenderness.   Musculoskeletal: He exhibits no edema or tenderness.   Lymphadenopathy:     He has no cervical adenopathy.   Neurological: He is alert and oriented to person, place, and time. He displays normal reflexes. No cranial nerve deficit.   Skin: Skin is warm and dry. No rash noted.   Psychiatric: He has a normal mood and affect. Judgment normal.   Vitals reviewed.             RECENT LABS:  Hematology WBC   Date Value Ref Range Status   03/08/2018 2.80 (L) 3.50 - 10.80 10*3/mm3 Final     Hemoglobin   Date Value Ref Range Status   03/08/2018 8.4 (L) 13.1 - 17.5 g/dL Final     Hematocrit   Date Value Ref Range Status   03/08/2018 25.6 (L) 38.9 - 50.9 % Final     MCV   Date Value Ref Range Status   03/08/2018 102.8 (H) 80.0 - 99.0 fL Final     RDW   Date Value Ref Range Status   03/08/2018 27.0 (H) 11.3 - 14.5 % Final     MPV   Date Value Ref Range Status   03/08/2018 7.5 6.0 - 12.0 fL Final     Platelets   Date Value Ref Range Status   03/08/2018 102 (L) 150 - 450 10*3/mm3 Final     Immature Grans %   Date Value Ref Range Status   01/09/2018 0.0 0.0 - 0.6 % Final "     Neutrophils, Absolute   Date Value Ref Range Status   03/08/2018 1.70 1.50 - 8.30 10*3/mm3 Final     Lymphocytes, Absolute   Date Value Ref Range Status   03/08/2018 1.00 0.60 - 4.80 10*3/mm3 Final     Monocytes, Absolute   Date Value Ref Range Status   03/08/2018 0.10 0.00 - 1.00 10*3/mm3 Final     Eosinophils, Absolute   Date Value Ref Range Status   01/09/2018 0.01 0.00 - 0.30 10*3/mm3 Final     Basophils, Absolute   Date Value Ref Range Status   01/09/2018 0.00 0.00 - 0.20 10*3/mm3 Final     Immature Grans, Absolute   Date Value Ref Range Status   01/09/2018 0.00 0.00 - 0.03 10*3/mm3 Final     nRBC   Date Value Ref Range Status   03/19/2015 0.0  Final       Glucose   Date Value Ref Range Status   02/22/2018 229 (H) 70 - 100 mg/dL Final     Sodium   Date Value Ref Range Status   02/22/2018 135 132 - 146 mmol/L Final     Potassium   Date Value Ref Range Status   02/22/2018 4.2 3.5 - 5.5 mmol/L Final     CO2   Date Value Ref Range Status   02/22/2018 27.0 20.0 - 31.0 mmol/L Final     Chloride   Date Value Ref Range Status   02/22/2018 102 99 - 109 mmol/L Final     Anion Gap   Date Value Ref Range Status   02/22/2018 6.0 3.0 - 11.0 mmol/L Final     Creatinine   Date Value Ref Range Status   02/22/2018 0.80 0.60 - 1.30 mg/dL Final     Comment:     Serial Number: [id number]:  [id number]     BUN   Date Value Ref Range Status   02/22/2018 17 9 - 23 mg/dL Final     BUN/Creatinine Ratio   Date Value Ref Range Status   02/22/2018 18.9 7.0 - 25.0 Final     Calcium   Date Value Ref Range Status   02/22/2018 9.4 8.7 - 10.4 mg/dL Final     eGFR Non  Amer   Date Value Ref Range Status   02/22/2018 81 >60 mL/min/1.73 Final     Alkaline Phosphatase   Date Value Ref Range Status   02/22/2018 51 25 - 100 U/L Final     Total Protein   Date Value Ref Range Status   02/22/2018 6.4 5.7 - 8.2 g/dL Final     ALT (SGPT)   Date Value Ref Range Status   02/22/2018 26 7 - 40 U/L Final     AST (SGOT)   Date Value Ref Range Status    02/22/2018 20 0 - 33 U/L Final     Total Bilirubin   Date Value Ref Range Status   02/22/2018 0.6 0.3 - 1.2 mg/dL Final     Albumin   Date Value Ref Range Status   02/22/2018 4.00 3.20 - 4.80 g/dL Final     Globulin   Date Value Ref Range Status   02/22/2018 2.4 gm/dL Final     A/G Ratio   Date Value Ref Range Status   02/22/2018 1.7 1.5 - 2.5 g/dL Final          Perf Status: 1    Assessment/Plan    Diagnoses and all orders for this visit:    Multiple myeloma in remission      Marcin is doing well from his myeloma standpoint.  I will plan on re-marrow in him in about 3 months which will be 6 months from his last marrow exam.    Persisting anemia.  He will continue his Procrit and he will be transfused as needed.  All the above discussed with Marcin and with his daughter who was with him.  Reglan 25 minutes spent with the patient, more than half of which was counseling him      Naresh Turner MD , 3/15/2018    CC:

## 2018-03-21 DIAGNOSIS — C90.30 PLASMACYTOMA (HCC): ICD-10-CM

## 2018-03-22 ENCOUNTER — INFUSION (OUTPATIENT)
Dept: ONCOLOGY | Facility: HOSPITAL | Age: 78
End: 2018-03-22

## 2018-03-22 VITALS
TEMPERATURE: 98 F | SYSTOLIC BLOOD PRESSURE: 137 MMHG | DIASTOLIC BLOOD PRESSURE: 62 MMHG | HEART RATE: 65 BPM | HEIGHT: 73 IN | BODY MASS INDEX: 23.72 KG/M2 | WEIGHT: 179 LBS | RESPIRATION RATE: 16 BRPM

## 2018-03-22 DIAGNOSIS — C90.00 MULTIPLE MYELOMA NOT HAVING ACHIEVED REMISSION (HCC): Primary | ICD-10-CM

## 2018-03-22 DIAGNOSIS — C90.30 PLASMACYTOMA (HCC): ICD-10-CM

## 2018-03-22 LAB
ALBUMIN SERPL-MCNC: 3.9 G/DL (ref 3.2–4.8)
ALBUMIN/GLOB SERPL: 1.9 G/DL (ref 1.5–2.5)
ALP SERPL-CCNC: 47 U/L (ref 25–100)
ALT SERPL W P-5'-P-CCNC: 24 U/L (ref 7–40)
ANION GAP SERPL CALCULATED.3IONS-SCNC: 6 MMOL/L (ref 3–11)
AST SERPL-CCNC: 21 U/L (ref 0–33)
BILIRUB SERPL-MCNC: 0.6 MG/DL (ref 0.3–1.2)
BUN BLD-MCNC: 18 MG/DL (ref 9–23)
BUN/CREAT SERPL: 22.5 (ref 7–25)
CALCIUM SPEC-SCNC: 8.9 MG/DL (ref 8.7–10.4)
CHLORIDE SERPL-SCNC: 106 MMOL/L (ref 99–109)
CO2 SERPL-SCNC: 27 MMOL/L (ref 20–31)
CREAT BLD-MCNC: 0.8 MG/DL (ref 0.6–1.3)
CREAT BLDA-MCNC: 0.7 MG/DL (ref 0.6–1.3)
ERYTHROCYTE [DISTWIDTH] IN BLOOD BY AUTOMATED COUNT: 25.3 % (ref 11.3–14.5)
GFR SERPL CREATININE-BSD FRML MDRD: 93 ML/MIN/1.73
GLOBULIN UR ELPH-MCNC: 2.1 GM/DL
GLUCOSE BLD-MCNC: 195 MG/DL (ref 70–100)
HCT VFR BLD AUTO: 35 % (ref 38.9–50.9)
HGB BLD-MCNC: 11.5 G/DL (ref 13.1–17.5)
LYMPHOCYTES # BLD AUTO: 0.7 10*3/MM3 (ref 0.6–4.8)
LYMPHOCYTES NFR BLD AUTO: 20.3 % (ref 24–44)
MAGNESIUM SERPL-MCNC: 2.2 MG/DL (ref 1.3–2.7)
MCH RBC QN AUTO: 33.1 PG (ref 27–31)
MCHC RBC AUTO-ENTMCNC: 32.7 G/DL (ref 32–36)
MCV RBC AUTO: 101 FL (ref 80–99)
MONOCYTES # BLD AUTO: 0 10*3/MM3 (ref 0–1)
MONOCYTES NFR BLD AUTO: 1.3 % (ref 0–12)
NEUTROPHILS # BLD AUTO: 2.7 10*3/MM3 (ref 1.5–8.3)
NEUTROPHILS NFR BLD AUTO: 78.4 % (ref 41–71)
PHOSPHATE SERPL-MCNC: 2.1 MG/DL (ref 2.4–5.1)
PLATELET # BLD AUTO: 137 10*3/MM3 (ref 150–450)
PMV BLD AUTO: 7.7 FL (ref 6–12)
POTASSIUM BLD-SCNC: 4.3 MMOL/L (ref 3.5–5.5)
PROT SERPL-MCNC: 6 G/DL (ref 5.7–8.2)
RBC # BLD AUTO: 3.46 10*6/MM3 (ref 4.2–5.76)
SODIUM BLD-SCNC: 139 MMOL/L (ref 132–146)
WBC NRBC COR # BLD: 3.5 10*3/MM3 (ref 3.5–10.8)

## 2018-03-22 PROCEDURE — 85025 COMPLETE CBC W/AUTO DIFF WBC: CPT | Performed by: INTERNAL MEDICINE

## 2018-03-22 PROCEDURE — 84100 ASSAY OF PHOSPHORUS: CPT | Performed by: INTERNAL MEDICINE

## 2018-03-22 PROCEDURE — 36415 COLL VENOUS BLD VENIPUNCTURE: CPT

## 2018-03-22 PROCEDURE — 25010000002 ZOLEDRONIC ACID PER 1 MG: Performed by: INTERNAL MEDICINE

## 2018-03-22 PROCEDURE — 83735 ASSAY OF MAGNESIUM: CPT | Performed by: INTERNAL MEDICINE

## 2018-03-22 PROCEDURE — 96374 THER/PROPH/DIAG INJ IV PUSH: CPT

## 2018-03-22 PROCEDURE — 82565 ASSAY OF CREATININE: CPT

## 2018-03-22 PROCEDURE — 80053 COMPREHEN METABOLIC PANEL: CPT | Performed by: INTERNAL MEDICINE

## 2018-03-22 RX ORDER — SODIUM CHLORIDE 9 MG/ML
250 INJECTION, SOLUTION INTRAVENOUS ONCE
Status: DISCONTINUED | OUTPATIENT
Start: 2018-03-22 | End: 2018-03-22 | Stop reason: HOSPADM

## 2018-03-22 RX ADMIN — ZOLEDRONIC ACID 4 MG: 4 INJECTION, SOLUTION, CONCENTRATE INTRAVENOUS at 13:33

## 2018-03-28 DIAGNOSIS — C90.30 PLASMACYTOMA (HCC): ICD-10-CM

## 2018-03-29 ENCOUNTER — INFUSION (OUTPATIENT)
Dept: ONCOLOGY | Facility: HOSPITAL | Age: 78
End: 2018-03-29

## 2018-03-29 VITALS
BODY MASS INDEX: 23.99 KG/M2 | DIASTOLIC BLOOD PRESSURE: 80 MMHG | SYSTOLIC BLOOD PRESSURE: 132 MMHG | HEIGHT: 73 IN | WEIGHT: 181 LBS | HEART RATE: 75 BPM | TEMPERATURE: 98.4 F | RESPIRATION RATE: 16 BRPM

## 2018-03-29 DIAGNOSIS — C90.30 PLASMACYTOMA (HCC): ICD-10-CM

## 2018-03-29 LAB
ERYTHROCYTE [DISTWIDTH] IN BLOOD BY AUTOMATED COUNT: 24.3 % (ref 11.3–14.5)
HCT VFR BLD AUTO: 32 % (ref 38.9–50.9)
HGB BLD-MCNC: 10.5 G/DL (ref 13.1–17.5)
LYMPHOCYTES # BLD AUTO: 0.8 10*3/MM3 (ref 0.6–4.8)
LYMPHOCYTES NFR BLD AUTO: 19.4 % (ref 24–44)
MCH RBC QN AUTO: 33.5 PG (ref 27–31)
MCHC RBC AUTO-ENTMCNC: 32.9 G/DL (ref 32–36)
MCV RBC AUTO: 101.8 FL (ref 80–99)
MONOCYTES # BLD AUTO: 0.1 10*3/MM3 (ref 0–1)
MONOCYTES NFR BLD AUTO: 1.9 % (ref 0–12)
NEUTROPHILS # BLD AUTO: 3.4 10*3/MM3 (ref 1.5–8.3)
NEUTROPHILS NFR BLD AUTO: 78.7 % (ref 41–71)
PLATELET # BLD AUTO: 142 10*3/MM3 (ref 150–450)
PMV BLD AUTO: 7.1 FL (ref 6–12)
RBC # BLD AUTO: 3.15 10*6/MM3 (ref 4.2–5.76)
WBC NRBC COR # BLD: 4.3 10*3/MM3 (ref 3.5–10.8)

## 2018-03-29 PROCEDURE — 85025 COMPLETE CBC W/AUTO DIFF WBC: CPT | Performed by: INTERNAL MEDICINE

## 2018-03-29 PROCEDURE — 36415 COLL VENOUS BLD VENIPUNCTURE: CPT

## 2018-04-05 ENCOUNTER — INFUSION (OUTPATIENT)
Dept: ONCOLOGY | Facility: HOSPITAL | Age: 78
End: 2018-04-05

## 2018-04-05 VITALS
RESPIRATION RATE: 16 BRPM | SYSTOLIC BLOOD PRESSURE: 130 MMHG | HEART RATE: 77 BPM | HEIGHT: 73 IN | DIASTOLIC BLOOD PRESSURE: 64 MMHG | WEIGHT: 181 LBS | BODY MASS INDEX: 23.99 KG/M2 | TEMPERATURE: 97.7 F

## 2018-04-05 DIAGNOSIS — C90.30 PLASMACYTOMA (HCC): ICD-10-CM

## 2018-04-05 LAB
ERYTHROCYTE [DISTWIDTH] IN BLOOD BY AUTOMATED COUNT: 24 % (ref 11.3–14.5)
HCT VFR BLD AUTO: 35.1 % (ref 38.9–50.9)
HGB BLD-MCNC: 11.2 G/DL (ref 13.1–17.5)
LYMPHOCYTES # BLD AUTO: 0.7 10*3/MM3 (ref 0.6–4.8)
LYMPHOCYTES NFR BLD AUTO: 14.6 % (ref 24–44)
MCH RBC QN AUTO: 32.7 PG (ref 27–31)
MCHC RBC AUTO-ENTMCNC: 31.9 G/DL (ref 32–36)
MCV RBC AUTO: 102.4 FL (ref 80–99)
MONOCYTES # BLD AUTO: 0.1 10*3/MM3 (ref 0–1)
MONOCYTES NFR BLD AUTO: 1.5 % (ref 0–12)
NEUTROPHILS # BLD AUTO: 3.9 10*3/MM3 (ref 1.5–8.3)
NEUTROPHILS NFR BLD AUTO: 83.9 % (ref 41–71)
PLATELET # BLD AUTO: 126 10*3/MM3 (ref 150–450)
PMV BLD AUTO: 7.8 FL (ref 6–12)
RBC # BLD AUTO: 3.43 10*6/MM3 (ref 4.2–5.76)
WBC NRBC COR # BLD: 4.6 10*3/MM3 (ref 3.5–10.8)

## 2018-04-05 PROCEDURE — 85025 COMPLETE CBC W/AUTO DIFF WBC: CPT

## 2018-04-05 PROCEDURE — 36415 COLL VENOUS BLD VENIPUNCTURE: CPT

## 2018-04-06 RX ORDER — METOPROLOL SUCCINATE 25 MG/1
TABLET, EXTENDED RELEASE ORAL
Qty: 90 TABLET | Refills: 2 | Status: SHIPPED | OUTPATIENT
Start: 2018-04-06 | End: 2018-12-31 | Stop reason: SDUPTHER

## 2018-04-12 ENCOUNTER — INFUSION (OUTPATIENT)
Dept: ONCOLOGY | Facility: HOSPITAL | Age: 78
End: 2018-04-12

## 2018-04-12 ENCOUNTER — SPECIALTY PHARMACY (OUTPATIENT)
Dept: ONCOLOGY | Facility: HOSPITAL | Age: 78
End: 2018-04-12

## 2018-04-12 ENCOUNTER — OFFICE VISIT (OUTPATIENT)
Dept: ONCOLOGY | Facility: CLINIC | Age: 78
End: 2018-04-12

## 2018-04-12 VITALS
SYSTOLIC BLOOD PRESSURE: 116 MMHG | TEMPERATURE: 98.3 F | WEIGHT: 179 LBS | HEART RATE: 67 BPM | RESPIRATION RATE: 16 BRPM | BODY MASS INDEX: 23.72 KG/M2 | HEIGHT: 73 IN | DIASTOLIC BLOOD PRESSURE: 61 MMHG

## 2018-04-12 DIAGNOSIS — D64.81 ANEMIA DUE TO ANTINEOPLASTIC CHEMOTHERAPY: ICD-10-CM

## 2018-04-12 DIAGNOSIS — C90.00 MULTIPLE MYELOMA NOT HAVING ACHIEVED REMISSION (HCC): ICD-10-CM

## 2018-04-12 DIAGNOSIS — C90.30 PLASMACYTOMA (HCC): ICD-10-CM

## 2018-04-12 DIAGNOSIS — C90.00 MULTIPLE MYELOMA NOT HAVING ACHIEVED REMISSION (HCC): Primary | ICD-10-CM

## 2018-04-12 DIAGNOSIS — T45.1X5A ANEMIA DUE TO ANTINEOPLASTIC CHEMOTHERAPY: ICD-10-CM

## 2018-04-12 DIAGNOSIS — C90.01 MULTIPLE MYELOMA IN REMISSION (HCC): Primary | ICD-10-CM

## 2018-04-12 LAB
ERYTHROCYTE [DISTWIDTH] IN BLOOD BY AUTOMATED COUNT: 23.6 % (ref 11.3–14.5)
HCT VFR BLD AUTO: 36.3 % (ref 38.9–50.9)
HGB BLD-MCNC: 11.4 G/DL (ref 13.1–17.5)
LYMPHOCYTES # BLD AUTO: 1.9 10*3/MM3 (ref 0.6–4.8)
LYMPHOCYTES NFR BLD AUTO: 38.6 % (ref 24–44)
MCH RBC QN AUTO: 32.7 PG (ref 27–31)
MCHC RBC AUTO-ENTMCNC: 31.5 G/DL (ref 32–36)
MCV RBC AUTO: 103.9 FL (ref 80–99)
MONOCYTES # BLD AUTO: 0.2 10*3/MM3 (ref 0–1)
MONOCYTES NFR BLD AUTO: 4.2 % (ref 0–12)
NEUTROPHILS # BLD AUTO: 2.9 10*3/MM3 (ref 1.5–8.3)
NEUTROPHILS NFR BLD AUTO: 57.2 % (ref 41–71)
PLATELET # BLD AUTO: 145 10*3/MM3 (ref 150–450)
PMV BLD AUTO: 7.8 FL (ref 6–12)
RBC # BLD AUTO: 3.49 10*6/MM3 (ref 4.2–5.76)
WBC NRBC COR # BLD: 5 10*3/MM3 (ref 3.5–10.8)

## 2018-04-12 PROCEDURE — 85025 COMPLETE CBC W/AUTO DIFF WBC: CPT | Performed by: INTERNAL MEDICINE

## 2018-04-12 PROCEDURE — 36415 COLL VENOUS BLD VENIPUNCTURE: CPT

## 2018-04-12 PROCEDURE — 99214 OFFICE O/P EST MOD 30 MIN: CPT | Performed by: INTERNAL MEDICINE

## 2018-04-12 RX ORDER — SODIUM CHLORIDE 9 MG/ML
250 INJECTION, SOLUTION INTRAVENOUS ONCE
Status: CANCELLED | OUTPATIENT
Start: 2018-04-19

## 2018-04-12 RX ORDER — LENALIDOMIDE 15 MG/1
CAPSULE ORAL
Qty: 21 CAPSULE | Refills: 0 | Status: SHIPPED | OUTPATIENT
Start: 2018-04-12 | End: 2018-07-03 | Stop reason: SINTOL

## 2018-04-12 NOTE — PROGRESS NOTES
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  Prolonged anemia associated with RVD chemotherapy.  Complete response by reassessment bone marrow and other labs early January 2018    PROBLEM LIST   Patient Active Problem List   Diagnosis   • CAD (coronary artery disease)   • Dyslipidemia   • Right hip pain   • Arthritis   • Cellulitis of left lower extremity   • Left carotid bruit   • Plasmacytoma   • Brain cancer   • Neck pain   • Anemia   • Multiple myeloma       HISTORY OF PRESENT ILLNESS:   Marcin feels well.  His hemoglobin and hematocrit are finally improved to the near normal range.  He has therefore not received any Procrit for some time.  He's had no fevers chills or sweats.  Appetite good.  His breathing comfortably.    Past Medical History, Past Surgical History, Social History, Family History have been reviewed and are without significant changes except as mentioned.      Medications:      Current Outpatient Prescriptions:   •  allopurinol (ZYLOPRIM) 300 MG tablet, Take 1 tablet by mouth Daily., Disp: 30 tablet, Rfl: 11  •  aspirin 81 MG tablet, Take  by mouth daily., Disp: , Rfl:   •  atorvastatin (LIPITOR) 20 MG tablet, Take  by mouth. 1 QHS, Disp: , Rfl:   •  dexamethasone (DECADRON) 4 MG tablet, Take 10 tablets by mouth Daily With Breakfast. Take with food on Days 1, 8, 15., Disp: 30 tablet, Rfl: 6  •  glimepiride (AMARYL) 2 MG tablet, Take  by mouth daily., Disp: , Rfl:   •  latanoprost (XALATAN) 0.005 % ophthalmic solution, Apply  to eye. qhs, Disp: , Rfl:   •  metoprolol succinate XL (TOPROL-XL) 25 MG 24 hr tablet, TAKE 1 TABLET BY MOUTH  DAILY, Disp: 90 tablet, Rfl: 2  •  nitroglycerin (NITROSTAT) 0.4 MG SL tablet, Place 1 tablet under the tongue every 5 (five) minutes as needed for chest pain., Disp: 25 tablet, Rfl: 6  •  ondansetron (ZOFRAN) 8 MG tablet, Take 1 tablet by mouth 3 (Three) Times a Day As Needed for Nausea or Vomiting., Disp: 30 tablet, Rfl: 5  •   "sulfamethoxazole-trimethoprim (BACTRIM DS) 800-160 MG per tablet, 1 tab PO 3x/week on M-W-F, Disp: 12 tablet, Rfl: 5  •  ZETIA 10 MG tablet, TAKE 1 TABLET BY MOUTH AT  BEDTIME, Disp: 90 tablet, Rfl: 3  No current facility-administered medications for this visit.     Facility-Administered Medications Ordered in Other Visits:   •  sodium chloride 0.9 % infusion 250 mL, 250 mL, Intravenous, PRN, Inocencia Felipe MD  •  sodium chloride 0.9 % infusion 250 mL, 250 mL, Intravenous, Once, Naresh Turner MD    ALLERGIES:    Allergies   Allergen Reactions   • Penicillins Other (See Comments)     \"broke out in sweat\"   • Simvastatin Myalgia     Swelling and pain in shoulder   • Voltaren [Diclofenac Sodium]        ROS:  Review of Systems   Constitutional: Negative for activity change and appetite change.   HENT: Negative for mouth sores, sinus pressure and voice change.    Eyes: Negative for visual disturbance.   Respiratory: Negative for shortness of breath.    Cardiovascular: Negative for chest pain.   Gastrointestinal: Negative for abdominal pain and vomiting.   Genitourinary: Negative for dysuria.   Musculoskeletal: Negative for arthralgias and myalgias.   Skin: Negative for color change.   Neurological: Negative for dizziness, syncope and headaches.   Hematological: Negative for adenopathy.   Psychiatric/Behavioral: Negative for confusion, sleep disturbance and suicidal ideas. The patient is not nervous/anxious.            Objective:    /61   Pulse 67   Temp 98.3 °F (36.8 °C)   Resp 16   Ht 185.4 cm (73\")   Wt 81.2 kg (179 lb)   BMI 23.62 kg/m²     Physical Exam   Constitutional: He is oriented to person, place, and time. He appears well-developed and well-nourished. No distress.   Patient appears youthful and healthy.  One would never guess his diagnosis   HENT:   Head: Normocephalic.   Mouth/Throat: Oropharynx is clear and moist.   Eyes: Conjunctivae and EOM are normal. No scleral icterus.   Neck: Normal " range of motion. Neck supple. No thyromegaly present.   Cardiovascular: Normal rate, regular rhythm and normal heart sounds.    No murmur heard.  Pulmonary/Chest: Effort normal and breath sounds normal. He has no wheezes. He has no rales.   Abdominal: Soft. Bowel sounds are normal. He exhibits no distension and no mass. There is no tenderness.   Musculoskeletal: He exhibits no edema or tenderness.   Lymphadenopathy:     He has no cervical adenopathy.   Neurological: He is alert and oriented to person, place, and time. He displays normal reflexes. No cranial nerve deficit.   Skin: Skin is warm and dry. No rash noted.   Psychiatric: He has a normal mood and affect. Judgment normal.   Vitals reviewed.             RECENT LABS:  Hematology WBC   Date Value Ref Range Status   04/05/2018 4.60 3.50 - 10.80 10*3/mm3 Final     Hemoglobin   Date Value Ref Range Status   04/05/2018 11.2 (L) 13.1 - 17.5 g/dL Final     Hematocrit   Date Value Ref Range Status   04/05/2018 35.1 (L) 38.9 - 50.9 % Final     MCV   Date Value Ref Range Status   04/05/2018 102.4 (H) 80.0 - 99.0 fL Final     RDW   Date Value Ref Range Status   04/05/2018 24.0 (H) 11.3 - 14.5 % Final     MPV   Date Value Ref Range Status   04/05/2018 7.8 6.0 - 12.0 fL Final     Platelets   Date Value Ref Range Status   04/05/2018 126 (L) 150 - 450 10*3/mm3 Final     Immature Grans %   Date Value Ref Range Status   01/09/2018 0.0 0.0 - 0.6 % Final     Neutrophils, Absolute   Date Value Ref Range Status   04/05/2018 3.90 1.50 - 8.30 10*3/mm3 Final     Lymphocytes, Absolute   Date Value Ref Range Status   04/05/2018 0.70 0.60 - 4.80 10*3/mm3 Final     Monocytes, Absolute   Date Value Ref Range Status   04/05/2018 0.10 0.00 - 1.00 10*3/mm3 Final     Eosinophils, Absolute   Date Value Ref Range Status   01/09/2018 0.01 0.00 - 0.30 10*3/mm3 Final     Basophils, Absolute   Date Value Ref Range Status   01/09/2018 0.00 0.00 - 0.20 10*3/mm3 Final     Immature Grans, Absolute    Date Value Ref Range Status   01/09/2018 0.00 0.00 - 0.03 10*3/mm3 Final     nRBC   Date Value Ref Range Status   03/19/2015 0.0  Final       Glucose   Date Value Ref Range Status   03/22/2018 195 (H) 70 - 100 mg/dL Final     Sodium   Date Value Ref Range Status   03/22/2018 139 132 - 146 mmol/L Final     Potassium   Date Value Ref Range Status   03/22/2018 4.3 3.5 - 5.5 mmol/L Final     CO2   Date Value Ref Range Status   03/22/2018 27.0 20.0 - 31.0 mmol/L Final     Chloride   Date Value Ref Range Status   03/22/2018 106 99 - 109 mmol/L Final     Anion Gap   Date Value Ref Range Status   03/22/2018 6.0 3.0 - 11.0 mmol/L Final     Creatinine   Date Value Ref Range Status   03/22/2018 0.70 0.60 - 1.30 mg/dL Final     Comment:     Serial Number: [id number]:  [id number]     BUN   Date Value Ref Range Status   03/22/2018 18 9 - 23 mg/dL Final     BUN/Creatinine Ratio   Date Value Ref Range Status   03/22/2018 22.5 7.0 - 25.0 Final     Calcium   Date Value Ref Range Status   03/22/2018 8.9 8.7 - 10.4 mg/dL Final     eGFR Non  Amer   Date Value Ref Range Status   03/22/2018 93 >60 mL/min/1.73 Final     Alkaline Phosphatase   Date Value Ref Range Status   03/22/2018 47 25 - 100 U/L Final     Total Protein   Date Value Ref Range Status   03/22/2018 6.0 5.7 - 8.2 g/dL Final     ALT (SGPT)   Date Value Ref Range Status   03/22/2018 24 7 - 40 U/L Final     AST (SGOT)   Date Value Ref Range Status   03/22/2018 21 0 - 33 U/L Final     Total Bilirubin   Date Value Ref Range Status   03/22/2018 0.6 0.3 - 1.2 mg/dL Final     Albumin   Date Value Ref Range Status   03/22/2018 3.90 3.20 - 4.80 g/dL Final     Globulin   Date Value Ref Range Status   03/22/2018 2.1 gm/dL Final     A/G Ratio   Date Value Ref Range Status   03/22/2018 1.9 1.5 - 2.5 g/dL Final          Perf Status:0    Assessment/Plan    Diagnoses and all orders for this visit:    Multiple myeloma in remission    Anemia due to antineoplastic  chemotherapy      Marcin is doing well.  His anemia has finally resolved nearly.    After thinking things over I like him to continue his maintenance dexamethasone once weekly.  I'm going to ask him to start I'll probably re-marrow him about 2 or 3 months from now      Naresh Turner MD , 4/12/2018    CC:

## 2018-04-13 ENCOUNTER — SPECIALTY PHARMACY (OUTPATIENT)
Dept: ONCOLOGY | Facility: HOSPITAL | Age: 78
End: 2018-04-13

## 2018-04-13 DIAGNOSIS — C90.00 MULTIPLE MYELOMA NOT HAVING ACHIEVED REMISSION (HCC): Primary | ICD-10-CM

## 2018-04-13 RX ORDER — DEXAMETHASONE 4 MG/1
40 TABLET ORAL
Qty: 40 TABLET | Refills: 3 | Status: SHIPPED | OUTPATIENT
Start: 2018-04-13 | End: 2018-07-13

## 2018-04-13 NOTE — PROGRESS NOTES
Oral Chemotherapy Teaching      Patient Name/:  Marcin Joyce   1940  Oral Chemotherapy Regimen:  Lenalidomide 15mg PO daily on days 1-14, followed by 14 days off + Dex 40mg weekly  Date Started Medication :    Cycle 1: anticipate 2018      Pt reports starting dexamethasone 40mg weekly on  of each week.     Initial Teaching Follow Up Comments     Safety     Storage instructions (away from children; away from heat/cold, sunlight, or moisture), handling - use of gloves (caregivers), washing hands after touching pills, managing waste     “How are you storing your medications?”, reminders on storage, proper handling (caregivers using gloves, washing hands, away from children, managing waste, etc.), disposal of medication with D/C or dosage change    Discussed appropriate handling and storage of oral chemotherapy. Advised on storing at room temp away from pets or children. Pt to wash hands after handling. Do not share medication. Advised of teratogenic nature of medication. Pt  verbalized understanding.      Adherence      patient and/or caregiver on how to take medication, take with/without food, assess their adherence potential, stress importance of adherence, ways to manage adherence (pill boxes, phone reminders, calendars), what to do if miss a dose   “How are you taking your medication?” “How are you remembering to take your medication?”, “How many doses have you missed?”, determine reasons for non-adherence (not remembering, side effects, etc), ways to improve, overadherence? Remind patient of ways to improve/maintain adherence   Discussed plan for  Lenalidomide administered daily at same time for 14 days, followed by 14 days off. Reports having script on hand from previous use that was dispensed in December but never opened. Plans to use this bottle for first cycle. Script at [facility] also awaiting fill. Discussed plan to start treatment with Revlimid on 18, will mailed calendar. [facility] to  reach out to set up next deliver on 5/1/18 as well. Pt reports continuing with dex 40mg weekly administered on Thursdays. Discussed supportive care medications which include: Aspirin, PPI, and and Zofran.       Side Effects/Adverse Reactions      patient on potential side effects, s/s, ways to manage, when to call MD/seek help     Determine if patient experiencing side effects, ways to manage  Pt with previous experience using Relvimid and Dexamethasone in the past. No questions at this time regarding side effects or tolerance.      Miscellaneous     Food interactions, DDIs, financial issues Determine if patient started any new medications since being placed on oral chemo (analyze for DDI)      Additional Notes: Reviewed aforementioned material with patient. Will mail out calendar .

## 2018-04-19 ENCOUNTER — INFUSION (OUTPATIENT)
Dept: ONCOLOGY | Facility: HOSPITAL | Age: 78
End: 2018-04-19

## 2018-04-19 VITALS
WEIGHT: 180 LBS | RESPIRATION RATE: 16 BRPM | DIASTOLIC BLOOD PRESSURE: 63 MMHG | TEMPERATURE: 97.6 F | BODY MASS INDEX: 23.86 KG/M2 | HEIGHT: 73 IN | HEART RATE: 69 BPM | SYSTOLIC BLOOD PRESSURE: 137 MMHG

## 2018-04-19 DIAGNOSIS — C90.00 MULTIPLE MYELOMA NOT HAVING ACHIEVED REMISSION (HCC): Primary | ICD-10-CM

## 2018-04-19 LAB
ALBUMIN SERPL-MCNC: 4 G/DL (ref 3.2–4.8)
ALBUMIN/GLOB SERPL: 1.9 G/DL (ref 1.5–2.5)
ALP SERPL-CCNC: 52 U/L (ref 25–100)
ALT SERPL W P-5'-P-CCNC: 23 U/L (ref 7–40)
ANION GAP SERPL CALCULATED.3IONS-SCNC: 4 MMOL/L (ref 3–11)
AST SERPL-CCNC: 19 U/L (ref 0–33)
BILIRUB SERPL-MCNC: 0.6 MG/DL (ref 0.3–1.2)
BUN BLD-MCNC: 20 MG/DL (ref 9–23)
BUN/CREAT SERPL: 25 (ref 7–25)
CALCIUM SPEC-SCNC: 9.1 MG/DL (ref 8.7–10.4)
CHLORIDE SERPL-SCNC: 103 MMOL/L (ref 99–109)
CO2 SERPL-SCNC: 29 MMOL/L (ref 20–31)
CREAT BLD-MCNC: 0.8 MG/DL (ref 0.6–1.3)
CREAT BLDA-MCNC: 0.7 MG/DL (ref 0.6–1.3)
CREAT BLDA-MCNC: 0.7 MG/DL (ref 0.6–1.3)
ERYTHROCYTE [DISTWIDTH] IN BLOOD BY AUTOMATED COUNT: 22.9 % (ref 11.3–14.5)
GFR SERPL CREATININE-BSD FRML MDRD: 93 ML/MIN/1.73
GLOBULIN UR ELPH-MCNC: 2.1 GM/DL
GLUCOSE BLD-MCNC: 191 MG/DL (ref 70–100)
HCT VFR BLD AUTO: 34.1 % (ref 38.9–50.9)
HGB BLD-MCNC: 10.9 G/DL (ref 13.1–17.5)
LYMPHOCYTES # BLD AUTO: 0.6 10*3/MM3 (ref 0.6–4.8)
LYMPHOCYTES NFR BLD AUTO: 17.3 % (ref 24–44)
MAGNESIUM SERPL-MCNC: 2.1 MG/DL (ref 1.3–2.7)
MCH RBC QN AUTO: 33.5 PG (ref 27–31)
MCHC RBC AUTO-ENTMCNC: 32 G/DL (ref 32–36)
MCV RBC AUTO: 104.5 FL (ref 80–99)
MONOCYTES # BLD AUTO: 0.1 10*3/MM3 (ref 0–1)
MONOCYTES NFR BLD AUTO: 1.6 % (ref 0–12)
NEUTROPHILS # BLD AUTO: 2.8 10*3/MM3 (ref 1.5–8.3)
NEUTROPHILS NFR BLD AUTO: 81.1 % (ref 41–71)
PHOSPHATE SERPL-MCNC: 2.2 MG/DL (ref 2.4–5.1)
PLATELET # BLD AUTO: 154 10*3/MM3 (ref 150–450)
PMV BLD AUTO: 7.4 FL (ref 6–12)
POTASSIUM BLD-SCNC: 4.3 MMOL/L (ref 3.5–5.5)
PROT SERPL-MCNC: 6.1 G/DL (ref 5.7–8.2)
RBC # BLD AUTO: 3.26 10*6/MM3 (ref 4.2–5.76)
SODIUM BLD-SCNC: 136 MMOL/L (ref 132–146)
WBC NRBC COR # BLD: 3.4 10*3/MM3 (ref 3.5–10.8)

## 2018-04-19 PROCEDURE — 85025 COMPLETE CBC W/AUTO DIFF WBC: CPT | Performed by: INTERNAL MEDICINE

## 2018-04-19 PROCEDURE — 96374 THER/PROPH/DIAG INJ IV PUSH: CPT

## 2018-04-19 PROCEDURE — 25010000002 ZOLEDRONIC ACID PER 1 MG: Performed by: INTERNAL MEDICINE

## 2018-04-19 PROCEDURE — 80053 COMPREHEN METABOLIC PANEL: CPT | Performed by: INTERNAL MEDICINE

## 2018-04-19 PROCEDURE — 84100 ASSAY OF PHOSPHORUS: CPT | Performed by: INTERNAL MEDICINE

## 2018-04-19 PROCEDURE — 83735 ASSAY OF MAGNESIUM: CPT | Performed by: INTERNAL MEDICINE

## 2018-04-19 PROCEDURE — 82565 ASSAY OF CREATININE: CPT

## 2018-04-19 RX ORDER — SODIUM CHLORIDE 9 MG/ML
250 INJECTION, SOLUTION INTRAVENOUS ONCE
Status: COMPLETED | OUTPATIENT
Start: 2018-04-19 | End: 2018-04-19

## 2018-04-19 RX ADMIN — SODIUM CHLORIDE 250 ML: 9 INJECTION, SOLUTION INTRAVENOUS at 13:37

## 2018-04-19 RX ADMIN — ZOLEDRONIC ACID 4 MG: 4 INJECTION, SOLUTION, CONCENTRATE INTRAVENOUS at 13:37

## 2018-05-07 RX ORDER — ALLOPURINOL 300 MG/1
300 TABLET ORAL DAILY
Qty: 30 TABLET | Refills: 11 | Status: SHIPPED | OUTPATIENT
Start: 2018-05-07 | End: 2019-01-01

## 2018-05-08 RX ORDER — ACYCLOVIR 400 MG/1
400 TABLET ORAL 2 TIMES DAILY
Qty: 60 TABLET | Refills: 11 | Status: SHIPPED | OUTPATIENT
Start: 2018-05-08 | End: 2018-06-28

## 2018-05-09 DIAGNOSIS — C90.30 PLASMACYTOMA (HCC): ICD-10-CM

## 2018-05-10 ENCOUNTER — INFUSION (OUTPATIENT)
Dept: ONCOLOGY | Facility: HOSPITAL | Age: 78
End: 2018-05-10

## 2018-05-10 VITALS
HEIGHT: 73 IN | BODY MASS INDEX: 23.33 KG/M2 | HEART RATE: 73 BPM | DIASTOLIC BLOOD PRESSURE: 81 MMHG | WEIGHT: 176 LBS | SYSTOLIC BLOOD PRESSURE: 143 MMHG | TEMPERATURE: 97.9 F | RESPIRATION RATE: 16 BRPM

## 2018-05-10 DIAGNOSIS — C90.30 PLASMACYTOMA (HCC): ICD-10-CM

## 2018-05-10 DIAGNOSIS — C90.00 MULTIPLE MYELOMA NOT HAVING ACHIEVED REMISSION (HCC): ICD-10-CM

## 2018-05-10 LAB
ERYTHROCYTE [DISTWIDTH] IN BLOOD BY AUTOMATED COUNT: 18.6 % (ref 11.3–14.5)
HCT VFR BLD AUTO: 35.3 % (ref 38.9–50.9)
HGB BLD-MCNC: 11.5 G/DL (ref 13.1–17.5)
LYMPHOCYTES # BLD AUTO: 0.7 10*3/MM3 (ref 0.6–4.8)
LYMPHOCYTES NFR BLD AUTO: 12.2 % (ref 24–44)
MCH RBC QN AUTO: 34 PG (ref 27–31)
MCHC RBC AUTO-ENTMCNC: 32.5 G/DL (ref 32–36)
MCV RBC AUTO: 104.8 FL (ref 80–99)
MONOCYTES # BLD AUTO: 0.1 10*3/MM3 (ref 0–1)
MONOCYTES NFR BLD AUTO: 2 % (ref 0–12)
NEUTROPHILS # BLD AUTO: 4.8 10*3/MM3 (ref 1.5–8.3)
NEUTROPHILS NFR BLD AUTO: 85.8 % (ref 41–71)
PLATELET # BLD AUTO: 132 10*3/MM3 (ref 150–450)
PMV BLD AUTO: 8.5 FL (ref 6–12)
RBC # BLD AUTO: 3.37 10*6/MM3 (ref 4.2–5.76)
WBC NRBC COR # BLD: 5.6 10*3/MM3 (ref 3.5–10.8)

## 2018-05-10 PROCEDURE — G0463 HOSPITAL OUTPT CLINIC VISIT: HCPCS

## 2018-05-10 PROCEDURE — 85025 COMPLETE CBC W/AUTO DIFF WBC: CPT | Performed by: INTERNAL MEDICINE

## 2018-05-10 PROCEDURE — 36415 COLL VENOUS BLD VENIPUNCTURE: CPT

## 2018-05-10 RX ORDER — SODIUM CHLORIDE 9 MG/ML
250 INJECTION, SOLUTION INTRAVENOUS ONCE
Status: CANCELLED | OUTPATIENT
Start: 2018-05-17

## 2018-05-15 ENCOUNTER — SPECIALTY PHARMACY (OUTPATIENT)
Dept: ONCOLOGY | Facility: HOSPITAL | Age: 78
End: 2018-05-15

## 2018-05-15 DIAGNOSIS — C90.00 MULTIPLE MYELOMA NOT HAVING ACHIEVED REMISSION (HCC): Primary | ICD-10-CM

## 2018-05-15 RX ORDER — LENALIDOMIDE 15 MG/1
CAPSULE ORAL
Qty: 14 CAPSULE | Refills: 0 | Status: SHIPPED | OUTPATIENT
Start: 2018-05-15 | End: 2018-06-28

## 2018-05-17 ENCOUNTER — INFUSION (OUTPATIENT)
Dept: ONCOLOGY | Facility: HOSPITAL | Age: 78
End: 2018-05-17

## 2018-05-17 VITALS
TEMPERATURE: 97.3 F | HEIGHT: 73 IN | HEART RATE: 66 BPM | RESPIRATION RATE: 16 BRPM | SYSTOLIC BLOOD PRESSURE: 132 MMHG | BODY MASS INDEX: 23.19 KG/M2 | WEIGHT: 175 LBS | DIASTOLIC BLOOD PRESSURE: 60 MMHG

## 2018-05-17 DIAGNOSIS — C90.00 MULTIPLE MYELOMA NOT HAVING ACHIEVED REMISSION (HCC): Primary | ICD-10-CM

## 2018-05-17 LAB
CREAT BLDA-MCNC: 0.8 MG/DL (ref 0.6–1.3)
ERYTHROCYTE [DISTWIDTH] IN BLOOD BY AUTOMATED COUNT: 17.3 % (ref 11.3–14.5)
HCT VFR BLD AUTO: 35.8 % (ref 38.9–50.9)
HGB BLD-MCNC: 11.6 G/DL (ref 13.1–17.5)
LYMPHOCYTES # BLD AUTO: 0.8 10*3/MM3 (ref 0.6–4.8)
LYMPHOCYTES NFR BLD AUTO: 21.6 % (ref 24–44)
MCH RBC QN AUTO: 34.1 PG (ref 27–31)
MCHC RBC AUTO-ENTMCNC: 32.4 G/DL (ref 32–36)
MCV RBC AUTO: 105.3 FL (ref 80–99)
MONOCYTES # BLD AUTO: 0.2 10*3/MM3 (ref 0–1)
MONOCYTES NFR BLD AUTO: 4.2 % (ref 0–12)
NEUTROPHILS # BLD AUTO: 2.7 10*3/MM3 (ref 1.5–8.3)
NEUTROPHILS NFR BLD AUTO: 74.2 % (ref 41–71)
PLATELET # BLD AUTO: 104 10*3/MM3 (ref 150–450)
PMV BLD AUTO: 8.3 FL (ref 6–12)
RBC # BLD AUTO: 3.4 10*6/MM3 (ref 4.2–5.76)
WBC NRBC COR # BLD: 3.6 10*3/MM3 (ref 3.5–10.8)

## 2018-05-17 PROCEDURE — 96374 THER/PROPH/DIAG INJ IV PUSH: CPT

## 2018-05-17 PROCEDURE — 36415 COLL VENOUS BLD VENIPUNCTURE: CPT

## 2018-05-17 PROCEDURE — 25010000002 ZOLEDRONIC ACID PER 1 MG: Performed by: INTERNAL MEDICINE

## 2018-05-17 PROCEDURE — 82565 ASSAY OF CREATININE: CPT

## 2018-05-17 PROCEDURE — 85025 COMPLETE CBC W/AUTO DIFF WBC: CPT

## 2018-05-17 RX ORDER — SODIUM CHLORIDE 9 MG/ML
250 INJECTION, SOLUTION INTRAVENOUS ONCE
Status: DISCONTINUED | OUTPATIENT
Start: 2018-05-17 | End: 2018-05-17 | Stop reason: HOSPADM

## 2018-05-17 RX ADMIN — ZOLEDRONIC ACID 4 MG: 4 INJECTION, SOLUTION, CONCENTRATE INTRAVENOUS at 12:02

## 2018-05-23 ENCOUNTER — OFFICE VISIT (OUTPATIENT)
Dept: ONCOLOGY | Facility: CLINIC | Age: 78
End: 2018-05-23

## 2018-05-23 ENCOUNTER — LAB (OUTPATIENT)
Dept: LAB | Facility: HOSPITAL | Age: 78
End: 2018-05-23

## 2018-05-23 VITALS
HEIGHT: 73 IN | WEIGHT: 175.3 LBS | TEMPERATURE: 97.8 F | RESPIRATION RATE: 16 BRPM | OXYGEN SATURATION: 98 % | BODY MASS INDEX: 23.23 KG/M2 | HEART RATE: 56 BPM | SYSTOLIC BLOOD PRESSURE: 121 MMHG | DIASTOLIC BLOOD PRESSURE: 62 MMHG

## 2018-05-23 DIAGNOSIS — C90.30 PLASMACYTOMA (HCC): ICD-10-CM

## 2018-05-23 DIAGNOSIS — C90.01 MULTIPLE MYELOMA IN REMISSION (HCC): Primary | ICD-10-CM

## 2018-05-23 DIAGNOSIS — C90.01 MULTIPLE MYELOMA IN REMISSION (HCC): ICD-10-CM

## 2018-05-23 LAB
ALBUMIN SERPL-MCNC: 4 G/DL (ref 3.2–4.8)
ALBUMIN/GLOB SERPL: 1.9 G/DL (ref 1.5–2.5)
ALP SERPL-CCNC: 54 U/L (ref 25–100)
ALT SERPL W P-5'-P-CCNC: 21 U/L (ref 7–40)
ANION GAP SERPL CALCULATED.3IONS-SCNC: 4 MMOL/L (ref 3–11)
AST SERPL-CCNC: 21 U/L (ref 0–33)
BILIRUB SERPL-MCNC: 0.6 MG/DL (ref 0.3–1.2)
BUN BLD-MCNC: 19 MG/DL (ref 9–23)
BUN/CREAT SERPL: 23.8 (ref 7–25)
CALCIUM SPEC-SCNC: 9.1 MG/DL (ref 8.7–10.4)
CHLORIDE SERPL-SCNC: 106 MMOL/L (ref 99–109)
CO2 SERPL-SCNC: 29 MMOL/L (ref 20–31)
CREAT BLD-MCNC: 0.8 MG/DL (ref 0.6–1.3)
CRP SERPL-MCNC: 0.1 MG/DL (ref 0–1)
ERYTHROCYTE [DISTWIDTH] IN BLOOD BY AUTOMATED COUNT: 16.6 % (ref 11.3–14.5)
ERYTHROCYTE [SEDIMENTATION RATE] IN BLOOD: 11 MM/HR (ref 0–20)
FERRITIN SERPL-MCNC: 1471 NG/ML (ref 22–322)
GFR SERPL CREATININE-BSD FRML MDRD: 93 ML/MIN/1.73
GLOBULIN UR ELPH-MCNC: 2.1 GM/DL
GLUCOSE BLD-MCNC: 240 MG/DL (ref 70–100)
HCT VFR BLD AUTO: 35.1 % (ref 38.9–50.9)
HGB BLD-MCNC: 11.3 G/DL (ref 13.1–17.5)
IRON 24H UR-MRATE: 148 MCG/DL (ref 50–175)
IRON SATN MFR SERPL: 55 % (ref 20–50)
LYMPHOCYTES # BLD AUTO: 1.3 10*3/MM3 (ref 0.6–4.8)
LYMPHOCYTES NFR BLD AUTO: 46.7 % (ref 24–44)
MAGNESIUM SERPL-MCNC: 2.1 MG/DL (ref 1.3–2.7)
MCH RBC QN AUTO: 34 PG (ref 27–31)
MCHC RBC AUTO-ENTMCNC: 32.2 G/DL (ref 32–36)
MCV RBC AUTO: 105.7 FL (ref 80–99)
MONOCYTES # BLD AUTO: 0.3 10*3/MM3 (ref 0–1)
MONOCYTES NFR BLD AUTO: 11.7 % (ref 0–12)
NEUTROPHILS # BLD AUTO: 1.1 10*3/MM3 (ref 1.5–8.3)
NEUTROPHILS NFR BLD AUTO: 41.6 % (ref 41–71)
PHOSPHATE SERPL-MCNC: 2.8 MG/DL (ref 2.4–5.1)
PLATELET # BLD AUTO: 96 10*3/MM3 (ref 150–450)
PMV BLD AUTO: 7.2 FL (ref 6–12)
POTASSIUM BLD-SCNC: 4.3 MMOL/L (ref 3.5–5.5)
PROT SERPL-MCNC: 6.1 G/DL (ref 5.7–8.2)
RBC # BLD AUTO: 3.32 10*6/MM3 (ref 4.2–5.76)
SODIUM BLD-SCNC: 139 MMOL/L (ref 132–146)
TIBC SERPL-MCNC: 270 MCG/DL (ref 250–450)
WBC NRBC COR # BLD: 2.7 10*3/MM3 (ref 3.5–10.8)

## 2018-05-23 PROCEDURE — 83540 ASSAY OF IRON: CPT

## 2018-05-23 PROCEDURE — 83883 ASSAY NEPHELOMETRY NOT SPEC: CPT

## 2018-05-23 PROCEDURE — 83550 IRON BINDING TEST: CPT

## 2018-05-23 PROCEDURE — 85025 COMPLETE CBC W/AUTO DIFF WBC: CPT

## 2018-05-23 PROCEDURE — 86334 IMMUNOFIX E-PHORESIS SERUM: CPT

## 2018-05-23 PROCEDURE — 83735 ASSAY OF MAGNESIUM: CPT

## 2018-05-23 PROCEDURE — 86140 C-REACTIVE PROTEIN: CPT

## 2018-05-23 PROCEDURE — 84100 ASSAY OF PHOSPHORUS: CPT

## 2018-05-23 PROCEDURE — 99214 OFFICE O/P EST MOD 30 MIN: CPT | Performed by: INTERNAL MEDICINE

## 2018-05-23 PROCEDURE — 84165 PROTEIN E-PHORESIS SERUM: CPT

## 2018-05-23 PROCEDURE — 82728 ASSAY OF FERRITIN: CPT

## 2018-05-23 PROCEDURE — 82784 ASSAY IGA/IGD/IGG/IGM EACH: CPT

## 2018-05-23 PROCEDURE — 82232 ASSAY OF BETA-2 PROTEIN: CPT

## 2018-05-23 PROCEDURE — 80053 COMPREHEN METABOLIC PANEL: CPT

## 2018-05-23 PROCEDURE — 36415 COLL VENOUS BLD VENIPUNCTURE: CPT

## 2018-05-23 NOTE — PROGRESS NOTES
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  Prolonged anemia associated with RVD chemotherapy-held for probably 3 months.  Complete response by reassessment bone marrow and other labs January 2018    PROBLEM LIST   Patient Active Problem List   Diagnosis   • CAD (coronary artery disease)   • Dyslipidemia   • Right hip pain   • Arthritis   • Cellulitis of left lower extremity   • Left carotid bruit   • Plasmacytoma   • Brain cancer   • Neck pain   • Anemia   • Multiple myeloma       HISTORY OF PRESENT ILLNESS:   Overall feels well.  However since he restarted his Revlimid he's really had a decline in his appetite.  He's lost a few pounds.  No other issues to speak of.    Past Medical History, Past Surgical History, Social History, Family History have been reviewed and are without significant changes except as mentioned.      Medications:      Current Outpatient Prescriptions:   •  allopurinol (ZYLOPRIM) 300 MG tablet, Take 1 tablet by mouth Daily., Disp: 30 tablet, Rfl: 11  •  aspirin 81 MG tablet, Take  by mouth daily., Disp: , Rfl:   •  atorvastatin (LIPITOR) 20 MG tablet, Take  by mouth. 1 QHS, Disp: , Rfl:   •  dexamethasone (DECADRON) 4 MG tablet, Take 10 tablets by mouth Daily With Breakfast. Take with food on Days 1, 8, 15., Disp: 30 tablet, Rfl: 6  •  dexamethasone (DECADRON) 4 MG tablet, Take 10 tablets by mouth Daily With Breakfast. Take with food on Days 1, 8, 15 and 22., Disp: 40 tablet, Rfl: 3  •  glimepiride (AMARYL) 2 MG tablet, Take  by mouth daily., Disp: , Rfl:   •  latanoprost (XALATAN) 0.005 % ophthalmic solution, Apply  to eye. qhs, Disp: , Rfl:   •  lenalidomide (REVLIMID) 15 MG capsule, Take daily on days 1-14, then off 14 days Adult Male [id number], Disp: 21 capsule, Rfl: 0  •  lenalidomide (REVLIMID) 15 MG capsule, Take daily on days 1-14, then off 14 days Adult Male [id number], Disp: 14 capsule, Rfl: 0  •  lenalidomide (REVLIMID) 15 MG capsule, Take daily on days  "1-14, then off 14 days Adult Male [id number], Disp: 14 capsule, Rfl: 0  •  metoprolol succinate XL (TOPROL-XL) 25 MG 24 hr tablet, TAKE 1 TABLET BY MOUTH  DAILY, Disp: 90 tablet, Rfl: 2  •  nitroglycerin (NITROSTAT) 0.4 MG SL tablet, Place 1 tablet under the tongue every 5 (five) minutes as needed for chest pain., Disp: 25 tablet, Rfl: 6  •  ondansetron (ZOFRAN) 8 MG tablet, Take 1 tablet by mouth 3 (Three) Times a Day As Needed for Nausea or Vomiting., Disp: 30 tablet, Rfl: 5  •  sulfamethoxazole-trimethoprim (BACTRIM DS) 800-160 MG per tablet, 1 tab PO 3x/week on M-W-F, Disp: 12 tablet, Rfl: 5  •  ZETIA 10 MG tablet, TAKE 1 TABLET BY MOUTH AT  BEDTIME, Disp: 90 tablet, Rfl: 3  •  acyclovir (ZOVIRAX) 400 MG tablet, Take 1 tablet by mouth 2 (Two) Times a Day. Take no more than 5 doses a day., Disp: 60 tablet, Rfl: 11  No current facility-administered medications for this visit.     Facility-Administered Medications Ordered in Other Visits:   •  sodium chloride 0.9 % infusion 250 mL, 250 mL, Intravenous, PRN, Inocencia Felipe MD  •  sodium chloride 0.9 % infusion 250 mL, 250 mL, Intravenous, Once, Naresh Turner MD    ALLERGIES:    Allergies   Allergen Reactions   • Penicillins Other (See Comments)     \"broke out in sweat\"   • Simvastatin Myalgia     Swelling and pain in shoulder   • Voltaren [Diclofenac Sodium]        ROS:  Review of Systems   Constitutional: Negative for activity change and appetite change.        Overall vigor is good   HENT: Negative for mouth sores, sinus pressure and voice change.    Eyes: Negative for visual disturbance.   Respiratory: Negative for shortness of breath.    Cardiovascular: Negative for chest pain.   Gastrointestinal: Negative for abdominal pain and vomiting.   Genitourinary: Negative for dysuria.   Musculoskeletal: Negative for arthralgias and myalgias.        Osteoarthritic discomfort at baseline-mild to moderate   Skin: Negative for color change.   Neurological: " "Negative for dizziness, syncope and headaches.   Hematological: Negative for adenopathy.   Psychiatric/Behavioral: Negative for confusion, sleep disturbance and suicidal ideas. The patient is not nervous/anxious.            Objective:    /62   Pulse 56   Temp 97.8 °F (36.6 °C) (Temporal Artery )   Resp 16   Ht 185.4 cm (72.99\")   Wt 79.5 kg (175 lb 4.8 oz)   SpO2 98%   BMI 23.13 kg/m²     Physical Exam   Constitutional: He is oriented to person, place, and time. He appears well-developed and well-nourished. No distress.   Patient appears perhaps a little thin but absolutely healthy.   HENT:   Head: Normocephalic.   Mouth/Throat: Oropharynx is clear and moist.   Eyes: Conjunctivae and EOM are normal. No scleral icterus.   Neck: Normal range of motion. Neck supple. No thyromegaly present.   Cardiovascular: Normal rate, regular rhythm and normal heart sounds.    No murmur heard.  Pulmonary/Chest: Effort normal and breath sounds normal. He has no wheezes. He has no rales.   Abdominal: Soft. Bowel sounds are normal. He exhibits no distension and no mass. There is no tenderness.   Musculoskeletal: He exhibits no edema or tenderness.   Lymphadenopathy:     He has no cervical adenopathy.   Neurological: He is alert and oriented to person, place, and time. He displays normal reflexes. No cranial nerve deficit.   Skin: Skin is warm and dry. No rash noted.   Psychiatric: He has a normal mood and affect. Judgment normal.   Vitals reviewed.             RECENT LABS:  Hematology WBC   Date Value Ref Range Status   05/23/2018 2.70 (L) 3.50 - 10.80 10*3/mm3 Final     Hemoglobin   Date Value Ref Range Status   05/23/2018 11.3 (L) 13.1 - 17.5 g/dL Final     Hematocrit   Date Value Ref Range Status   05/23/2018 35.1 (L) 38.9 - 50.9 % Final     MCV   Date Value Ref Range Status   05/23/2018 105.7 (H) 80.0 - 99.0 fL Final     RDW   Date Value Ref Range Status   05/23/2018 16.6 (H) 11.3 - 14.5 % Final     MPV   Date Value Ref " Range Status   05/23/2018 7.2 6.0 - 12.0 fL Final     Platelets   Date Value Ref Range Status   05/23/2018 96 (L) 150 - 450 10*3/mm3 Final     Immature Grans %   Date Value Ref Range Status   01/09/2018 0.0 0.0 - 0.6 % Final     Neutrophils, Absolute   Date Value Ref Range Status   05/23/2018 1.10 (L) 1.50 - 8.30 10*3/mm3 Final     Lymphocytes, Absolute   Date Value Ref Range Status   05/23/2018 1.30 0.60 - 4.80 10*3/mm3 Final     Monocytes, Absolute   Date Value Ref Range Status   05/23/2018 0.30 0.00 - 1.00 10*3/mm3 Final     Eosinophils, Absolute   Date Value Ref Range Status   01/09/2018 0.01 0.00 - 0.30 10*3/mm3 Final     Basophils, Absolute   Date Value Ref Range Status   01/09/2018 0.00 0.00 - 0.20 10*3/mm3 Final     Immature Grans, Absolute   Date Value Ref Range Status   01/09/2018 0.00 0.00 - 0.03 10*3/mm3 Final     nRBC   Date Value Ref Range Status   03/19/2015 0.0  Final       Glucose   Date Value Ref Range Status   04/19/2018 191 (H) 70 - 100 mg/dL Final     Sodium   Date Value Ref Range Status   04/19/2018 136 132 - 146 mmol/L Final     Potassium   Date Value Ref Range Status   04/19/2018 4.3 3.5 - 5.5 mmol/L Final     CO2   Date Value Ref Range Status   04/19/2018 29.0 20.0 - 31.0 mmol/L Final     Chloride   Date Value Ref Range Status   04/19/2018 103 99 - 109 mmol/L Final     Anion Gap   Date Value Ref Range Status   04/19/2018 4.0 3.0 - 11.0 mmol/L Final     Creatinine   Date Value Ref Range Status   05/17/2018 0.80 0.60 - 1.30 mg/dL Final     Comment:     Serial Number: [id number]:  [id number]     BUN   Date Value Ref Range Status   04/19/2018 20 9 - 23 mg/dL Final     BUN/Creatinine Ratio   Date Value Ref Range Status   04/19/2018 25.0 7.0 - 25.0 Final     Calcium   Date Value Ref Range Status   04/19/2018 9.1 8.7 - 10.4 mg/dL Final     eGFR Non  Amer   Date Value Ref Range Status   04/19/2018 93 >60 mL/min/1.73 Final     Alkaline Phosphatase   Date Value Ref Range Status   04/19/2018 52  25 - 100 U/L Final     Total Protein   Date Value Ref Range Status   04/19/2018 6.1 5.7 - 8.2 g/dL Final     ALT (SGPT)   Date Value Ref Range Status   04/19/2018 23 7 - 40 U/L Final     AST (SGOT)   Date Value Ref Range Status   04/19/2018 19 0 - 33 U/L Final     Total Bilirubin   Date Value Ref Range Status   04/19/2018 0.6 0.3 - 1.2 mg/dL Final     Albumin   Date Value Ref Range Status   04/19/2018 4.00 3.20 - 4.80 g/dL Final     Globulin   Date Value Ref Range Status   04/19/2018 2.1 gm/dL Final     A/G Ratio   Date Value Ref Range Status   04/19/2018 1.9 1.5 - 2.5 g/dL Final          Perf Status:0    Assessment/Plan    Diagnoses and all orders for this visit:    Multiple myeloma in remission      From what I can tell in terms of his labs at hospitals, the patient continues to [facility].  I'm very happy for him.    In terms of his weight loss I like him to stop his Revlimid at the present and I'll see him back in several weeks.  He'll continue his dexamethasone once a week.    At some point in July I will restage him with a bone marrow and other labs      Naresh Turner MD , 5/23/2018    CC:

## 2018-05-24 LAB
ALBUMIN SERPL-MCNC: 3.4 G/DL (ref 2.9–4.4)
ALBUMIN/GLOB SERPL: 1.4 {RATIO} (ref 0.7–1.7)
ALPHA1 GLOB FLD ELPH-MCNC: 0.3 G/DL (ref 0–0.4)
ALPHA2 GLOB SERPL ELPH-MCNC: 0.7 G/DL (ref 0.4–1)
B-GLOBULIN SERPL ELPH-MCNC: 0.9 G/DL (ref 0.7–1.3)
GAMMA GLOB SERPL ELPH-MCNC: 0.6 G/DL (ref 0.4–1.8)
GLOBULIN SER CALC-MCNC: 2.5 G/DL (ref 2.2–3.9)
IGA SERPL-MCNC: 89 MG/DL (ref 61–437)
IGG SERPL-MCNC: 603 MG/DL (ref 700–1600)
IGM SERPL-MCNC: 69 MG/DL (ref 15–143)
INTERPRETATION SERPL IEP-IMP: ABNORMAL
KAPPA LC SERPL-MCNC: 14.7 MG/L (ref 3.3–19.4)
KAPPA LC/LAMBDA SER: 0.89 {RATIO} (ref 0.26–1.65)
LAMBDA LC FREE SERPL-MCNC: 16.5 MG/L (ref 5.7–26.3)
Lab: ABNORMAL
M-SPIKE: ABNORMAL G/DL
PROT SERPL-MCNC: 5.9 G/DL (ref 6–8.5)

## 2018-05-25 LAB — B2 MICROGLOB SERPL-MCNC: 1.5 MG/L (ref 0.6–2.4)

## 2018-06-05 ENCOUNTER — OFFICE VISIT (OUTPATIENT)
Dept: ONCOLOGY | Facility: CLINIC | Age: 78
End: 2018-06-05

## 2018-06-05 ENCOUNTER — LAB (OUTPATIENT)
Dept: LAB | Facility: HOSPITAL | Age: 78
End: 2018-06-05

## 2018-06-05 VITALS
SYSTOLIC BLOOD PRESSURE: 123 MMHG | TEMPERATURE: 98.3 F | DIASTOLIC BLOOD PRESSURE: 72 MMHG | BODY MASS INDEX: 22.8 KG/M2 | RESPIRATION RATE: 16 BRPM | WEIGHT: 172 LBS | HEIGHT: 73 IN | HEART RATE: 73 BPM

## 2018-06-05 DIAGNOSIS — C90.01 MULTIPLE MYELOMA IN REMISSION (HCC): Primary | ICD-10-CM

## 2018-06-05 DIAGNOSIS — C90.01 MULTIPLE MYELOMA IN REMISSION (HCC): ICD-10-CM

## 2018-06-05 LAB
ALBUMIN SERPL-MCNC: 4.15 G/DL (ref 3.2–4.8)
ALBUMIN/GLOB SERPL: 1.9 G/DL (ref 1.5–2.5)
ALP SERPL-CCNC: 49 U/L (ref 25–100)
ALT SERPL W P-5'-P-CCNC: 17 U/L (ref 7–40)
ANION GAP SERPL CALCULATED.3IONS-SCNC: 8 MMOL/L (ref 3–11)
AST SERPL-CCNC: 16 U/L (ref 0–33)
BILIRUB SERPL-MCNC: 0.8 MG/DL (ref 0.3–1.2)
BUN BLD-MCNC: 23 MG/DL (ref 9–23)
BUN/CREAT SERPL: 21.3 (ref 7–25)
CALCIUM SPEC-SCNC: 8.9 MG/DL (ref 8.7–10.4)
CHLORIDE SERPL-SCNC: 104 MMOL/L (ref 99–109)
CO2 SERPL-SCNC: 28 MMOL/L (ref 20–31)
CREAT BLD-MCNC: 1.08 MG/DL (ref 0.6–1.3)
CRP SERPL-MCNC: 0.96 MG/DL (ref 0–1)
ERYTHROCYTE [DISTWIDTH] IN BLOOD BY AUTOMATED COUNT: 15.8 % (ref 11.3–14.5)
ERYTHROCYTE [SEDIMENTATION RATE] IN BLOOD: 19 MM/HR (ref 0–20)
GFR SERPL CREATININE-BSD FRML MDRD: 66 ML/MIN/1.73
GLOBULIN UR ELPH-MCNC: 2.2 GM/DL
GLUCOSE BLD-MCNC: 224 MG/DL (ref 70–100)
HCT VFR BLD AUTO: 35.5 % (ref 38.9–50.9)
HGB BLD-MCNC: 11.5 G/DL (ref 13.1–17.5)
LYMPHOCYTES # BLD AUTO: 1.6 10*3/MM3 (ref 0.6–4.8)
LYMPHOCYTES NFR BLD AUTO: 45.9 % (ref 24–44)
MAGNESIUM SERPL-MCNC: 2.4 MG/DL (ref 1.3–2.7)
MCH RBC QN AUTO: 34.1 PG (ref 27–31)
MCHC RBC AUTO-ENTMCNC: 32.5 G/DL (ref 32–36)
MCV RBC AUTO: 105 FL (ref 80–99)
MONOCYTES # BLD AUTO: 0.2 10*3/MM3 (ref 0–1)
MONOCYTES NFR BLD AUTO: 7.2 % (ref 0–12)
NEUTROPHILS # BLD AUTO: 1.6 10*3/MM3 (ref 1.5–8.3)
NEUTROPHILS NFR BLD AUTO: 46.9 % (ref 41–71)
PHOSPHATE SERPL-MCNC: 3 MG/DL (ref 2.4–5.1)
PLATELET # BLD AUTO: 120 10*3/MM3 (ref 150–450)
PMV BLD AUTO: 7.9 FL (ref 6–12)
POTASSIUM BLD-SCNC: 4.5 MMOL/L (ref 3.5–5.5)
PROT SERPL-MCNC: 6.3 G/DL (ref 5.7–8.2)
RBC # BLD AUTO: 3.38 10*6/MM3 (ref 4.2–5.76)
SODIUM BLD-SCNC: 140 MMOL/L (ref 132–146)
URATE SERPL-MCNC: 2.8 MG/DL (ref 3.7–9.2)
WBC NRBC COR # BLD: 3.4 10*3/MM3 (ref 3.5–10.8)

## 2018-06-05 PROCEDURE — 99214 OFFICE O/P EST MOD 30 MIN: CPT | Performed by: INTERNAL MEDICINE

## 2018-06-05 PROCEDURE — 84100 ASSAY OF PHOSPHORUS: CPT

## 2018-06-05 PROCEDURE — 82232 ASSAY OF BETA-2 PROTEIN: CPT

## 2018-06-05 PROCEDURE — 82784 ASSAY IGA/IGD/IGG/IGM EACH: CPT

## 2018-06-05 PROCEDURE — 83883 ASSAY NEPHELOMETRY NOT SPEC: CPT

## 2018-06-05 PROCEDURE — 36415 COLL VENOUS BLD VENIPUNCTURE: CPT

## 2018-06-05 PROCEDURE — 80053 COMPREHEN METABOLIC PANEL: CPT

## 2018-06-05 PROCEDURE — 85025 COMPLETE CBC W/AUTO DIFF WBC: CPT

## 2018-06-05 PROCEDURE — 84550 ASSAY OF BLOOD/URIC ACID: CPT

## 2018-06-05 PROCEDURE — 86334 IMMUNOFIX E-PHORESIS SERUM: CPT

## 2018-06-05 PROCEDURE — 86140 C-REACTIVE PROTEIN: CPT

## 2018-06-05 PROCEDURE — 84165 PROTEIN E-PHORESIS SERUM: CPT

## 2018-06-05 PROCEDURE — 83735 ASSAY OF MAGNESIUM: CPT

## 2018-06-05 RX ORDER — LIDOCAINE HYDROCHLORIDE 20 MG/ML
10 INJECTION, SOLUTION INFILTRATION; PERINEURAL ONCE
Status: CANCELLED | OUTPATIENT
Start: 2018-07-09

## 2018-06-05 RX ORDER — MIDAZOLAM HYDROCHLORIDE 1 MG/ML
0.5 INJECTION INTRAMUSCULAR; INTRAVENOUS
Status: CANCELLED | OUTPATIENT
Start: 2018-07-09

## 2018-06-05 RX ORDER — SODIUM CHLORIDE 9 MG/ML
500 INJECTION, SOLUTION INTRAVENOUS ONCE
Status: CANCELLED | OUTPATIENT
Start: 2018-07-09

## 2018-06-05 NOTE — PROGRESS NOTES
Chief Complaint   Follow-up IgG multiple myeloma    PROBLEM LIST   Patient Active Problem List   Diagnosis   • CAD (coronary artery disease)   • Dyslipidemia   • Right hip pain   • Arthritis   • Cellulitis of left lower extremity   • Left carotid bruit   • Plasmacytoma   • Brain cancer   • Neck pain   • Anemia   • Multiple myeloma       HISTORY OF PRESENT ILLNESS:   Major problem is continued loss of appetite.  This did not improve after I had him stop his Revlimid 2 weeks ago.  He's had no fevers or chills.  He's had no new neurologic or bony symptoms.  He is taking his oral diabetic medication that was changed several weeks back.  Apparently he had a glucose level of greater than 200, which was measured on a Friday which is typically the day after each once weekly dexamethasone pulse dose.  Marcin loves to eat and he is pretty miserable without being able to eat as much as he did like.    Past Medical History, Past Surgical History, Social History, Family History have been reviewed and are without significant changes except as mentioned.      Medications:      Current Outpatient Prescriptions:   •  acyclovir (ZOVIRAX) 400 MG tablet, Take 1 tablet by mouth 2 (Two) Times a Day. Take no more than 5 doses a day., Disp: 60 tablet, Rfl: 11  •  allopurinol (ZYLOPRIM) 300 MG tablet, Take 1 tablet by mouth Daily., Disp: 30 tablet, Rfl: 11  •  aspirin 81 MG tablet, Take  by mouth daily., Disp: , Rfl:   •  atorvastatin (LIPITOR) 20 MG tablet, Take  by mouth. 1 QHS, Disp: , Rfl:   •  dexamethasone (DECADRON) 4 MG tablet, Take 10 tablets by mouth Daily With Breakfast. Take with food on Days 1, 8, 15., Disp: 30 tablet, Rfl: 6  •  dexamethasone (DECADRON) 4 MG tablet, Take 10 tablets by mouth Daily With Breakfast. Take with food on Days 1, 8, 15 and 22., Disp: 40 tablet, Rfl: 3  •  glimepiride (AMARYL) 2 MG tablet, Take  by mouth daily., Disp: , Rfl:   •  latanoprost (XALATAN) 0.005 % ophthalmic solution, Apply  to eye. qhs, Disp:  ", Rfl:   •  lenalidomide (REVLIMID) 15 MG capsule, Take daily on days 1-14, then off 14 days Adult Male [id number], Disp: 21 capsule, Rfl: 0  •  lenalidomide (REVLIMID) 15 MG capsule, Take daily on days 1-14, then off 14 days Adult Male [id number], Disp: 14 capsule, Rfl: 0  •  lenalidomide (REVLIMID) 15 MG capsule, Take daily on days 1-14, then off 14 days Adult Male [id number], Disp: 14 capsule, Rfl: 0  •  metoprolol succinate XL (TOPROL-XL) 25 MG 24 hr tablet, TAKE 1 TABLET BY MOUTH  DAILY, Disp: 90 tablet, Rfl: 2  •  nitroglycerin (NITROSTAT) 0.4 MG SL tablet, Place 1 tablet under the tongue every 5 (five) minutes as needed for chest pain., Disp: 25 tablet, Rfl: 6  •  ondansetron (ZOFRAN) 8 MG tablet, Take 1 tablet by mouth 3 (Three) Times a Day As Needed for Nausea or Vomiting., Disp: 30 tablet, Rfl: 5  •  sulfamethoxazole-trimethoprim (BACTRIM DS) 800-160 MG per tablet, 1 tab PO 3x/week on M-W-F, Disp: 12 tablet, Rfl: 5  •  ZETIA 10 MG tablet, TAKE 1 TABLET BY MOUTH AT  BEDTIME, Disp: 90 tablet, Rfl: 3  No current facility-administered medications for this visit.     Facility-Administered Medications Ordered in Other Visits:   •  sodium chloride 0.9 % infusion 250 mL, 250 mL, Intravenous, PRN, Inocencia Felipe MD  •  sodium chloride 0.9 % infusion 250 mL, 250 mL, Intravenous, Once, Naresh Turner MD    ALLERGIES:    Allergies   Allergen Reactions   • Penicillins Other (See Comments)     \"broke out in sweat\"   • Simvastatin Myalgia     Swelling and pain in shoulder   • Voltaren [Diclofenac Sodium]        ROS:  Review of Systems   Constitutional: Negative for activity change and appetite change.   HENT: Negative for mouth sores, sinus pressure and voice change.    Eyes: Negative for visual disturbance.   Respiratory: Negative for shortness of breath.    Cardiovascular: Negative for chest pain.   Gastrointestinal: Negative for abdominal pain and vomiting.        Lack of appetite mentioned above.  No " "heartburn.  No nausea or vomiting.   Genitourinary: Negative for dysuria.   Musculoskeletal: Negative for arthralgias and myalgias.   Skin: Negative for color change.   Neurological: Negative for dizziness, syncope and headaches.   Hematological: Negative for adenopathy.   Psychiatric/Behavioral: Negative for confusion, sleep disturbance and suicidal ideas. The patient is not nervous/anxious.            Objective:    /72   Pulse 73   Temp 98.3 °F (36.8 °C)   Resp 16   Ht 185.4 cm (73\")   Wt 78 kg (172 lb)   BMI 22.69 kg/m²     Physical Exam   Constitutional: He is oriented to person, place, and time. He appears well-developed and well-nourished. No distress.   Slender but looks quite healthy.  In fact he looks much younger than stated age   HENT:   Head: Normocephalic.   Mouth/Throat: Oropharynx is clear and moist.   Eyes: Conjunctivae and EOM are normal. No scleral icterus.   Neck: Normal range of motion. Neck supple. No thyromegaly present.   Cardiovascular: Normal rate, regular rhythm and normal heart sounds.    No murmur heard.  Pulmonary/Chest: Effort normal and breath sounds normal. He has no wheezes. He has no rales.   Abdominal: Soft. Bowel sounds are normal. He exhibits no distension and no mass. There is no tenderness.   Musculoskeletal: He exhibits no edema or tenderness.   Lymphadenopathy:     He has no cervical adenopathy.   Neurological: He is alert and oriented to person, place, and time. He displays normal reflexes. No cranial nerve deficit.   Skin: Skin is warm and dry. No rash noted.   Psychiatric: He has a normal mood and affect. Judgment normal.   Vitals reviewed.             RECENT LABS:  Hematology WBC   Date Value Ref Range Status   06/05/2018 3.40 (L) 3.50 - 10.80 10*3/mm3 Final     Hemoglobin   Date Value Ref Range Status   06/05/2018 11.5 (L) 13.1 - 17.5 g/dL Final     Hematocrit   Date Value Ref Range Status   06/05/2018 35.5 (L) 38.9 - 50.9 % Final     MCV   Date Value Ref " Range Status   06/05/2018 105.0 (H) 80.0 - 99.0 fL Final     RDW   Date Value Ref Range Status   06/05/2018 15.8 (H) 11.3 - 14.5 % Final     MPV   Date Value Ref Range Status   06/05/2018 7.9 6.0 - 12.0 fL Final     Platelets   Date Value Ref Range Status   06/05/2018 120 (L) 150 - 450 10*3/mm3 Final     Immature Grans %   Date Value Ref Range Status   01/09/2018 0.0 0.0 - 0.6 % Final     Neutrophils, Absolute   Date Value Ref Range Status   06/05/2018 1.60 1.50 - 8.30 10*3/mm3 Final     Lymphocytes, Absolute   Date Value Ref Range Status   06/05/2018 1.60 0.60 - 4.80 10*3/mm3 Final     Monocytes, Absolute   Date Value Ref Range Status   06/05/2018 0.20 0.00 - 1.00 10*3/mm3 Final     Eosinophils, Absolute   Date Value Ref Range Status   01/09/2018 0.01 0.00 - 0.30 10*3/mm3 Final     Basophils, Absolute   Date Value Ref Range Status   01/09/2018 0.00 0.00 - 0.20 10*3/mm3 Final     Immature Grans, Absolute   Date Value Ref Range Status   01/09/2018 0.00 0.00 - 0.03 10*3/mm3 Final     nRBC   Date Value Ref Range Status   03/19/2015 0.0  Final       Glucose   Date Value Ref Range Status   05/23/2018 240 (H) 70 - 100 mg/dL Final     Sodium   Date Value Ref Range Status   05/23/2018 139 132 - 146 mmol/L Final     Potassium   Date Value Ref Range Status   05/23/2018 4.3 3.5 - 5.5 mmol/L Final     CO2   Date Value Ref Range Status   05/23/2018 29.0 20.0 - 31.0 mmol/L Final     Chloride   Date Value Ref Range Status   05/23/2018 106 99 - 109 mmol/L Final     Anion Gap   Date Value Ref Range Status   05/23/2018 4.0 3.0 - 11.0 mmol/L Final     Creatinine   Date Value Ref Range Status   05/23/2018 0.80 0.60 - 1.30 mg/dL Final   05/17/2018 0.80 0.60 - 1.30 mg/dL Final     Comment:     Serial Number: [id number]:  [id number]     BUN   Date Value Ref Range Status   05/23/2018 19 9 - 23 mg/dL Final     BUN/Creatinine Ratio   Date Value Ref Range Status   05/23/2018 23.8 7.0 - 25.0 Final     Calcium   Date Value Ref Range Status    05/23/2018 9.1 8.7 - 10.4 mg/dL Final     eGFR Non  Amer   Date Value Ref Range Status   05/23/2018 93 >60 mL/min/1.73 Final     Alkaline Phosphatase   Date Value Ref Range Status   05/23/2018 54 25 - 100 U/L Final     Total Protein   Date Value Ref Range Status   05/23/2018 6.1 5.7 - 8.2 g/dL Final     ALT (SGPT)   Date Value Ref Range Status   05/23/2018 21 7 - 40 U/L Final     AST (SGOT)   Date Value Ref Range Status   05/23/2018 21 0 - 33 U/L Final     Total Bilirubin   Date Value Ref Range Status   05/23/2018 0.6 0.3 - 1.2 mg/dL Final     Albumin   Date Value Ref Range Status   05/23/2018 4.00 3.20 - 4.80 g/dL Final   05/23/2018 3.4 2.9 - 4.4 g/dL Final     Globulin   Date Value Ref Range Status   05/23/2018 2.1 gm/dL Final     A/G Ratio   Date Value Ref Range Status   05/23/2018 1.9 1.5 - 2.5 g/dL Final   05/23/2018 1.4 0.7 - 1.7 Final          Perf Status: 0    Assessment/Plan    Diagnoses and all orders for this visit:    Multiple myeloma in remission      From my standpoint, the patient is doing well.  I'm going to re-stage him with a bone marrow along with appropriate labs in the next several weeks.    I think his lack of appetite is due to his new diabetes medication.  I will contact Dr. Gutiérrez and see if there is any way he can go back to the glyburide he was on and perhaps increase the dose or make some other maneuver.  He will restart his Revlimid.  He will continue his once weekly dexamethasone.      Naresh Turner MD , 6/5/2018    CC:

## 2018-06-06 LAB
ALBUMIN SERPL-MCNC: 3.6 G/DL (ref 2.9–4.4)
ALBUMIN/GLOB SERPL: 1.4 {RATIO} (ref 0.7–1.7)
ALPHA1 GLOB FLD ELPH-MCNC: 0.3 G/DL (ref 0–0.4)
ALPHA2 GLOB SERPL ELPH-MCNC: 0.8 G/DL (ref 0.4–1)
B-GLOBULIN SERPL ELPH-MCNC: 0.9 G/DL (ref 0.7–1.3)
GAMMA GLOB SERPL ELPH-MCNC: 0.6 G/DL (ref 0.4–1.8)
GLOBULIN SER CALC-MCNC: 2.7 G/DL (ref 2.2–3.9)
IGA SERPL-MCNC: 93 MG/DL (ref 61–437)
IGG SERPL-MCNC: 618 MG/DL (ref 700–1600)
IGM SERPL-MCNC: 63 MG/DL (ref 15–143)
INTERPRETATION SERPL IEP-IMP: ABNORMAL
KAPPA LC SERPL-MCNC: 10.1 MG/L (ref 3.3–19.4)
KAPPA LC/LAMBDA SER: 0.78 {RATIO} (ref 0.26–1.65)
LAMBDA LC FREE SERPL-MCNC: 12.9 MG/L (ref 5.7–26.3)
Lab: ABNORMAL
M-SPIKE: ABNORMAL G/DL
PROT SERPL-MCNC: 6.3 G/DL (ref 6–8.5)

## 2018-06-07 LAB — B2 MICROGLOB SERPL-MCNC: 1.9 MG/L (ref 0.6–2.4)

## 2018-06-13 DIAGNOSIS — C90.30 PLASMACYTOMA (HCC): ICD-10-CM

## 2018-06-13 DIAGNOSIS — C90.00 MULTIPLE MYELOMA NOT HAVING ACHIEVED REMISSION (HCC): ICD-10-CM

## 2018-06-13 RX ORDER — SODIUM CHLORIDE 9 MG/ML
250 INJECTION, SOLUTION INTRAVENOUS ONCE
Status: CANCELLED | OUTPATIENT
Start: 2018-06-14

## 2018-06-14 ENCOUNTER — INFUSION (OUTPATIENT)
Dept: ONCOLOGY | Facility: HOSPITAL | Age: 78
End: 2018-06-14

## 2018-06-14 ENCOUNTER — SPECIALTY PHARMACY (OUTPATIENT)
Dept: ONCOLOGY | Facility: HOSPITAL | Age: 78
End: 2018-06-14

## 2018-06-14 ENCOUNTER — TELEPHONE (OUTPATIENT)
Dept: ONCOLOGY | Facility: CLINIC | Age: 78
End: 2018-06-14

## 2018-06-14 VITALS
HEIGHT: 73 IN | RESPIRATION RATE: 18 BRPM | DIASTOLIC BLOOD PRESSURE: 63 MMHG | HEART RATE: 77 BPM | BODY MASS INDEX: 22.66 KG/M2 | SYSTOLIC BLOOD PRESSURE: 132 MMHG | TEMPERATURE: 98.2 F | WEIGHT: 171 LBS

## 2018-06-14 DIAGNOSIS — C90.30 PLASMACYTOMA (HCC): ICD-10-CM

## 2018-06-14 DIAGNOSIS — C90.00 MULTIPLE MYELOMA NOT HAVING ACHIEVED REMISSION (HCC): Primary | ICD-10-CM

## 2018-06-14 LAB
ALBUMIN SERPL-MCNC: 3.7 G/DL (ref 3.2–4.8)
ALBUMIN/GLOB SERPL: 1.9 G/DL (ref 1.5–2.5)
ALP SERPL-CCNC: 50 U/L (ref 25–100)
ALT SERPL W P-5'-P-CCNC: 15 U/L (ref 7–40)
ANION GAP SERPL CALCULATED.3IONS-SCNC: 6 MMOL/L (ref 3–11)
AST SERPL-CCNC: 15 U/L (ref 0–33)
BILIRUB SERPL-MCNC: 0.6 MG/DL (ref 0.3–1.2)
BUN BLD-MCNC: 15 MG/DL (ref 9–23)
BUN/CREAT SERPL: 14.9 (ref 7–25)
CALCIUM SPEC-SCNC: 8.7 MG/DL (ref 8.7–10.4)
CHLORIDE SERPL-SCNC: 105 MMOL/L (ref 99–109)
CO2 SERPL-SCNC: 26 MMOL/L (ref 20–31)
CREAT BLD-MCNC: 1.01 MG/DL (ref 0.6–1.3)
CREAT BLDA-MCNC: 0.9 MG/DL (ref 0.6–1.3)
ERYTHROCYTE [DISTWIDTH] IN BLOOD BY AUTOMATED COUNT: 14.8 % (ref 11.3–14.5)
GFR SERPL CREATININE-BSD FRML MDRD: 71 ML/MIN/1.73
GLOBULIN UR ELPH-MCNC: 2 GM/DL
GLUCOSE BLD-MCNC: 203 MG/DL (ref 70–100)
HCT VFR BLD AUTO: 30.1 % (ref 38.9–50.9)
HGB BLD-MCNC: 9.6 G/DL (ref 13.1–17.5)
LYMPHOCYTES # BLD AUTO: 1 10*3/MM3 (ref 0.6–4.8)
LYMPHOCYTES NFR BLD AUTO: 30.7 % (ref 24–44)
MAGNESIUM SERPL-MCNC: 2 MG/DL (ref 1.3–2.7)
MCH RBC QN AUTO: 33.4 PG (ref 27–31)
MCHC RBC AUTO-ENTMCNC: 31.9 G/DL (ref 32–36)
MCV RBC AUTO: 104.7 FL (ref 80–99)
MONOCYTES # BLD AUTO: 0.6 10*3/MM3 (ref 0–1)
MONOCYTES NFR BLD AUTO: 17.4 % (ref 0–12)
NEUTROPHILS # BLD AUTO: 1.8 10*3/MM3 (ref 1.5–8.3)
NEUTROPHILS NFR BLD AUTO: 51.9 % (ref 41–71)
PHOSPHATE SERPL-MCNC: 2.3 MG/DL (ref 2.4–5.1)
PLATELET # BLD AUTO: 80 10*3/MM3 (ref 150–450)
PMV BLD AUTO: 7.7 FL (ref 6–12)
POTASSIUM BLD-SCNC: 4.5 MMOL/L (ref 3.5–5.5)
PROT SERPL-MCNC: 5.7 G/DL (ref 5.7–8.2)
RBC # BLD AUTO: 2.87 10*6/MM3 (ref 4.2–5.76)
SODIUM BLD-SCNC: 137 MMOL/L (ref 132–146)
WBC NRBC COR # BLD: 3.4 10*3/MM3 (ref 3.5–10.8)

## 2018-06-14 PROCEDURE — 80053 COMPREHEN METABOLIC PANEL: CPT | Performed by: INTERNAL MEDICINE

## 2018-06-14 PROCEDURE — 96372 THER/PROPH/DIAG INJ SC/IM: CPT

## 2018-06-14 PROCEDURE — 96374 THER/PROPH/DIAG INJ IV PUSH: CPT

## 2018-06-14 PROCEDURE — 63510000001 EPOETIN ALFA PER 1000 UNITS: Performed by: INTERNAL MEDICINE

## 2018-06-14 PROCEDURE — 84100 ASSAY OF PHOSPHORUS: CPT | Performed by: INTERNAL MEDICINE

## 2018-06-14 PROCEDURE — 85025 COMPLETE CBC W/AUTO DIFF WBC: CPT | Performed by: INTERNAL MEDICINE

## 2018-06-14 PROCEDURE — 83735 ASSAY OF MAGNESIUM: CPT | Performed by: INTERNAL MEDICINE

## 2018-06-14 PROCEDURE — 25010000002 ZOLEDRONIC ACID PER 1 MG: Performed by: INTERNAL MEDICINE

## 2018-06-14 PROCEDURE — 82565 ASSAY OF CREATININE: CPT

## 2018-06-14 RX ADMIN — ZOLEDRONIC ACID 4 MG: 4 INJECTION, SOLUTION, CONCENTRATE INTRAVENOUS at 12:13

## 2018-06-14 RX ADMIN — ERYTHROPOIETIN 40000 UNITS: 40000 INJECTION, SOLUTION INTRAVENOUS; SUBCUTANEOUS at 12:15

## 2018-06-14 NOTE — PROGRESS NOTES
Oral Chemotherapy Teaching      Patient Name/:  Marcin Joyce   1940  Oral Chemotherapy Regimen:  Lenalidomide 15mg PO daily on days 1-14, followed by 14 days off + Dex 40mg weekly  Date Started Medication :    Cycle 1:  2018  Cycle 2: 18      Pt reports starting dexamethasone 40mg weekly on  of each week.     Initial Teaching Follow Up Comments     Safety     Storage instructions (away from children; away from heat/cold, sunlight, or moisture), handling - use of gloves (caregivers), washing hands after touching pills, managing waste     “How are you storing your medications?”, reminders on storage, proper handling (caregivers using gloves, washing hands, away from children, managing waste, etc.), disposal of medication with D/C or dosage change    Discussed appropriate handling and storage of oral chemotherapy. Advised on storing at room temp away from pets or children. Pt to wash hands after handling. Do not share medication. Advised of teratogenic nature of medication. Pt  verbalized understanding.      Adherence      patient and/or caregiver on how to take medication, take with/without food, assess their adherence potential, stress importance of adherence, ways to manage adherence (pill boxes, phone reminders, calendars), what to do if miss a dose   “How are you taking your medication?” “How are you remembering to take your medication?”, “How many doses have you missed?”, determine reasons for non-adherence (not remembering, side effects, etc), ways to improve, overadherence? Remind patient of ways to improve/maintain adherence   Pt reports Lenalidomide administered daily at same time for 14 days, followed by 14 days off. Reports currently on on week and have 5 days remaining before taking 2 weeks off. Pt reports continuing with dex 40mg weekly administered on .        Side Effects/Adverse Reactions      patient on potential side effects, s/s, ways to manage, when to  call MD/seek help     Determine if patient experiencing side effects, ways to manage  Pt with previous experience using Relvimid and Dexamethasone in the past.Reports fatigue and lethargy as primary complaint.      Miscellaneous     Food interactions, DDIs, financial issues Determine if patient started any new medications since being placed on oral chemo (analyze for DDI)      Additional Notes: Reviewed aforementioned material with patient in infusion.

## 2018-06-14 NOTE — TELEPHONE ENCOUNTER
VM left advising to return call. Per Dr Turner, patient to get CBC weekly, and procrit PRN for hgb <10.

## 2018-06-20 RX ORDER — MEGESTROL ACETATE 40 MG/ML
SUSPENSION ORAL
Qty: 480 ML | Refills: 3 | Status: SHIPPED | OUTPATIENT
Start: 2018-06-20 | End: 2018-07-03 | Stop reason: ALTCHOICE

## 2018-06-20 RX ORDER — LENALIDOMIDE 15 MG/1
15 CAPSULE ORAL DAILY
Qty: 14 CAPSULE | Refills: 0 | Status: SHIPPED | OUTPATIENT
Start: 2018-06-20 | End: 2018-06-28

## 2018-06-21 ENCOUNTER — TELEPHONE (OUTPATIENT)
Dept: ONCOLOGY | Facility: CLINIC | Age: 78
End: 2018-06-21

## 2018-06-21 ENCOUNTER — INFUSION (OUTPATIENT)
Dept: ONCOLOGY | Facility: HOSPITAL | Age: 78
End: 2018-06-21

## 2018-06-21 VITALS
RESPIRATION RATE: 16 BRPM | HEART RATE: 68 BPM | TEMPERATURE: 97.7 F | HEIGHT: 73 IN | DIASTOLIC BLOOD PRESSURE: 53 MMHG | SYSTOLIC BLOOD PRESSURE: 112 MMHG | WEIGHT: 169 LBS | BODY MASS INDEX: 22.4 KG/M2

## 2018-06-21 DIAGNOSIS — C90.30 PLASMACYTOMA (HCC): Primary | ICD-10-CM

## 2018-06-21 LAB
ERYTHROCYTE [DISTWIDTH] IN BLOOD BY AUTOMATED COUNT: 14.8 % (ref 11.3–14.5)
FERRITIN SERPL-MCNC: 1260 NG/ML (ref 22–322)
HCT VFR BLD AUTO: 30.7 % (ref 38.9–50.9)
HGB BLD-MCNC: 9.6 G/DL (ref 13.1–17.5)
IRON 24H UR-MRATE: 48 MCG/DL (ref 50–175)
IRON SATN MFR SERPL: 19 % (ref 20–50)
LYMPHOCYTES # BLD AUTO: 1.1 10*3/MM3 (ref 0.6–4.8)
LYMPHOCYTES NFR BLD AUTO: 41.9 % (ref 24–44)
MCH RBC QN AUTO: 32.2 PG (ref 27–31)
MCHC RBC AUTO-ENTMCNC: 31.2 G/DL (ref 32–36)
MCV RBC AUTO: 103 FL (ref 80–99)
MONOCYTES # BLD AUTO: 0.6 10*3/MM3 (ref 0–1)
MONOCYTES NFR BLD AUTO: 22.4 % (ref 0–12)
NEUTROPHILS # BLD AUTO: 0.9 10*3/MM3 (ref 1.5–8.3)
NEUTROPHILS NFR BLD AUTO: 35.7 % (ref 41–71)
PLATELET # BLD AUTO: 38 10*3/MM3 (ref 150–450)
PMV BLD AUTO: 9.7 FL (ref 6–12)
RBC # BLD AUTO: 2.98 10*6/MM3 (ref 4.2–5.76)
TIBC SERPL-MCNC: 248 MCG/DL (ref 250–450)
WBC NRBC COR # BLD: 2.6 10*3/MM3 (ref 3.5–10.8)

## 2018-06-21 PROCEDURE — 83550 IRON BINDING TEST: CPT | Performed by: INTERNAL MEDICINE

## 2018-06-21 PROCEDURE — 96372 THER/PROPH/DIAG INJ SC/IM: CPT

## 2018-06-21 PROCEDURE — 63510000001 EPOETIN ALFA PER 1000 UNITS: Performed by: INTERNAL MEDICINE

## 2018-06-21 PROCEDURE — 36415 COLL VENOUS BLD VENIPUNCTURE: CPT

## 2018-06-21 PROCEDURE — 83540 ASSAY OF IRON: CPT | Performed by: INTERNAL MEDICINE

## 2018-06-21 PROCEDURE — 82728 ASSAY OF FERRITIN: CPT | Performed by: INTERNAL MEDICINE

## 2018-06-21 PROCEDURE — 85025 COMPLETE CBC W/AUTO DIFF WBC: CPT | Performed by: INTERNAL MEDICINE

## 2018-06-21 RX ADMIN — ERYTHROPOIETIN 40000 UNITS: 40000 INJECTION, SOLUTION INTRAVENOUS; SUBCUTANEOUS at 13:56

## 2018-06-21 NOTE — TELEPHONE ENCOUNTER
Per Dr. Turner, patients Plt 38k.  Patient instructed to hold Revlimid but continue to take Dexamethasone as instructed.  Patient to hold Rev until appt with Dr. Turner 7/12/18.

## 2018-06-26 ENCOUNTER — TELEPHONE (OUTPATIENT)
Dept: ONCOLOGY | Facility: CLINIC | Age: 78
End: 2018-06-26

## 2018-06-26 NOTE — TELEPHONE ENCOUNTER
----- Message from Anika Santos sent at 6/25/2018  4:26 PM EDT -----  Regarding: [facility] - UPDATE WITH MEDICATIONS   Contact: [phone]  PATIENT CALLED AND SAID THE MEDICATIONS AREN'T MAKING A DIFFERENCE FOR HIM.     THEY ARE  MEGESTROL AND ACETETE

## 2018-06-26 NOTE — TELEPHONE ENCOUNTER
Per Dr. Turner, continue Megestrol until Thursday.  We will call and check status of appetite.  Patient stated understanding.

## 2018-06-27 DIAGNOSIS — C90.30 PLASMACYTOMA (HCC): ICD-10-CM

## 2018-06-28 ENCOUNTER — OFFICE VISIT (OUTPATIENT)
Dept: CARDIOLOGY | Facility: CLINIC | Age: 78
End: 2018-06-28

## 2018-06-28 ENCOUNTER — INFUSION (OUTPATIENT)
Dept: ONCOLOGY | Facility: HOSPITAL | Age: 78
End: 2018-06-28

## 2018-06-28 ENCOUNTER — HOSPITAL ENCOUNTER (OUTPATIENT)
Dept: GENERAL RADIOLOGY | Facility: HOSPITAL | Age: 78
Discharge: HOME | End: 2018-06-28
Attending: INTERNAL MEDICINE | Admitting: INTERNAL MEDICINE

## 2018-06-28 VITALS
SYSTOLIC BLOOD PRESSURE: 124 MMHG | HEIGHT: 73 IN | RESPIRATION RATE: 18 BRPM | TEMPERATURE: 97.3 F | HEART RATE: 81 BPM | DIASTOLIC BLOOD PRESSURE: 60 MMHG | BODY MASS INDEX: 22.4 KG/M2 | WEIGHT: 169 LBS

## 2018-06-28 VITALS
SYSTOLIC BLOOD PRESSURE: 124 MMHG | BODY MASS INDEX: 22.26 KG/M2 | OXYGEN SATURATION: 94 % | WEIGHT: 168 LBS | HEART RATE: 61 BPM | HEIGHT: 73 IN | DIASTOLIC BLOOD PRESSURE: 70 MMHG

## 2018-06-28 DIAGNOSIS — C90.30 PLASMACYTOMA (HCC): Primary | ICD-10-CM

## 2018-06-28 DIAGNOSIS — R05.9 COUGH: ICD-10-CM

## 2018-06-28 DIAGNOSIS — E78.2 MIXED HYPERLIPIDEMIA: ICD-10-CM

## 2018-06-28 DIAGNOSIS — I10 ESSENTIAL HYPERTENSION: ICD-10-CM

## 2018-06-28 DIAGNOSIS — I25.10 CORONARY ARTERY DISEASE INVOLVING NATIVE CORONARY ARTERY OF NATIVE HEART WITHOUT ANGINA PECTORIS: ICD-10-CM

## 2018-06-28 DIAGNOSIS — R05.9 COUGH: Primary | ICD-10-CM

## 2018-06-28 LAB
ERYTHROCYTE [DISTWIDTH] IN BLOOD BY AUTOMATED COUNT: 15.5 % (ref 11.3–14.5)
HCT VFR BLD AUTO: 27 % (ref 38.9–50.9)
HGB BLD-MCNC: 8.2 G/DL (ref 13.1–17.5)
LYMPHOCYTES # BLD AUTO: 1.9 10*3/MM3 (ref 0.6–4.8)
LYMPHOCYTES NFR BLD AUTO: 64.1 % (ref 24–44)
MCH RBC QN AUTO: 31 PG (ref 27–31)
MCHC RBC AUTO-ENTMCNC: 30.2 G/DL (ref 32–36)
MCV RBC AUTO: 102.6 FL (ref 80–99)
MONOCYTES # BLD AUTO: 0.5 10*3/MM3 (ref 0–1)
MONOCYTES NFR BLD AUTO: 17.5 % (ref 0–12)
NEUTROPHILS # BLD AUTO: 0.6 10*3/MM3 (ref 1.5–8.3)
NEUTROPHILS NFR BLD AUTO: 18.4 % (ref 41–71)
PLATELET # BLD AUTO: 34 10*3/MM3 (ref 150–450)
PMV BLD AUTO: 9.3 FL (ref 6–12)
RBC # BLD AUTO: 2.63 10*6/MM3 (ref 4.2–5.76)
WBC NRBC COR # BLD: 3 10*3/MM3 (ref 3.5–10.8)

## 2018-06-28 PROCEDURE — 96372 THER/PROPH/DIAG INJ SC/IM: CPT

## 2018-06-28 PROCEDURE — 63510000001 EPOETIN ALFA PER 1000 UNITS: Performed by: INTERNAL MEDICINE

## 2018-06-28 PROCEDURE — 99214 OFFICE O/P EST MOD 30 MIN: CPT | Performed by: INTERNAL MEDICINE

## 2018-06-28 PROCEDURE — 71046 X-RAY EXAM CHEST 2 VIEWS: CPT

## 2018-06-28 PROCEDURE — 36415 COLL VENOUS BLD VENIPUNCTURE: CPT

## 2018-06-28 PROCEDURE — 85025 COMPLETE CBC W/AUTO DIFF WBC: CPT | Performed by: INTERNAL MEDICINE

## 2018-06-28 RX ADMIN — ERYTHROPOIETIN 40000 UNITS: 40000 INJECTION, SOLUTION INTRAVENOUS; SUBCUTANEOUS at 14:18

## 2018-06-28 NOTE — PROGRESS NOTES
"[facility]  Office Progress Note  Marcin Joyce  1940  [phone]      Visit Date: 6/28/2018      PCP: Naresh Gutiérrez MD  [address] [id number]    IDENTIFICATION: A 78 y.o. male from [facility]    Chief Complaint   Patient presents with   • Coronary Artery Disease       PROBLEM LIST:   1. CAD  a. April 2005, status post PCI and stent placement, cutting balloon 2.25 x 10 mm. to the ostium and ramus intermedius and PCI and stenting 2.5 x 18 mm. Cypher drug-eluting stent to 16 atmospheres in the first diagonal.   b. June 2013, 3.0 x 18 Xience Expedition RICO to the LAD, patent stents otherwise. EF greater than 60%.   2. Dyslipidemia:  a. December 2006, total cholesterol 123, HDL 42, LDL 63, triglycerides 77, glucose 134.  b. Lipid profile June 2005: Total cholesterol 147, triglycerides 152, HDL 39, LDL 78; was taking Lipitor approximately every other day.  3. 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 Status post radiation therapy.   4. Right hip pain/arthritis.   5. Myeloma 2017- [facility]  6. Left lower extremity cellulitis, followed per Dr. Watts, 2013.   7. Surgical history:  a. Pilonidal cystectomy.   b. Left shoulder repair.   c. Meningioma excision.   d. Right arthroscopic knee surgery.   e. Lumbar disk surgery, Dr. Burns.  8.  Diverticulitis- med rx      Allergies  Allergies   Allergen Reactions   • Penicillins Other (See Comments)     \"broke out in sweat\"   • Simvastatin Myalgia     Swelling and pain in shoulder   • Voltaren [Diclofenac Sodium]        Current Medications    Current Outpatient Prescriptions:   •  allopurinol (ZYLOPRIM) 300 MG tablet, Take 1 tablet by mouth Daily., Disp: 30 tablet, Rfl: 11  •  aspirin 81 MG tablet, Take  by mouth daily., Disp: , Rfl:   •  atorvastatin (LIPITOR) 20 MG tablet, Take  by mouth. 1 QHS, Disp: , Rfl:   •  dexamethasone (DECADRON) 4 MG tablet, Take 10 tablets by mouth Daily With " "Breakfast. Take with food on Days 1, 8, 15 and 22., Disp: 40 tablet, Rfl: 3  •  glimepiride (AMARYL) 2 MG tablet, Take  by mouth daily., Disp: , Rfl:   •  latanoprost (XALATAN) 0.005 % ophthalmic solution, Apply  to eye. qhs, Disp: , Rfl:   •  lenalidomide (REVLIMID) 15 MG capsule, Take daily on days 1-14, then off 14 days Adult Male [id number], Disp: 21 capsule, Rfl: 0  •  megestrol (MEGACE) 40 MG/ML suspension, TAKE 10 cc/ml BID, Disp: 480 mL, Rfl: 3  •  metoprolol succinate XL (TOPROL-XL) 25 MG 24 hr tablet, TAKE 1 TABLET BY MOUTH  DAILY, Disp: 90 tablet, Rfl: 2  •  nitroglycerin (NITROSTAT) 0.4 MG SL tablet, Place 1 tablet under the tongue every 5 (five) minutes as needed for chest pain., Disp: 25 tablet, Rfl: 6  •  ondansetron (ZOFRAN) 8 MG tablet, Take 1 tablet by mouth 3 (Three) Times a Day As Needed for Nausea or Vomiting., Disp: 30 tablet, Rfl: 5  •  sulfamethoxazole-trimethoprim (BACTRIM DS) 800-160 MG per tablet, 1 tab PO 3x/week on M-W-F, Disp: 12 tablet, Rfl: 5  •  ZETIA 10 MG tablet, TAKE 1 TABLET BY MOUTH AT  BEDTIME, Disp: 90 tablet, Rfl: 3  No current facility-administered medications for this visit.     Facility-Administered Medications Ordered in Other Visits:   •  sodium chloride 0.9 % infusion 250 mL, 250 mL, Intravenous, PRN, Inocencia Felipe MD  •  sodium chloride 0.9 % infusion 250 mL, 250 mL, Intravenous, Once, Naresh Turner MD      History of Present Illness     PT dx w myeloma since last visit.  Intermittent procrit and now on steroids for low plts.  Productive cough over last 1 week without fever.      ROS:  All systems have been reviewed and are negative with the exception of those mentioned in the HPI.    OBJECTIVE:  Vitals:    06/28/18 1035   BP: 124/70   BP Location: Right arm   Patient Position: Sitting   Pulse: 61   SpO2: 94%   Weight: 76.2 kg (168 lb)   Height: 185.4 cm (73\")     Physical Exam   Constitutional: He is oriented to person, place, and time. He appears " well-developed and well-nourished. No distress.   Neck: Neck supple. No JVD present. No tracheal deviation present.   Cardiovascular: Normal rate, regular rhythm, normal heart sounds and intact distal pulses.    No murmur heard.  Pulmonary/Chest: Effort normal. He has no wheezes. He has rales.   Abdominal: Soft. Bowel sounds are normal. There is no tenderness. There is no guarding.   Musculoskeletal: He exhibits no edema or tenderness.   Neurological: He is alert and oriented to person, place, and time.   Nursing note and vitals reviewed.      Diagnostic Data:  Procedures      ASSESSMENT:   Diagnosis Plan   1. Cough  XR Chest 2 View       PLAN:  1. No new anginal equiv CP  2. Historically well controlled, on duel therapy with statin and zetia.  3. Well controlled  4. Pa /lat cxr in immunocompromised host.    Thrombocytopenia with most recent platelet count 140,000 in March 2017, will cont. Low dose aspirin. Following with heme-onc.    Pt counseled to call PCP for diverticulitis issues.    RTC in 1 year or sooner if needed.    Naresh Gutiérrez MD, thank you for referring Mr. Joyce for evaluation.  I have forwarded my electronically generated recommendations to you for review.  Please do not hesitate to call with any questions.    I, Asif Fontenot MD, personally performed the services described in this documentation as scribed by the above named individual in my presence, and it is both accurate and complete.  6/28/2018  10:54 AM    Asif Fontenot MD, [facility]

## 2018-07-02 DIAGNOSIS — C90.01 MULTIPLE MYELOMA IN REMISSION (HCC): ICD-10-CM

## 2018-07-02 DIAGNOSIS — D64.81 ANEMIA DUE TO ANTINEOPLASTIC CHEMOTHERAPY: Primary | ICD-10-CM

## 2018-07-02 DIAGNOSIS — T45.1X5A ANEMIA DUE TO ANTINEOPLASTIC CHEMOTHERAPY: Primary | ICD-10-CM

## 2018-07-02 RX ORDER — SODIUM CHLORIDE 9 MG/ML
250 INJECTION, SOLUTION INTRAVENOUS AS NEEDED
Status: CANCELLED | OUTPATIENT
Start: 2018-07-02

## 2018-07-02 RX ORDER — DRONABINOL 10 MG/1
10 CAPSULE ORAL
Qty: 30 CAPSULE | Refills: 1 | OUTPATIENT
Start: 2018-07-02 | End: 2019-01-01

## 2018-07-02 NOTE — TELEPHONE ENCOUNTER
Patient states starting over the weekend he has been weak and sob.  He can walk down his cleaning at home and need to stop and catch breath and rest.      Per Dr. Turner, stop taking megestrol and begin Marinol phoned into pharmacy.  Patient will get 2 units blood tomorrow at 7:30am.  Patient stated understanding to all the above.

## 2018-07-02 NOTE — TELEPHONE ENCOUNTER
----- Message from Eden Kelly MA sent at 6/29/2018  4:03 PM EDT -----  Regarding: FW: ELHAM-MEDICATION  Contact: [phone]      ----- Message -----  From: Yael Enriquez  Sent: 6/28/2018   4:28 PM  To: [facility]  Subject: ELHAM-MEDICATION                               Patient called and wants Eden to call him back about the Megastrol for the appetite he can' tell any difference, but he is going to continue to take it.

## 2018-07-03 ENCOUNTER — INFUSION (OUTPATIENT)
Dept: ONCOLOGY | Facility: HOSPITAL | Age: 78
End: 2018-07-03

## 2018-07-03 ENCOUNTER — HOSPITAL ENCOUNTER (OUTPATIENT)
Dept: CT IMAGING | Facility: HOSPITAL | Age: 78
Discharge: HOME | End: 2018-07-03
Attending: INTERNAL MEDICINE | Admitting: INTERNAL MEDICINE

## 2018-07-03 ENCOUNTER — OFFICE VISIT (OUTPATIENT)
Dept: ONCOLOGY | Facility: CLINIC | Age: 78
End: 2018-07-03

## 2018-07-03 VITALS
BODY MASS INDEX: 22.61 KG/M2 | RESPIRATION RATE: 18 BRPM | DIASTOLIC BLOOD PRESSURE: 50 MMHG | TEMPERATURE: 98.4 F | HEART RATE: 63 BPM | HEIGHT: 73 IN | WEIGHT: 170.6 LBS | SYSTOLIC BLOOD PRESSURE: 103 MMHG

## 2018-07-03 VITALS
HEIGHT: 73 IN | RESPIRATION RATE: 18 BRPM | TEMPERATURE: 98.4 F | SYSTOLIC BLOOD PRESSURE: 103 MMHG | WEIGHT: 170 LBS | DIASTOLIC BLOOD PRESSURE: 50 MMHG | BODY MASS INDEX: 22.53 KG/M2 | HEART RATE: 63 BPM

## 2018-07-03 DIAGNOSIS — C90.30 PLASMACYTOMA (HCC): ICD-10-CM

## 2018-07-03 DIAGNOSIS — C90.01 MULTIPLE MYELOMA IN REMISSION (HCC): ICD-10-CM

## 2018-07-03 DIAGNOSIS — T45.1X5A ANEMIA DUE TO ANTINEOPLASTIC CHEMOTHERAPY: Primary | ICD-10-CM

## 2018-07-03 DIAGNOSIS — C71.9 MALIGNANT NEOPLASM OF BRAIN, UNSPECIFIED LOCATION (HCC): ICD-10-CM

## 2018-07-03 DIAGNOSIS — D64.81 ANEMIA DUE TO ANTINEOPLASTIC CHEMOTHERAPY: ICD-10-CM

## 2018-07-03 DIAGNOSIS — T45.1X5A ANEMIA DUE TO ANTINEOPLASTIC CHEMOTHERAPY: ICD-10-CM

## 2018-07-03 DIAGNOSIS — C90.01 MULTIPLE MYELOMA IN REMISSION (HCC): Primary | ICD-10-CM

## 2018-07-03 DIAGNOSIS — R06.02 SOB (SHORTNESS OF BREATH): Primary | ICD-10-CM

## 2018-07-03 DIAGNOSIS — C90.00 MULTIPLE MYELOMA NOT HAVING ACHIEVED REMISSION (HCC): ICD-10-CM

## 2018-07-03 DIAGNOSIS — D64.81 ANEMIA DUE TO ANTINEOPLASTIC CHEMOTHERAPY: Primary | ICD-10-CM

## 2018-07-03 DIAGNOSIS — R06.02 SOB (SHORTNESS OF BREATH): ICD-10-CM

## 2018-07-03 LAB
ABO GROUP BLD: NORMAL
BLD GP AB SCN SERPL QL: NEGATIVE
ERYTHROCYTE [DISTWIDTH] IN BLOOD BY AUTOMATED COUNT: 15.3 % (ref 11.3–14.5)
HCT VFR BLD AUTO: 25.9 % (ref 38.9–50.9)
HGB BLD-MCNC: 8.4 G/DL (ref 13.1–17.5)
LYMPHOCYTES # BLD AUTO: 2.6 10*3/MM3 (ref 0.6–4.8)
LYMPHOCYTES NFR BLD AUTO: 54.9 % (ref 24–44)
MCH RBC QN AUTO: 32.7 PG (ref 27–31)
MCHC RBC AUTO-ENTMCNC: 32.6 G/DL (ref 32–36)
MCV RBC AUTO: 100.2 FL (ref 80–99)
MONOCYTES # BLD AUTO: 1.4 10*3/MM3 (ref 0–1)
MONOCYTES NFR BLD AUTO: 28.9 % (ref 0–12)
NEUTROPHILS # BLD AUTO: 0.8 10*3/MM3 (ref 1.5–8.3)
NEUTROPHILS NFR BLD AUTO: 16.2 % (ref 41–71)
PLATELET # BLD AUTO: 37 10*3/MM3 (ref 150–450)
PMV BLD AUTO: 9.4 FL (ref 6–12)
RBC # BLD AUTO: 2.58 10*6/MM3 (ref 4.2–5.76)
RH BLD: POSITIVE
T&S EXPIRATION DATE: NORMAL
WBC NRBC COR # BLD: 4.8 10*3/MM3 (ref 3.5–10.8)

## 2018-07-03 PROCEDURE — 86900 BLOOD TYPING SEROLOGIC ABO: CPT | Performed by: INTERNAL MEDICINE

## 2018-07-03 PROCEDURE — 0 IOPAMIDOL PER 1 ML: Performed by: INTERNAL MEDICINE

## 2018-07-03 PROCEDURE — 63710000001 DIPHENHYDRAMINE PER 50 MG: Performed by: INTERNAL MEDICINE

## 2018-07-03 PROCEDURE — A9270 NON-COVERED ITEM OR SERVICE: HCPCS | Performed by: INTERNAL MEDICINE

## 2018-07-03 PROCEDURE — 86901 BLOOD TYPING SEROLOGIC RH(D): CPT | Performed by: INTERNAL MEDICINE

## 2018-07-03 PROCEDURE — 85025 COMPLETE CBC W/AUTO DIFF WBC: CPT | Performed by: INTERNAL MEDICINE

## 2018-07-03 PROCEDURE — 86900 BLOOD TYPING SEROLOGIC ABO: CPT

## 2018-07-03 PROCEDURE — 99214 OFFICE O/P EST MOD 30 MIN: CPT | Performed by: INTERNAL MEDICINE

## 2018-07-03 PROCEDURE — P9016 RBC LEUKOCYTES REDUCED: HCPCS

## 2018-07-03 PROCEDURE — 63710000001 ACETAMINOPHEN 325 MG TABLET: Performed by: INTERNAL MEDICINE

## 2018-07-03 PROCEDURE — 86850 RBC ANTIBODY SCREEN: CPT | Performed by: INTERNAL MEDICINE

## 2018-07-03 PROCEDURE — 36430 TRANSFUSION BLD/BLD COMPNT: CPT

## 2018-07-03 PROCEDURE — 96374 THER/PROPH/DIAG INJ IV PUSH: CPT

## 2018-07-03 PROCEDURE — 25010000002 FUROSEMIDE PER 20 MG: Performed by: INTERNAL MEDICINE

## 2018-07-03 PROCEDURE — 71275 CT ANGIOGRAPHY CHEST: CPT

## 2018-07-03 PROCEDURE — 86923 COMPATIBILITY TEST ELECTRIC: CPT

## 2018-07-03 RX ORDER — FUROSEMIDE 10 MG/ML
20 INJECTION INTRAMUSCULAR; INTRAVENOUS ONCE
Status: CANCELLED | OUTPATIENT
Start: 2018-07-03 | End: 2018-07-03

## 2018-07-03 RX ORDER — ACETAMINOPHEN 325 MG/1
650 TABLET ORAL ONCE
Status: CANCELLED | OUTPATIENT
Start: 2018-07-03 | End: 2018-07-03

## 2018-07-03 RX ORDER — DIPHENHYDRAMINE HCL 25 MG
25 CAPSULE ORAL ONCE
Status: CANCELLED | OUTPATIENT
Start: 2018-07-03 | End: 2018-07-03

## 2018-07-03 RX ORDER — ACETAMINOPHEN 325 MG/1
650 TABLET ORAL ONCE
Status: COMPLETED | OUTPATIENT
Start: 2018-07-03 | End: 2018-07-03

## 2018-07-03 RX ORDER — DIPHENHYDRAMINE HCL 25 MG
25 CAPSULE ORAL ONCE
Status: COMPLETED | OUTPATIENT
Start: 2018-07-03 | End: 2018-07-03

## 2018-07-03 RX ORDER — SODIUM CHLORIDE 9 MG/ML
250 INJECTION, SOLUTION INTRAVENOUS AS NEEDED
Status: CANCELLED | OUTPATIENT
Start: 2018-07-03

## 2018-07-03 RX ORDER — FUROSEMIDE 10 MG/ML
20 INJECTION INTRAMUSCULAR; INTRAVENOUS ONCE
Status: COMPLETED | OUTPATIENT
Start: 2018-07-03 | End: 2018-07-03

## 2018-07-03 RX ORDER — SODIUM CHLORIDE 9 MG/ML
250 INJECTION, SOLUTION INTRAVENOUS AS NEEDED
Status: DISCONTINUED | OUTPATIENT
Start: 2018-07-03 | End: 2018-07-03 | Stop reason: HOSPADM

## 2018-07-03 RX ADMIN — ACETAMINOPHEN 650 MG: 325 TABLET, FILM COATED ORAL at 12:16

## 2018-07-03 RX ADMIN — DIPHENHYDRAMINE HYDROCHLORIDE 25 MG: 25 CAPSULE ORAL at 12:16

## 2018-07-03 RX ADMIN — IOPAMIDOL 70 ML: 755 INJECTION, SOLUTION INTRAVENOUS at 17:30

## 2018-07-03 RX ADMIN — SODIUM CHLORIDE 500 ML: 9 INJECTION, SOLUTION INTRAVENOUS at 12:24

## 2018-07-03 RX ADMIN — FUROSEMIDE 20 MG: 10 INJECTION, SOLUTION INTRAMUSCULAR; INTRAVENOUS at 12:23

## 2018-07-03 NOTE — PROGRESS NOTES
Chief Complaint   Follow-up myeloma.  Currently off therapy due to side effects    PROBLEM LIST   Patient Active Problem List   Diagnosis   • CAD (coronary artery disease)   • Dyslipidemia   • Right hip pain   • Arthritis   • Cellulitis of left lower extremity   • Left carotid bruit   • Plasmacytoma (CMS/HCC)   • Brain cancer (CMS/HCC)   • Neck pain   • Anemia   • Multiple myeloma (CMS/HCC)       HISTORY OF PRESENT ILLNESS:   This very pleasant 78-year-old gentleman is well known to me.  Every bit of evidence indicates that his myeloma is in remission.  Nonetheless he feels terrible.  He's had a several week history of shortness of breath.  He's had a cough which is sometimes productive of white sputum.  His never discolored any never has any hemoptysis.  He's had no fevers or chest pain.    Past Medical History, Past Surgical History, Social History, Family History have been reviewed and are without significant changes except as mentioned.      Medications:      Current Outpatient Prescriptions:   •  allopurinol (ZYLOPRIM) 300 MG tablet, Take 1 tablet by mouth Daily., Disp: 30 tablet, Rfl: 11  •  aspirin 81 MG tablet, Take  by mouth daily., Disp: , Rfl:   •  atorvastatin (LIPITOR) 20 MG tablet, Take  by mouth. 1 QHS, Disp: , Rfl:   •  dexamethasone (DECADRON) 4 MG tablet, Take 10 tablets by mouth Daily With Breakfast. Take with food on Days 1, 8, 15 and 22., Disp: 40 tablet, Rfl: 3  •  dronabinol (MARINOL) 10 MG capsule, Take 1 capsule by mouth 2 (Two) Times a Day Before Meals., Disp: 30 capsule, Rfl: 1  •  glimepiride (AMARYL) 2 MG tablet, Take  by mouth daily., Disp: , Rfl:   •  latanoprost (XALATAN) 0.005 % ophthalmic solution, Apply  to eye. qhs, Disp: , Rfl:   •  lenalidomide (REVLIMID) 15 MG capsule, Take daily on days 1-14, then off 14 days Adult Male [id number], Disp: 21 capsule, Rfl: 0  •  megestrol (MEGACE) 40 MG/ML suspension, TAKE 10 cc/ml BID, Disp: 480 mL, Rfl: 3  •  metoprolol succinate XL  "(TOPROL-XL) 25 MG 24 hr tablet, TAKE 1 TABLET BY MOUTH  DAILY, Disp: 90 tablet, Rfl: 2  •  nitroglycerin (NITROSTAT) 0.4 MG SL tablet, Place 1 tablet under the tongue every 5 (five) minutes as needed for chest pain., Disp: 25 tablet, Rfl: 6  •  ondansetron (ZOFRAN) 8 MG tablet, Take 1 tablet by mouth 3 (Three) Times a Day As Needed for Nausea or Vomiting., Disp: 30 tablet, Rfl: 5  •  sulfamethoxazole-trimethoprim (BACTRIM DS) 800-160 MG per tablet, 1 tab PO 3x/week on M-W-F, Disp: 12 tablet, Rfl: 5  •  ZETIA 10 MG tablet, TAKE 1 TABLET BY MOUTH AT  BEDTIME, Disp: 90 tablet, Rfl: 3  No current facility-administered medications for this visit.     Facility-Administered Medications Ordered in Other Visits:   •  sodium chloride 0.9 % infusion 250 mL, 250 mL, Intravenous, PRN, Inocencia Felipe MD  •  sodium chloride 0.9 % infusion 250 mL, 250 mL, Intravenous, Once, Naresh Turner MD    ALLERGIES:    Allergies   Allergen Reactions   • Penicillins Other (See Comments)     \"broke out in sweat\"   • Simvastatin Myalgia     Swelling and pain in shoulder   • Voltaren [Diclofenac Sodium]        ROS:  Review of Systems   Constitutional: Negative for activity change and appetite change.        Re: Fatigue.   HENT: Negative for mouth sores, sinus pressure and voice change.    Eyes: Negative for visual disturbance.   Respiratory: Negative for shortness of breath.         Very short of breath.   Cardiovascular: Negative for chest pain.   Gastrointestinal: Negative for abdominal pain and vomiting.        No appetite.  He's lost weight.  I tried him on Megace but it didn't help.  He is now on Marinol at night.  10 mg daily at bedtime.   Genitourinary: Negative for dysuria.   Musculoskeletal: Negative for arthralgias and myalgias.   Skin: Negative for color change.   Neurological: Negative for dizziness, syncope and headaches.   Hematological: Negative for adenopathy.   Psychiatric/Behavioral: Negative for confusion, sleep " "disturbance and suicidal ideas. The patient is not nervous/anxious.            Objective:    /50   Pulse 63   Temp 98.4 °F (36.9 °C) (Temporal Artery )   Resp 18   Ht 185.4 cm (73\")   Wt 77.1 kg (170 lb)   BMI 22.43 kg/m²     Physical Exam   Constitutional: He is oriented to person, place, and time. He appears well-developed and well-nourished. No distress.   He is alert and he is oriented.  He smiles readily but he just looks like he feels better   HENT:   Head: Normocephalic.   Mouth/Throat: Oropharynx is clear and moist.   Eyes: Conjunctivae and EOM are normal. No scleral icterus.   Neck: Normal range of motion. Neck supple. No thyromegaly present.   Cardiovascular: Normal rate, regular rhythm and normal heart sounds.    No murmur heard.  Pulmonary/Chest: Effort normal and breath sounds normal. He has no wheezes. He has no rales.   Good excursion.  No rales or wheezes.   Abdominal: Soft. Bowel sounds are normal. He exhibits no distension and no mass. There is no tenderness.   Musculoskeletal: He exhibits no edema or tenderness.   Lymphadenopathy:     He has no cervical adenopathy.   Neurological: He is alert and oriented to person, place, and time. He displays normal reflexes. No cranial nerve deficit.   Skin: Skin is warm and dry. No rash noted.   Psychiatric: He has a normal mood and affect. Judgment normal.   Vitals reviewed.             RECENT LABS:  Hematology WBC   Date Value Ref Range Status   07/03/2018 4.80 3.50 - 10.80 10*3/mm3 Final     Hemoglobin   Date Value Ref Range Status   07/03/2018 8.4 (L) 13.1 - 17.5 g/dL Final     Hematocrit   Date Value Ref Range Status   07/03/2018 25.9 (L) 38.9 - 50.9 % Final     MCV   Date Value Ref Range Status   07/03/2018 100.2 (H) 80.0 - 99.0 fL Final     RDW   Date Value Ref Range Status   07/03/2018 15.3 (H) 11.3 - 14.5 % Final     MPV   Date Value Ref Range Status   07/03/2018 9.4 6.0 - 12.0 fL Final     Platelets   Date Value Ref Range Status "   07/03/2018 37 (L) 150 - 450 10*3/mm3 Final     Comment:     Verified by repeat analysis.      Immature Grans %   Date Value Ref Range Status   01/09/2018 0.0 0.0 - 0.6 % Final     Neutrophils, Absolute   Date Value Ref Range Status   07/03/2018 0.80 (L) 1.50 - 8.30 10*3/mm3 Final     Lymphocytes, Absolute   Date Value Ref Range Status   07/03/2018 2.60 0.60 - 4.80 10*3/mm3 Final     Monocytes, Absolute   Date Value Ref Range Status   07/03/2018 1.40 (H) 0.00 - 1.00 10*3/mm3 Final     Eosinophils, Absolute   Date Value Ref Range Status   01/09/2018 0.01 0.00 - 0.30 10*3/mm3 Final     Basophils, Absolute   Date Value Ref Range Status   01/09/2018 0.00 0.00 - 0.20 10*3/mm3 Final     Immature Grans, Absolute   Date Value Ref Range Status   01/09/2018 0.00 0.00 - 0.03 10*3/mm3 Final     nRBC   Date Value Ref Range Status   03/19/2015 0.0  Final       Glucose   Date Value Ref Range Status   06/14/2018 203 (H) 70 - 100 mg/dL Final     Sodium   Date Value Ref Range Status   06/14/2018 137 132 - 146 mmol/L Final     Potassium   Date Value Ref Range Status   06/14/2018 4.5 3.5 - 5.5 mmol/L Final     CO2   Date Value Ref Range Status   06/14/2018 26.0 20.0 - 31.0 mmol/L Final     Chloride   Date Value Ref Range Status   06/14/2018 105 99 - 109 mmol/L Final     Anion Gap   Date Value Ref Range Status   06/14/2018 6.0 3.0 - 11.0 mmol/L Final     Creatinine   Date Value Ref Range Status   06/14/2018 0.90 0.60 - 1.30 mg/dL Final     Comment:     Serial Number: [id number]:  [id number]     BUN   Date Value Ref Range Status   06/14/2018 15 9 - 23 mg/dL Final     BUN/Creatinine Ratio   Date Value Ref Range Status   06/14/2018 14.9 7.0 - 25.0 Final     Calcium   Date Value Ref Range Status   06/14/2018 8.7 8.7 - 10.4 mg/dL Final     eGFR Non  Amer   Date Value Ref Range Status   06/14/2018 71 >60 mL/min/1.73 Final     Alkaline Phosphatase   Date Value Ref Range Status   06/14/2018 50 25 - 100 U/L Final     Total Protein   Date  Value Ref Range Status   06/14/2018 5.7 5.7 - 8.2 g/dL Final     ALT (SGPT)   Date Value Ref Range Status   06/14/2018 15 7 - 40 U/L Final     AST (SGOT)   Date Value Ref Range Status   06/14/2018 15 0 - 33 U/L Final     Total Bilirubin   Date Value Ref Range Status   06/14/2018 0.6 0.3 - 1.2 mg/dL Final     Albumin   Date Value Ref Range Status   06/14/2018 3.70 3.20 - 4.80 g/dL Final     Globulin   Date Value Ref Range Status   06/14/2018 2.0 gm/dL Final     A/G Ratio   Date Value Ref Range Status   06/14/2018 1.9 1.5 - 2.5 g/dL Final          Perf Status: 1    Assessment/Plan    Diagnoses and all orders for this visit:    Multiple myeloma in remission (CMS/HCC)      Dyspnea with cough.  Chest x-ray 6 days ago was clear.  His lungs sound clear.  I think it's important to exclude the possibility of pulmonary thromboemboli.    I'll get a PET protocol CT scan of the chest stat.  Further recommendations will be based on results of that.  I discussed all that with him and his family      Naresh Turner MD , 7/3/2018    CC:

## 2018-07-04 LAB
ABO + RH BLD: NORMAL
BH BB BLOOD EXPIRATION DATE: NORMAL
BH BB BLOOD TYPE BARCODE: 9500
BH BB DISPENSE STATUS: NORMAL
BH BB PRODUCT CODE: NORMAL
BH BB UNIT NUMBER: NORMAL
UNIT  ABO: NORMAL
UNIT  RH: NORMAL

## 2018-07-05 ENCOUNTER — INFUSION (OUTPATIENT)
Dept: ONCOLOGY | Facility: HOSPITAL | Age: 78
End: 2018-07-05

## 2018-07-05 VITALS
RESPIRATION RATE: 16 BRPM | SYSTOLIC BLOOD PRESSURE: 125 MMHG | DIASTOLIC BLOOD PRESSURE: 59 MMHG | HEART RATE: 82 BPM | TEMPERATURE: 98.4 F | WEIGHT: 167 LBS | HEIGHT: 73 IN | BODY MASS INDEX: 22.13 KG/M2

## 2018-07-05 DIAGNOSIS — C90.30 PLASMACYTOMA (HCC): ICD-10-CM

## 2018-07-05 LAB
ERYTHROCYTE [DISTWIDTH] IN BLOOD BY AUTOMATED COUNT: 16.7 % (ref 11.3–14.5)
HCT VFR BLD AUTO: 31.9 % (ref 38.9–50.9)
HGB BLD-MCNC: 10.2 G/DL (ref 13.1–17.5)
LYMPHOCYTES # BLD AUTO: 4.5 10*3/MM3 (ref 0.6–4.8)
LYMPHOCYTES NFR BLD AUTO: 70.2 % (ref 24–44)
MCH RBC QN AUTO: 31.9 PG (ref 27–31)
MCHC RBC AUTO-ENTMCNC: 32.1 G/DL (ref 32–36)
MCV RBC AUTO: 99.5 FL (ref 80–99)
MONOCYTES # BLD AUTO: 0.2 10*3/MM3 (ref 0–1)
MONOCYTES NFR BLD AUTO: 3.6 % (ref 0–12)
NEUTROPHILS # BLD AUTO: 1.7 10*3/MM3 (ref 1.5–8.3)
NEUTROPHILS NFR BLD AUTO: 26.2 % (ref 41–71)
PLATELET # BLD AUTO: 41 10*3/MM3 (ref 150–450)
PMV BLD AUTO: 7.7 FL (ref 6–12)
RBC # BLD AUTO: 3.21 10*6/MM3 (ref 4.2–5.76)
WBC NRBC COR # BLD: 6.4 10*3/MM3 (ref 3.5–10.8)

## 2018-07-05 PROCEDURE — 85025 COMPLETE CBC W/AUTO DIFF WBC: CPT | Performed by: INTERNAL MEDICINE

## 2018-07-05 PROCEDURE — 36415 COLL VENOUS BLD VENIPUNCTURE: CPT

## 2018-07-09 ENCOUNTER — CLINICAL SUPPORT (OUTPATIENT)
Dept: ONCOLOGY | Facility: HOSPITAL | Age: 78
End: 2018-07-09

## 2018-07-09 VITALS
RESPIRATION RATE: 16 BRPM | SYSTOLIC BLOOD PRESSURE: 111 MMHG | DIASTOLIC BLOOD PRESSURE: 50 MMHG | HEART RATE: 70 BPM | WEIGHT: 165 LBS | TEMPERATURE: 97.8 F | OXYGEN SATURATION: 92 % | BODY MASS INDEX: 21.77 KG/M2

## 2018-07-09 DIAGNOSIS — C90.01 MULTIPLE MYELOMA IN REMISSION (HCC): ICD-10-CM

## 2018-07-09 DIAGNOSIS — R63.0 ANOREXIA: Primary | ICD-10-CM

## 2018-07-09 DIAGNOSIS — C71.9 MALIGNANT NEOPLASM OF BRAIN, UNSPECIFIED LOCATION (HCC): ICD-10-CM

## 2018-07-09 LAB
BASOPHILS # BLD AUTO: 0.01 10*3/MM3 (ref 0–0.2)
BASOPHILS NFR BLD AUTO: 0.3 % (ref 0–1)
DEPRECATED RDW RBC AUTO: 53.9 FL (ref 37–54)
EOSINOPHIL # BLD AUTO: 0 10*3/MM3 (ref 0–0.3)
EOSINOPHIL NFR BLD AUTO: 0 % (ref 0–3)
ERYTHROCYTE [DISTWIDTH] IN BLOOD BY AUTOMATED COUNT: 14.9 % (ref 11.3–14.5)
HCT VFR BLD AUTO: 29.4 % (ref 38.9–50.9)
HGB BLD-MCNC: 9.5 G/DL (ref 13.1–17.5)
IMM GRANULOCYTES # BLD: 0.01 10*3/MM3 (ref 0–0.03)
IMM GRANULOCYTES NFR BLD: 0.3 % (ref 0–0.6)
LYMPHOCYTES # BLD AUTO: 2.46 10*3/MM3 (ref 0.6–4.8)
LYMPHOCYTES NFR BLD AUTO: 71.7 % (ref 24–44)
MCH RBC QN AUTO: 32.1 PG (ref 27–31)
MCHC RBC AUTO-ENTMCNC: 32.3 G/DL (ref 32–36)
MCV RBC AUTO: 99.3 FL (ref 80–99)
MONOCYTES # BLD AUTO: 0.17 10*3/MM3 (ref 0–1)
MONOCYTES NFR BLD AUTO: 5 % (ref 0–12)
NEUTROPHILS # BLD AUTO: 0.79 10*3/MM3 (ref 1.5–8.3)
NEUTROPHILS NFR BLD AUTO: 23 % (ref 41–71)
PLAT MORPH BLD: NORMAL
PLATELET # BLD AUTO: 32 10*3/MM3 (ref 150–450)
PMV BLD AUTO: 11 FL (ref 6–12)
RBC # BLD AUTO: 2.96 10*6/MM3 (ref 4.2–5.76)
RBC MORPH BLD: NORMAL
WBC MORPH BLD: NORMAL
WBC NRBC COR # BLD: 3.43 10*3/MM3 (ref 3.5–10.8)

## 2018-07-09 PROCEDURE — 38222 DX BONE MARROW BX & ASPIR: CPT | Performed by: INTERNAL MEDICINE

## 2018-07-09 PROCEDURE — 88311 DECALCIFY TISSUE: CPT | Performed by: INTERNAL MEDICINE

## 2018-07-09 PROCEDURE — 88313 SPECIAL STAINS GROUP 2: CPT | Performed by: INTERNAL MEDICINE

## 2018-07-09 PROCEDURE — 88342 IMHCHEM/IMCYTCHM 1ST ANTB: CPT | Performed by: INTERNAL MEDICINE

## 2018-07-09 PROCEDURE — 88305 TISSUE EXAM BY PATHOLOGIST: CPT | Performed by: INTERNAL MEDICINE

## 2018-07-09 PROCEDURE — 85007 BL SMEAR W/DIFF WBC COUNT: CPT | Performed by: INTERNAL MEDICINE

## 2018-07-09 PROCEDURE — 25010000002 MIDAZOLAM PER 1 MG: Performed by: INTERNAL MEDICINE

## 2018-07-09 PROCEDURE — 85097 BONE MARROW INTERPRETATION: CPT | Performed by: INTERNAL MEDICINE

## 2018-07-09 PROCEDURE — 88341 IMHCHEM/IMCYTCHM EA ADD ANTB: CPT | Performed by: INTERNAL MEDICINE

## 2018-07-09 PROCEDURE — 88275 CYTOGENETICS 100-300: CPT

## 2018-07-09 PROCEDURE — 88184 FLOWCYTOMETRY/ TC 1 MARKER: CPT

## 2018-07-09 PROCEDURE — 96374 THER/PROPH/DIAG INJ IV PUSH: CPT

## 2018-07-09 PROCEDURE — 88185 FLOWCYTOMETRY/TC ADD-ON: CPT

## 2018-07-09 PROCEDURE — 96375 TX/PRO/DX INJ NEW DRUG ADDON: CPT

## 2018-07-09 PROCEDURE — 85025 COMPLETE CBC W/AUTO DIFF WBC: CPT | Performed by: INTERNAL MEDICINE

## 2018-07-09 PROCEDURE — 88271 CYTOGENETICS DNA PROBE: CPT

## 2018-07-09 RX ORDER — SODIUM CHLORIDE 9 MG/ML
500 INJECTION, SOLUTION INTRAVENOUS ONCE
Status: COMPLETED | OUTPATIENT
Start: 2018-07-09 | End: 2018-07-09

## 2018-07-09 RX ORDER — LIDOCAINE HYDROCHLORIDE 20 MG/ML
10 INJECTION, SOLUTION INFILTRATION; PERINEURAL ONCE
Status: DISCONTINUED | OUTPATIENT
Start: 2018-07-09 | End: 2018-07-09 | Stop reason: HOSPADM

## 2018-07-09 RX ORDER — MIDAZOLAM HYDROCHLORIDE 1 MG/ML
0.5 INJECTION INTRAMUSCULAR; INTRAVENOUS
Status: DISCONTINUED | OUTPATIENT
Start: 2018-07-09 | End: 2018-07-09 | Stop reason: HOSPADM

## 2018-07-09 RX ADMIN — MIDAZOLAM HYDROCHLORIDE 1 MG: 1 INJECTION, SOLUTION INTRAMUSCULAR; INTRAVENOUS at 08:31

## 2018-07-09 RX ADMIN — MIDAZOLAM HYDROCHLORIDE 1 MG: 1 INJECTION, SOLUTION INTRAMUSCULAR; INTRAVENOUS at 08:35

## 2018-07-09 RX ADMIN — SODIUM CHLORIDE 500 ML: 9 INJECTION, SOLUTION INTRAVENOUS at 08:30

## 2018-07-10 DIAGNOSIS — R00.2 PALPITATIONS: Primary | ICD-10-CM

## 2018-07-10 DIAGNOSIS — C90.01 MULTIPLE MYELOMA IN REMISSION (HCC): ICD-10-CM

## 2018-07-10 DIAGNOSIS — C71.9 MALIGNANT NEOPLASM OF BRAIN, UNSPECIFIED LOCATION (HCC): ICD-10-CM

## 2018-07-10 DIAGNOSIS — R06.02 SOB (SHORTNESS OF BREATH): ICD-10-CM

## 2018-07-10 DIAGNOSIS — R06.00 DYSPNEA, UNSPECIFIED TYPE: ICD-10-CM

## 2018-07-11 DIAGNOSIS — C90.30 PLASMACYTOMA (HCC): ICD-10-CM

## 2018-07-12 ENCOUNTER — HOSPITAL ENCOUNTER (OUTPATIENT)
Dept: CARDIOLOGY | Facility: HOSPITAL | Age: 78
Discharge: HOME | End: 2018-07-12
Attending: INTERNAL MEDICINE

## 2018-07-12 ENCOUNTER — OFFICE VISIT (OUTPATIENT)
Dept: ONCOLOGY | Facility: CLINIC | Age: 78
End: 2018-07-12

## 2018-07-12 ENCOUNTER — INFUSION (OUTPATIENT)
Dept: ONCOLOGY | Facility: HOSPITAL | Age: 78
End: 2018-07-12

## 2018-07-12 ENCOUNTER — HOSPITAL ENCOUNTER (OUTPATIENT)
Dept: MRI IMAGING | Facility: HOSPITAL | Age: 78
Discharge: HOME | End: 2018-07-12
Attending: INTERNAL MEDICINE | Admitting: INTERNAL MEDICINE

## 2018-07-12 VITALS
RESPIRATION RATE: 16 BRPM | HEIGHT: 73 IN | HEART RATE: 75 BPM | TEMPERATURE: 98 F | DIASTOLIC BLOOD PRESSURE: 64 MMHG | WEIGHT: 165 LBS | SYSTOLIC BLOOD PRESSURE: 155 MMHG | BODY MASS INDEX: 21.87 KG/M2

## 2018-07-12 VITALS
HEIGHT: 73 IN | BODY MASS INDEX: 22.53 KG/M2 | DIASTOLIC BLOOD PRESSURE: 63 MMHG | WEIGHT: 170 LBS | HEART RATE: 85 BPM | SYSTOLIC BLOOD PRESSURE: 142 MMHG | TEMPERATURE: 98.2 F | RESPIRATION RATE: 16 BRPM

## 2018-07-12 VITALS — HEIGHT: 73 IN | WEIGHT: 165 LBS | BODY MASS INDEX: 21.87 KG/M2

## 2018-07-12 DIAGNOSIS — R06.02 SOB (SHORTNESS OF BREATH): ICD-10-CM

## 2018-07-12 DIAGNOSIS — R00.2 PALPITATIONS: ICD-10-CM

## 2018-07-12 DIAGNOSIS — C71.9 MALIGNANT NEOPLASM OF BRAIN, UNSPECIFIED LOCATION (HCC): ICD-10-CM

## 2018-07-12 DIAGNOSIS — R63.0 ANOREXIA: ICD-10-CM

## 2018-07-12 DIAGNOSIS — C90.01 MULTIPLE MYELOMA IN REMISSION (HCC): ICD-10-CM

## 2018-07-12 DIAGNOSIS — C90.30 PLASMACYTOMA (HCC): ICD-10-CM

## 2018-07-12 DIAGNOSIS — C90.01 MULTIPLE MYELOMA IN REMISSION (HCC): Primary | ICD-10-CM

## 2018-07-12 DIAGNOSIS — R06.00 DYSPNEA, UNSPECIFIED TYPE: ICD-10-CM

## 2018-07-12 DIAGNOSIS — C90.00 MULTIPLE MYELOMA NOT HAVING ACHIEVED REMISSION (HCC): ICD-10-CM

## 2018-07-12 DIAGNOSIS — C90.30 PLASMACYTOMA (HCC): Primary | ICD-10-CM

## 2018-07-12 DIAGNOSIS — C95.00 ACUTE LEUKEMIA NOT HAVING ACHIEVED REMISSION (HCC): Primary | ICD-10-CM

## 2018-07-12 LAB
BH CV ECHO MEAS - AO MAX PG (FULL): 4.5 MMHG
BH CV ECHO MEAS - AO MAX PG: 9 MMHG
BH CV ECHO MEAS - AO ROOT AREA (BSA CORRECTED): 1.8
BH CV ECHO MEAS - AO ROOT AREA: 9.8 CM^2
BH CV ECHO MEAS - AO ROOT DIAM: 3.5 CM
BH CV ECHO MEAS - AO V2 MAX: 154.2 CM/SEC
BH CV ECHO MEAS - AVA(V,A): 2.1 CM^2
BH CV ECHO MEAS - AVA(V,D): 2.1 CM^2
BH CV ECHO MEAS - BSA(HAYCOCK): 2 M^2
BH CV ECHO MEAS - BSA: 2 M^2
BH CV ECHO MEAS - BZI_BMI: 21.9 KILOGRAMS/M^2
BH CV ECHO MEAS - BZI_METRIC_HEIGHT: 185 CM
BH CV ECHO MEAS - BZI_METRIC_WEIGHT: 75 KG
BH CV ECHO MEAS - CONTRAST EF (2CH): 68 ML/M^2
BH CV ECHO MEAS - CONTRAST EF 4CH: 70 ML/M^2
BH CV ECHO MEAS - EDV(CUBED): 100.3 ML
BH CV ECHO MEAS - EDV(MOD-SP2): 68.2 ML
BH CV ECHO MEAS - EDV(MOD-SP4): 90 ML
BH CV ECHO MEAS - EDV(TEICH): 99.7 ML
BH CV ECHO MEAS - EF(CUBED): 79.2 %
BH CV ECHO MEAS - EF(MOD-SP2): 68 %
BH CV ECHO MEAS - EF(MOD-SP4): 70 %
BH CV ECHO MEAS - EF(TEICH): 71.5 %
BH CV ECHO MEAS - ESV(CUBED): 20.9 ML
BH CV ECHO MEAS - ESV(MOD-SP2): 21.8 ML
BH CV ECHO MEAS - ESV(MOD-SP4): 27 ML
BH CV ECHO MEAS - ESV(TEICH): 28.4 ML
BH CV ECHO MEAS - FS: 40.7 %
BH CV ECHO MEAS - IVS/LVPW: 0.89
BH CV ECHO MEAS - IVSD: 0.9 CM
BH CV ECHO MEAS - LA DIMENSION: 4.1 CM
BH CV ECHO MEAS - LA/AO: 1.2
BH CV ECHO MEAS - LAT PEAK E' VEL: 9.6 CM/SEC
BH CV ECHO MEAS - LV DIASTOLIC VOL/BSA (35-75): 45.4 ML/M^2
BH CV ECHO MEAS - LV MASS(C)D: 152.3 GRAMS
BH CV ECHO MEAS - LV MASS(C)DI: 76.8 GRAMS/M^2
BH CV ECHO MEAS - LV MAX PG: 4.5 MMHG
BH CV ECHO MEAS - LV SYSTOLIC VOL/BSA (12-30): 13.6 ML/M^2
BH CV ECHO MEAS - LV V1 MAX: 106.2 CM/SEC
BH CV ECHO MEAS - LVIDD: 4.6 CM
BH CV ECHO MEAS - LVIDS: 2.8 CM
BH CV ECHO MEAS - LVLD AP2: 7.7 CM
BH CV ECHO MEAS - LVLD AP4: 7.5 CM
BH CV ECHO MEAS - LVLS AP2: 6.3 CM
BH CV ECHO MEAS - LVLS AP4: 6.4 CM
BH CV ECHO MEAS - LVOT AREA (M): 3.1 CM^2
BH CV ECHO MEAS - LVOT AREA: 3.1 CM^2
BH CV ECHO MEAS - LVOT DIAM: 2 CM
BH CV ECHO MEAS - LVPWD: 1 CM
BH CV ECHO MEAS - MED PEAK E' VEL: 7.72 CM/SEC
BH CV ECHO MEAS - MV A MAX VEL: 78.1 CM/SEC
BH CV ECHO MEAS - MV DEC SLOPE: 449.6 CM/SEC^2
BH CV ECHO MEAS - MV DEC TIME: 0.27 SEC
BH CV ECHO MEAS - MV E MAX VEL: 91.2 CM/SEC
BH CV ECHO MEAS - MV E/A: 1.2
BH CV ECHO MEAS - MV P1/2T MAX VEL: 114.8 CM/SEC
BH CV ECHO MEAS - MV P1/2T: 74.8 MSEC
BH CV ECHO MEAS - MVA P1/2T LCG: 1.9 CM^2
BH CV ECHO MEAS - MVA(P1/2T): 2.9 CM^2
BH CV ECHO MEAS - PA ACC TIME: 0.21 SEC
BH CV ECHO MEAS - PA MAX PG (FULL): 5.5 MMHG
BH CV ECHO MEAS - PA MAX PG: 6.9 MMHG
BH CV ECHO MEAS - PA PR(ACCEL): -14.5 MMHG
BH CV ECHO MEAS - PA V2 MAX: 131.4 CM/SEC
BH CV ECHO MEAS - RAP SYSTOLE: 3 MMHG
BH CV ECHO MEAS - RV MAX PG: 1.4 MMHG
BH CV ECHO MEAS - RV V1 MAX: 60.1 CM/SEC
BH CV ECHO MEAS - RVSP: 29 MMHG
BH CV ECHO MEAS - SI(CUBED): 40.1 ML/M^2
BH CV ECHO MEAS - SI(MOD-SP2): 23.4 ML/M^2
BH CV ECHO MEAS - SI(MOD-SP4): 31.8 ML/M^2
BH CV ECHO MEAS - SI(TEICH): 36 ML/M^2
BH CV ECHO MEAS - SV(CUBED): 79.4 ML
BH CV ECHO MEAS - SV(MOD-SP2): 46.4 ML
BH CV ECHO MEAS - SV(MOD-SP4): 63 ML
BH CV ECHO MEAS - SV(TEICH): 71.3 ML
BH CV ECHO MEAS - TR MAX V: 26 MMHG
BH CV ECHO MEAS - TR MAX VEL: 257 CM/SEC
BH CV ECHO MEASUREMENTS AVERAGE E/E' RATIO: 10.53
BH CV XLRA - RV BASE: 4.5 CM
BH CV XLRA - RV LENGTH: 6.7 CM
BH CV XLRA - RV MID: 2.5 CM
CREAT BLDA-MCNC: 0.8 MG/DL
CREAT BLDA-MCNC: 0.8 MG/DL (ref 0.6–1.3)
ERYTHROCYTE [DISTWIDTH] IN BLOOD BY AUTOMATED COUNT: 15.9 % (ref 11.3–14.5)
FERRITIN SERPL-MCNC: 1497 NG/ML (ref 22–322)
HCT VFR BLD AUTO: 26.2 % (ref 38.9–50.9)
HGB BLD-MCNC: 8.7 G/DL (ref 13.1–17.5)
IRON 24H UR-MRATE: 214 MCG/DL (ref 50–175)
IRON SATN MFR SERPL: 82 % (ref 20–50)
LDH SERPL-CCNC: 181 U/L (ref 120–246)
LEFT ATRIUM VOLUME INDEX: 29.4 ML/M2
LEFT ATRIUM VOLUME: 58 CM3
LV EF 2D ECHO EST: 60 %
LYMPHOCYTES # BLD AUTO: 2.1 10*3/MM3 (ref 0.6–4.8)
LYMPHOCYTES NFR BLD AUTO: 58.8 % (ref 24–44)
MAXIMAL PREDICTED HEART RATE: 140 BPM
MCH RBC QN AUTO: 32.4 PG (ref 27–31)
MCHC RBC AUTO-ENTMCNC: 33.1 G/DL (ref 32–36)
MCV RBC AUTO: 98 FL (ref 80–99)
MONOCYTES # BLD AUTO: 0.6 10*3/MM3 (ref 0–1)
MONOCYTES NFR BLD AUTO: 16.5 % (ref 0–12)
NEUTROPHILS # BLD AUTO: 0.9 10*3/MM3 (ref 1.5–8.3)
NEUTROPHILS NFR BLD AUTO: 24.7 % (ref 41–71)
PLATELET # BLD AUTO: 36 10*3/MM3 (ref 150–450)
PMV BLD AUTO: 9.1 FL (ref 6–12)
RBC # BLD AUTO: 2.67 10*6/MM3 (ref 4.2–5.76)
STRESS TARGET HR: 119 BPM
TIBC SERPL-MCNC: 262 MCG/DL (ref 250–450)
WBC NRBC COR # BLD: 3.5 10*3/MM3 (ref 3.5–10.8)

## 2018-07-12 PROCEDURE — 85025 COMPLETE CBC W/AUTO DIFF WBC: CPT | Performed by: INTERNAL MEDICINE

## 2018-07-12 PROCEDURE — 63510000001 EPOETIN ALFA PER 1000 UNITS: Performed by: INTERNAL MEDICINE

## 2018-07-12 PROCEDURE — 25010000002 ZOLEDRONIC ACID PER 1 MG: Performed by: INTERNAL MEDICINE

## 2018-07-12 PROCEDURE — A9577 INJ MULTIHANCE: HCPCS | Performed by: INTERNAL MEDICINE

## 2018-07-12 PROCEDURE — 96372 THER/PROPH/DIAG INJ SC/IM: CPT

## 2018-07-12 PROCEDURE — 96374 THER/PROPH/DIAG INJ IV PUSH: CPT

## 2018-07-12 PROCEDURE — 36415 COLL VENOUS BLD VENIPUNCTURE: CPT

## 2018-07-12 PROCEDURE — 82728 ASSAY OF FERRITIN: CPT | Performed by: INTERNAL MEDICINE

## 2018-07-12 PROCEDURE — 93306 TTE W/DOPPLER COMPLETE: CPT

## 2018-07-12 PROCEDURE — 83550 IRON BINDING TEST: CPT | Performed by: INTERNAL MEDICINE

## 2018-07-12 PROCEDURE — 93306 TTE W/DOPPLER COMPLETE: CPT | Performed by: INTERNAL MEDICINE

## 2018-07-12 PROCEDURE — 0 GADOBENATE DIMEGLUMINE 529 MG/ML SOLUTION: Performed by: INTERNAL MEDICINE

## 2018-07-12 PROCEDURE — 70553 MRI BRAIN STEM W/O & W/DYE: CPT

## 2018-07-12 PROCEDURE — 83615 LACTATE (LD) (LDH) ENZYME: CPT | Performed by: INTERNAL MEDICINE

## 2018-07-12 PROCEDURE — 0399T HC MYOCARDL STRAIN IMAG QUAN ASSMT PER SESS: CPT

## 2018-07-12 PROCEDURE — 83540 ASSAY OF IRON: CPT | Performed by: INTERNAL MEDICINE

## 2018-07-12 PROCEDURE — 99213 OFFICE O/P EST LOW 20 MIN: CPT | Performed by: INTERNAL MEDICINE

## 2018-07-12 PROCEDURE — 82565 ASSAY OF CREATININE: CPT | Performed by: INTERNAL MEDICINE

## 2018-07-12 RX ORDER — SODIUM CHLORIDE 9 MG/ML
250 INJECTION, SOLUTION INTRAVENOUS ONCE
Status: COMPLETED | OUTPATIENT
Start: 2018-07-12 | End: 2018-07-12

## 2018-07-12 RX ORDER — DRONABINOL 5 MG/1
10 CAPSULE ORAL
Refills: 1 | COMMUNITY
Start: 2018-07-06 | End: 2019-01-01 | Stop reason: SDUPTHER

## 2018-07-12 RX ORDER — SODIUM CHLORIDE 9 MG/ML
250 INJECTION, SOLUTION INTRAVENOUS ONCE
Status: CANCELLED | OUTPATIENT
Start: 2018-07-12

## 2018-07-12 RX ADMIN — ERYTHROPOIETIN 40000 UNITS: 40000 INJECTION, SOLUTION INTRAVENOUS; SUBCUTANEOUS at 14:42

## 2018-07-12 RX ADMIN — SODIUM CHLORIDE 250 ML: 9 INJECTION, SOLUTION INTRAVENOUS at 14:42

## 2018-07-12 RX ADMIN — ZOLEDRONIC ACID 4 MG: 4 INJECTION, SOLUTION, CONCENTRATE INTRAVENOUS at 14:42

## 2018-07-12 RX ADMIN — GADOBENATE DIMEGLUMINE 15 ML: 529 INJECTION, SOLUTION INTRAVENOUS at 12:15

## 2018-07-12 NOTE — PROGRESS NOTES
Chief Complaint   Follow-up multiple myeloma.  Newly diagnosed AML.    PROBLEM LIST   Patient Active Problem List   Diagnosis   • CAD (coronary artery disease)   • Dyslipidemia   • Right hip pain   • Arthritis   • Cellulitis of left lower extremity   • Left carotid bruit   • Plasmacytoma (CMS/HCC)   • Brain cancer (CMS/HCC)   • Neck pain   • Anemia   • Multiple myeloma (CMS/HCC)       HISTORY OF PRESENT ILLNESS:   Marcin shortness of breath and his lack of appetite have actually improved a bit.  He was transfused yesterday and his hemoglobin was 10 after transfusion.  I repeated his bone marrow aspiration and biopsy 2 days ago.  He has no evidence of obvious myeloma but unfortunately now has probably greater than 20% blasts.    Past Medical History, Past Surgical History, Social History, Family History have been reviewed and are without significant changes except as mentioned.      Medications:      Current Outpatient Prescriptions:   •  allopurinol (ZYLOPRIM) 300 MG tablet, Take 1 tablet by mouth Daily., Disp: 30 tablet, Rfl: 11  •  aspirin 81 MG tablet, Take  by mouth daily., Disp: , Rfl:   •  atorvastatin (LIPITOR) 20 MG tablet, Take  by mouth. 1 QHS, Disp: , Rfl:   •  dexamethasone (DECADRON) 4 MG tablet, Take 10 tablets by mouth Daily With Breakfast. Take with food on Days 1, 8, 15 and 22., Disp: 40 tablet, Rfl: 3  •  dronabinol (MARINOL) 10 MG capsule, Take 1 capsule by mouth 2 (Two) Times a Day Before Meals., Disp: 30 capsule, Rfl: 1  •  dronabinol (MARINOL) 5 MG capsule, Take 10 mg by mouth every night at bedtime., Disp: , Rfl: 1  •  glimepiride (AMARYL) 2 MG tablet, Take  by mouth daily., Disp: , Rfl:   •  latanoprost (XALATAN) 0.005 % ophthalmic solution, Apply  to eye. qhs, Disp: , Rfl:   •  metoprolol succinate XL (TOPROL-XL) 25 MG 24 hr tablet, TAKE 1 TABLET BY MOUTH  DAILY, Disp: 90 tablet, Rfl: 2  •  nitroglycerin (NITROSTAT) 0.4 MG SL tablet, Place 1 tablet under the tongue every 5 (five) minutes as  "needed for chest pain., Disp: 25 tablet, Rfl: 6  •  ondansetron (ZOFRAN) 8 MG tablet, Take 1 tablet by mouth 3 (Three) Times a Day As Needed for Nausea or Vomiting., Disp: 30 tablet, Rfl: 5  •  sulfamethoxazole-trimethoprim (BACTRIM DS) 800-160 MG per tablet, 1 tab PO 3x/week on M-W-F, Disp: 12 tablet, Rfl: 5  •  ZETIA 10 MG tablet, TAKE 1 TABLET BY MOUTH AT  BEDTIME, Disp: 90 tablet, Rfl: 3  No current facility-administered medications for this visit.     Facility-Administered Medications Ordered in Other Visits:   •  sodium chloride 0.9 % infusion 250 mL, 250 mL, Intravenous, PRN, Inocencia Felipe MD  •  sodium chloride 0.9 % infusion 250 mL, 250 mL, Intravenous, Once, Naresh Turner MD    ALLERGIES:    Allergies   Allergen Reactions   • Penicillins Other (See Comments)     \"broke out in sweat\"   • Simvastatin Myalgia     Swelling and pain in shoulder   • Voltaren [Diclofenac Sodium]        ROS:  Review of Systems   Constitutional: Negative for activity change and appetite change.        Fatigue and exertional dyspnea and lack of appetite continue that they do seem to be a bit better for what ever reason.  I did start him on Marinol for his appetite after a trial of Megace did not work    HENT: Negative for mouth sores, sinus pressure and voice change.    Eyes: Negative for visual disturbance.   Respiratory: Negative for shortness of breath.    Cardiovascular: Negative for chest pain.   Gastrointestinal: Negative for abdominal pain and vomiting.   Genitourinary: Negative for dysuria.   Musculoskeletal: Negative for arthralgias and myalgias.   Skin: Negative for color change.   Neurological: Negative for dizziness, syncope and headaches.   Hematological: Negative for adenopathy.   Psychiatric/Behavioral: Negative for confusion, sleep disturbance and suicidal ideas. The patient is not nervous/anxious.            Objective:    /64   Pulse 75   Temp 98 °F (36.7 °C)   Resp 16   Ht 185.4 cm (73\")   Wt " 74.8 kg (165 lb)   BMI 21.77 kg/m²     Physical Exam  physical exam was limited to auscultation of the lungs and the heart which were unremarkable        RECENT LABS:  Hematology WBC   Date Value Ref Range Status   07/09/2018 3.43 (L) 3.50 - 10.80 10*3/mm3 Final     Hemoglobin   Date Value Ref Range Status   07/09/2018 9.5 (L) 13.1 - 17.5 g/dL Final     Hematocrit   Date Value Ref Range Status   07/09/2018 29.4 (L) 38.9 - 50.9 % Final     MCV   Date Value Ref Range Status   07/09/2018 99.3 (H) 80.0 - 99.0 fL Final     RDW   Date Value Ref Range Status   07/09/2018 14.9 (H) 11.3 - 14.5 % Final     MPV   Date Value Ref Range Status   07/09/2018 11.0 6.0 - 12.0 fL Final     Platelets   Date Value Ref Range Status   07/09/2018 32 (L) 150 - 450 10*3/mm3 Final     Immature Grans %   Date Value Ref Range Status   07/09/2018 0.3 0.0 - 0.6 % Final     Neutrophils, Absolute   Date Value Ref Range Status   07/09/2018 0.79 (L) 1.50 - 8.30 10*3/mm3 Final     Lymphocytes, Absolute   Date Value Ref Range Status   07/09/2018 2.46 0.60 - 4.80 10*3/mm3 Final     Monocytes, Absolute   Date Value Ref Range Status   07/09/2018 0.17 0.00 - 1.00 10*3/mm3 Final     Eosinophils, Absolute   Date Value Ref Range Status   07/09/2018 0.00 0.00 - 0.30 10*3/mm3 Final     Basophils, Absolute   Date Value Ref Range Status   07/09/2018 0.01 0.00 - 0.20 10*3/mm3 Final     Immature Grans, Absolute   Date Value Ref Range Status   07/09/2018 0.01 0.00 - 0.03 10*3/mm3 Final     nRBC   Date Value Ref Range Status   03/19/2015 0.0  Final       Glucose   Date Value Ref Range Status   06/14/2018 203 (H) 70 - 100 mg/dL Final     Sodium   Date Value Ref Range Status   06/14/2018 137 132 - 146 mmol/L Final     Potassium   Date Value Ref Range Status   06/14/2018 4.5 3.5 - 5.5 mmol/L Final     CO2   Date Value Ref Range Status   06/14/2018 26.0 20.0 - 31.0 mmol/L Final     Chloride   Date Value Ref Range Status   06/14/2018 105 99 - 109 mmol/L Final     Anion  Gap   Date Value Ref Range Status   06/14/2018 6.0 3.0 - 11.0 mmol/L Final     Creatinine   Date Value Ref Range Status   06/14/2018 0.90 0.60 - 1.30 mg/dL Final     Comment:     Serial Number: [id number]:  [id number]     BUN   Date Value Ref Range Status   06/14/2018 15 9 - 23 mg/dL Final     BUN/Creatinine Ratio   Date Value Ref Range Status   06/14/2018 14.9 7.0 - 25.0 Final     Calcium   Date Value Ref Range Status   06/14/2018 8.7 8.7 - 10.4 mg/dL Final     eGFR Non  Amer   Date Value Ref Range Status   06/14/2018 71 >60 mL/min/1.73 Final     Alkaline Phosphatase   Date Value Ref Range Status   06/14/2018 50 25 - 100 U/L Final     Total Protein   Date Value Ref Range Status   06/14/2018 5.7 5.7 - 8.2 g/dL Final     ALT (SGPT)   Date Value Ref Range Status   06/14/2018 15 7 - 40 U/L Final     AST (SGOT)   Date Value Ref Range Status   06/14/2018 15 0 - 33 U/L Final     Total Bilirubin   Date Value Ref Range Status   06/14/2018 0.6 0.3 - 1.2 mg/dL Final     Albumin   Date Value Ref Range Status   06/14/2018 3.70 3.20 - 4.80 g/dL Final     Globulin   Date Value Ref Range Status   06/14/2018 2.0 gm/dL Final     A/G Ratio   Date Value Ref Range Status   06/14/2018 1.9 1.5 - 2.5 g/dL Final          Perf Status:1    Assessment/Plan    Diagnoses and all orders for this visit:    Multiple myeloma in remission (CMS/HCC)      Hernandez responded very nicely to treatment for his myeloma but unfortunately 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  I talked with Dr. Chuck Mcallister of pathology who will continue the molecular workup of his bone marrow.  I have also spoken with  at [facility], Department of bone marrow transplant.  She will see the patient next week.  I do not know what might be available for him at his age for treatment but I don't want to immediately assume there is nothing available.  I discussed all of this with Marcin and his 2 daughters..      Naresh Turner MD , 7/12/2018    CC:

## 2018-07-13 ENCOUNTER — TELEPHONE (OUTPATIENT)
Dept: CARDIOLOGY | Facility: CLINIC | Age: 78
End: 2018-07-13

## 2018-07-13 NOTE — TELEPHONE ENCOUNTER
Called and let patient know his heart pumping function was normal and only mild mr should on test. Follow up as scheduled.

## 2018-07-18 DIAGNOSIS — C90.30 PLASMACYTOMA (HCC): ICD-10-CM

## 2018-07-19 ENCOUNTER — TELEPHONE (OUTPATIENT)
Dept: ONCOLOGY | Facility: CLINIC | Age: 78
End: 2018-07-19

## 2018-07-19 ENCOUNTER — APPOINTMENT (OUTPATIENT)
Dept: ONCOLOGY | Facility: HOSPITAL | Age: 78
End: 2018-07-19

## 2018-07-19 NOTE — TELEPHONE ENCOUNTER
----- Message from Yael OVIEDO Zoe sent at 7/19/2018 12:07 PM EDT -----  Regarding: ELHAM-CALL DR. VERONICA  Contact: [phone]  Dr. Veronica with  wants Dr. Turner to call her at the number above about this patient.

## 2018-07-26 ENCOUNTER — APPOINTMENT (OUTPATIENT)
Dept: ONCOLOGY | Facility: HOSPITAL | Age: 78
End: 2018-07-26

## 2018-07-31 ENCOUNTER — OFFICE VISIT (OUTPATIENT)
Dept: ONCOLOGY | Facility: CLINIC | Age: 78
End: 2018-07-31

## 2018-07-31 VITALS
SYSTOLIC BLOOD PRESSURE: 128 MMHG | BODY MASS INDEX: 21.77 KG/M2 | WEIGHT: 165 LBS | DIASTOLIC BLOOD PRESSURE: 61 MMHG | HEART RATE: 61 BPM | TEMPERATURE: 98.2 F | RESPIRATION RATE: 16 BRPM

## 2018-07-31 DIAGNOSIS — C90.00 MULTIPLE MYELOMA, REMISSION STATUS UNSPECIFIED (HCC): Primary | ICD-10-CM

## 2018-07-31 PROCEDURE — 99213 OFFICE O/P EST LOW 20 MIN: CPT | Performed by: INTERNAL MEDICINE

## 2018-07-31 NOTE — PROGRESS NOTES
Chief Complaint   Follow-up recently diagnosed acute leukemia in the setting of prior myelodysplasia, in the setting of multiple myeloma-status post treatment    PROBLEM LIST   Patient Active Problem List   Diagnosis   • CAD (coronary artery disease)   • Dyslipidemia   • Right hip pain   • Arthritis   • Cellulitis of left lower extremity   • Left carotid bruit   • Plasmacytoma (CMS/HCC)   • Brain cancer (CMS/HCC)   • Neck pain   • Anemia   • Multiple myeloma (CMS/HCC)       HISTORY OF PRESENT ILLNESS:   This very pleasant 78-year-old gentleman returns.  Last week he began Dacogen, given 5 days in a row and he tolerated it very nicely.  He's had no fevers chills nausea or vomiting.  He's been treated by hematology at [facility].  I had referred him there when his marrow showed that he had developed leukemia    Past Medical History, Past Surgical History, Social History, Family History have been reviewed and are without significant changes except as mentioned.      Medications:      Current Outpatient Prescriptions:   •  allopurinol (ZYLOPRIM) 300 MG tablet, Take 1 tablet by mouth Daily., Disp: 30 tablet, Rfl: 11  •  atorvastatin (LIPITOR) 20 MG tablet, Take  by mouth. 1 QHS, Disp: , Rfl:   •  dronabinol (MARINOL) 10 MG capsule, Take 1 capsule by mouth 2 (Two) Times a Day Before Meals., Disp: 30 capsule, Rfl: 1  •  dronabinol (MARINOL) 5 MG capsule, Take 10 mg by mouth every night at bedtime., Disp: , Rfl: 1  •  glimepiride (AMARYL) 2 MG tablet, Take  by mouth daily., Disp: , Rfl:   •  latanoprost (XALATAN) 0.005 % ophthalmic solution, Apply  to eye. qhs, Disp: , Rfl:   •  metoprolol succinate XL (TOPROL-XL) 25 MG 24 hr tablet, TAKE 1 TABLET BY MOUTH  DAILY, Disp: 90 tablet, Rfl: 2  •  nitroglycerin (NITROSTAT) 0.4 MG SL tablet, Place 1 tablet under the tongue every 5 (five) minutes as needed for chest pain., Disp: 25 tablet, Rfl: 6  •  ondansetron (ZOFRAN) 8 MG tablet, Take 1 tablet by mouth 3 (Three) Times a Day As  "Needed for Nausea or Vomiting., Disp: 30 tablet, Rfl: 5  •  sulfamethoxazole-trimethoprim (BACTRIM DS) 800-160 MG per tablet, 1 tab PO 3x/week on M-W-F, Disp: 12 tablet, Rfl: 5  •  ZETIA 10 MG tablet, TAKE 1 TABLET BY MOUTH AT  BEDTIME, Disp: 90 tablet, Rfl: 3  No current facility-administered medications for this visit.     Facility-Administered Medications Ordered in Other Visits:   •  sodium chloride 0.9 % infusion 250 mL, 250 mL, Intravenous, PRN, Inocencia Felipe MD    ALLERGIES:    Allergies   Allergen Reactions   • Penicillins Other (See Comments)     \"broke out in sweat\"   • Simvastatin Myalgia     Swelling and pain in shoulder   • Voltaren [Diclofenac Sodium]        ROS:  Review of Systems   Constitutional: Negative for activity change and appetite change.        Fatigue present   HENT: Negative for mouth sores, sinus pressure and voice change.    Eyes: Negative for visual disturbance.   Respiratory: Negative for shortness of breath.    Cardiovascular: Negative for chest pain.   Gastrointestinal: Negative for abdominal pain and vomiting.        Appetite so-so.  Continues to lose weight but not as quickly as he had been   Genitourinary: Negative for dysuria.   Musculoskeletal: Negative for arthralgias and myalgias.   Skin: Negative for color change.   Neurological: Negative for dizziness, syncope and headaches.   Hematological: Negative for adenopathy.        Patient is pancytopenic associated with his disease and his treatment   Psychiatric/Behavioral: Negative for confusion, sleep disturbance and suicidal ideas. The patient is not nervous/anxious.            Objective:    /61 Comment: right  Pulse 61   Temp 98.2 °F (36.8 °C) Comment (Src): Temporal  Resp 16 Comment: 16  Wt 74.8 kg (165 lb)   BMI 21.77 kg/m²     Physical Exam   Constitutional: He is oriented to person, place, and time. He appears well-developed and well-nourished. No distress.   Although somewhat pale, the patient is alert " awake quite conversant and quite insightful.  He smiles readily.  He has no petechiae or purpura present   HENT:   Head: Normocephalic.   Mouth/Throat: Oropharynx is clear and moist.   Eyes: Conjunctivae and EOM are normal. No scleral icterus.   Neck: Normal range of motion. Neck supple. No thyromegaly present.   Cardiovascular: Normal rate, regular rhythm and normal heart sounds.    No murmur heard.  Pulmonary/Chest: Effort normal and breath sounds normal. He has no wheezes. He has no rales.   Abdominal: Soft. Bowel sounds are normal. He exhibits no distension and no mass. There is no tenderness.   Musculoskeletal: He exhibits no edema or tenderness.   Lymphadenopathy:     He has no cervical adenopathy.   Neurological: He is alert and oriented to person, place, and time. He displays normal reflexes. No cranial nerve deficit.   Skin: Skin is warm and dry. No rash noted.   Psychiatric: He has a normal mood and affect. Judgment normal.   Vitals reviewed.             RECENT LABS:  Hematology WBC   Date Value Ref Range Status   07/12/2018 3.50 3.50 - 10.80 10*3/mm3 Final     Hemoglobin   Date Value Ref Range Status   07/12/2018 8.7 (L) 13.1 - 17.5 g/dL Final     Hematocrit   Date Value Ref Range Status   07/12/2018 26.2 (L) 38.9 - 50.9 % Final     MCV   Date Value Ref Range Status   07/12/2018 98.0 80.0 - 99.0 fL Final     RDW   Date Value Ref Range Status   07/12/2018 15.9 (H) 11.3 - 14.5 % Final     MPV   Date Value Ref Range Status   07/12/2018 9.1 6.0 - 12.0 fL Final     Platelets   Date Value Ref Range Status   07/12/2018 36 (L) 150 - 450 10*3/mm3 Final     Comment:     Verified by repeat analysis.      Immature Grans %   Date Value Ref Range Status   07/09/2018 0.3 0.0 - 0.6 % Final     Neutrophils, Absolute   Date Value Ref Range Status   07/12/2018 0.90 (L) 1.50 - 8.30 10*3/mm3 Final     Lymphocytes, Absolute   Date Value Ref Range Status   07/12/2018 2.10 0.60 - 4.80 10*3/mm3 Final     Monocytes, Absolute    Date Value Ref Range Status   07/12/2018 0.60 0.00 - 1.00 10*3/mm3 Final     Eosinophils, Absolute   Date Value Ref Range Status   07/09/2018 0.00 0.00 - 0.30 10*3/mm3 Final     Basophils, Absolute   Date Value Ref Range Status   07/09/2018 0.01 0.00 - 0.20 10*3/mm3 Final     Immature Grans, Absolute   Date Value Ref Range Status   07/09/2018 0.01 0.00 - 0.03 10*3/mm3 Final     nRBC   Date Value Ref Range Status   03/19/2015 0.0  Final       Glucose   Date Value Ref Range Status   06/14/2018 203 (H) 70 - 100 mg/dL Final     Sodium   Date Value Ref Range Status   06/14/2018 137 132 - 146 mmol/L Final     Potassium   Date Value Ref Range Status   06/14/2018 4.5 3.5 - 5.5 mmol/L Final     CO2   Date Value Ref Range Status   06/14/2018 26.0 20.0 - 31.0 mmol/L Final     Chloride   Date Value Ref Range Status   06/14/2018 105 99 - 109 mmol/L Final     Anion Gap   Date Value Ref Range Status   06/14/2018 6.0 3.0 - 11.0 mmol/L Final     Creatinine   Date Value Ref Range Status   07/12/2018 0.80 0.60 - 1.30 mg/dL Final     Comment:     Serial Number: [id number]:  [id number]     BUN   Date Value Ref Range Status   06/14/2018 15 9 - 23 mg/dL Final     BUN/Creatinine Ratio   Date Value Ref Range Status   06/14/2018 14.9 7.0 - 25.0 Final     Calcium   Date Value Ref Range Status   06/14/2018 8.7 8.7 - 10.4 mg/dL Final     eGFR Non  Amer   Date Value Ref Range Status   06/14/2018 71 >60 mL/min/1.73 Final     Alkaline Phosphatase   Date Value Ref Range Status   06/14/2018 50 25 - 100 U/L Final     Total Protein   Date Value Ref Range Status   06/14/2018 5.7 5.7 - 8.2 g/dL Final     ALT (SGPT)   Date Value Ref Range Status   06/14/2018 15 7 - 40 U/L Final     AST (SGOT)   Date Value Ref Range Status   06/14/2018 15 0 - 33 U/L Final     Total Bilirubin   Date Value Ref Range Status   06/14/2018 0.6 0.3 - 1.2 mg/dL Final     Albumin   Date Value Ref Range Status   06/14/2018 3.70 3.20 - 4.80 g/dL Final     Globulin   Date  Value Ref Range Status   06/14/2018 2.0 gm/dL Final     A/G Ratio   Date Value Ref Range Status   06/05/2018 1.4 0.7 - 1.7 Final          Perf Status:2    Assessment/Plan    Diagnoses and all orders for this visit:    Multiple myeloma, remission status unspecified (CMS/HCC)      AML.  The patient is tolerating his initial therapy well.  He will be receiving his care from his hematologist at [facility].  I spent some time talking with him and his family.  They're wonderful people and I have known him for close to 20 years now.  I gave him my cell phone number and asked him to keep me informed how he is doing.      Naresh Turner MD , 7/31/2018    CC:

## 2018-09-10 LAB
DX PRELIMINARY: NORMAL
LAB AP ASPIRATE SMEAR: NORMAL
LAB AP BONE MARROW MORPHOLOGY ANALYSIS, ADDENDUM: NORMAL
LAB AP CASE REPORT: NORMAL
LAB AP CBC AND DIFFERENTIAL: NORMAL
LAB AP CLINICAL INFORMATION: NORMAL
LAB AP CLOT SECTION: NORMAL
LAB AP CORE BIOPSY: NORMAL
LAB AP CYTOGENETICS REPORT,ADDENDUM: NORMAL
LAB AP DIAGNOSIS COMMENT: NORMAL
LAB AP FISH ANALYSIS REPORT,ADDENDUM: NORMAL
LAB AP FLOW CYTOMETRY SUMMARY: NORMAL
LAB AP SPECIAL STAINS: NORMAL
PATH REPORT.FINAL DX SPEC: NORMAL
PATH REPORT.GROSS SPEC: NORMAL

## 2018-12-31 RX ORDER — METOPROLOL SUCCINATE 25 MG/1
TABLET, EXTENDED RELEASE ORAL
Qty: 90 TABLET | Refills: 2 | Status: SHIPPED | OUTPATIENT
Start: 2018-12-31 | End: 2019-01-01 | Stop reason: SDUPTHER

## 2019-01-01 ENCOUNTER — OFFICE VISIT (OUTPATIENT)
Dept: INTERNAL MEDICINE | Facility: CLINIC | Age: 79
End: 2019-01-01

## 2019-01-01 ENCOUNTER — TELEPHONE (OUTPATIENT)
Dept: INTERNAL MEDICINE | Facility: CLINIC | Age: 79
End: 2019-01-01

## 2019-01-01 ENCOUNTER — OFFICE VISIT (OUTPATIENT)
Dept: CARDIOLOGY | Facility: CLINIC | Age: 79
End: 2019-01-01

## 2019-01-01 ENCOUNTER — HOSPITAL ENCOUNTER (EMERGENCY)
Facility: HOSPITAL | Age: 79
Discharge: HOME | End: 2019-08-16
Attending: EMERGENCY MEDICINE | Admitting: EMERGENCY MEDICINE

## 2019-01-01 ENCOUNTER — APPOINTMENT (OUTPATIENT)
Dept: CT IMAGING | Facility: HOSPITAL | Age: 79
End: 2019-01-01

## 2019-01-01 VITALS
SYSTOLIC BLOOD PRESSURE: 105 MMHG | WEIGHT: 158 LBS | DIASTOLIC BLOOD PRESSURE: 60 MMHG | BODY MASS INDEX: 21.4 KG/M2 | HEIGHT: 72 IN | HEART RATE: 84 BPM

## 2019-01-01 VITALS
OXYGEN SATURATION: 99 % | WEIGHT: 154 LBS | HEIGHT: 73 IN | HEART RATE: 90 BPM | SYSTOLIC BLOOD PRESSURE: 131 MMHG | TEMPERATURE: 97.3 F | BODY MASS INDEX: 20.41 KG/M2 | DIASTOLIC BLOOD PRESSURE: 73 MMHG | RESPIRATION RATE: 16 BRPM

## 2019-01-01 VITALS
WEIGHT: 154.4 LBS | SYSTOLIC BLOOD PRESSURE: 108 MMHG | HEIGHT: 73 IN | BODY MASS INDEX: 20.46 KG/M2 | HEART RATE: 70 BPM | OXYGEN SATURATION: 97 % | DIASTOLIC BLOOD PRESSURE: 50 MMHG

## 2019-01-01 VITALS
SYSTOLIC BLOOD PRESSURE: 122 MMHG | DIASTOLIC BLOOD PRESSURE: 60 MMHG | HEIGHT: 73 IN | BODY MASS INDEX: 19.88 KG/M2 | WEIGHT: 150 LBS | HEART RATE: 92 BPM

## 2019-01-01 DIAGNOSIS — R91.8 LUNG INFILTRATE: ICD-10-CM

## 2019-01-01 DIAGNOSIS — E78.2 MIXED HYPERLIPIDEMIA: ICD-10-CM

## 2019-01-01 DIAGNOSIS — E11.9 TYPE 2 DIABETES MELLITUS WITHOUT COMPLICATION, WITHOUT LONG-TERM CURRENT USE OF INSULIN (HCC): ICD-10-CM

## 2019-01-01 DIAGNOSIS — D69.6 THROMBOCYTOPENIA, UNSPECIFIED (HCC): ICD-10-CM

## 2019-01-01 DIAGNOSIS — I10 BENIGN ESSENTIAL HYPERTENSION: Primary | ICD-10-CM

## 2019-01-01 DIAGNOSIS — T45.1X5A CHEMOTHERAPY-INDUCED NEUTROPENIA (HCC): ICD-10-CM

## 2019-01-01 DIAGNOSIS — I25.10 CORONARY ARTERY DISEASE INVOLVING NATIVE CORONARY ARTERY OF NATIVE HEART WITHOUT ANGINA PECTORIS: Primary | ICD-10-CM

## 2019-01-01 DIAGNOSIS — D70.1 CHEMOTHERAPY-INDUCED NEUTROPENIA (HCC): ICD-10-CM

## 2019-01-01 DIAGNOSIS — C92.02 ACUTE MYELOID LEUKEMIA IN RELAPSE (HCC): ICD-10-CM

## 2019-01-01 DIAGNOSIS — C90.31 SOLITARY PLASMACYTOMA IN REMISSION (HCC): ICD-10-CM

## 2019-01-01 DIAGNOSIS — Z85.6 HISTORY OF LEUKEMIA: ICD-10-CM

## 2019-01-01 DIAGNOSIS — I10 ESSENTIAL HYPERTENSION: ICD-10-CM

## 2019-01-01 DIAGNOSIS — C92.00 ACUTE MYELOID LEUKEMIA NOT HAVING ACHIEVED REMISSION (HCC): ICD-10-CM

## 2019-01-01 DIAGNOSIS — C90.02 MULTIPLE MYELOMA IN RELAPSE (HCC): ICD-10-CM

## 2019-01-01 DIAGNOSIS — K59.00 CONSTIPATION, UNSPECIFIED CONSTIPATION TYPE: Primary | ICD-10-CM

## 2019-01-01 LAB
ALBUMIN SERPL-MCNC: 3.7 G/DL (ref 3.5–5.2)
ALBUMIN/GLOB SERPL: 1.2 G/DL
ALP SERPL-CCNC: 66 U/L (ref 39–117)
ALT SERPL W P-5'-P-CCNC: 13 U/L (ref 1–41)
ANION GAP SERPL CALCULATED.3IONS-SCNC: 10 MMOL/L (ref 5–15)
AST SERPL-CCNC: 16 U/L (ref 1–40)
BACTERIA UR QL AUTO: ABNORMAL /HPF
BASOPHILS # BLD AUTO: 0 10*3/MM3 (ref 0–0.2)
BASOPHILS NFR BLD AUTO: 0 % (ref 0–1.5)
BILIRUB SERPL-MCNC: 0.7 MG/DL (ref 0.2–1.2)
BILIRUB UR QL STRIP: NEGATIVE
BUN BLD-MCNC: 21 MG/DL (ref 8–23)
BUN/CREAT SERPL: 29.6 (ref 7–25)
CALCIUM SPEC-SCNC: 9.3 MG/DL (ref 8.6–10.5)
CHLORIDE SERPL-SCNC: 98 MMOL/L (ref 98–107)
CLARITY UR: ABNORMAL
CO2 SERPL-SCNC: 27 MMOL/L (ref 22–29)
COLOR UR: YELLOW
CREAT BLD-MCNC: 0.71 MG/DL (ref 0.76–1.27)
D-LACTATE SERPL-SCNC: 1.2 MMOL/L (ref 0.5–2)
DEPRECATED RDW RBC AUTO: 55.5 FL (ref 37–54)
EOSINOPHIL # BLD AUTO: 0 10*3/MM3 (ref 0–0.4)
EOSINOPHIL NFR BLD AUTO: 0 % (ref 0.3–6.2)
ERYTHROCYTE [DISTWIDTH] IN BLOOD BY AUTOMATED COUNT: 16.8 % (ref 12.3–15.4)
GFR SERPL CREATININE-BSD FRML MDRD: 106 ML/MIN/1.73
GLOBULIN UR ELPH-MCNC: 3 GM/DL
GLUCOSE BLD-MCNC: 192 MG/DL (ref 65–99)
GLUCOSE UR STRIP-MCNC: NEGATIVE MG/DL
HCT VFR BLD AUTO: 23.3 % (ref 37.5–51)
HGB BLD-MCNC: 7.6 G/DL (ref 13–17.7)
HGB UR QL STRIP.AUTO: NEGATIVE
HOLD SPECIMEN: NORMAL
HOLD SPECIMEN: NORMAL
HYALINE CASTS UR QL AUTO: ABNORMAL /LPF
IMM GRANULOCYTES # BLD AUTO: 0.02 10*3/MM3 (ref 0–0.05)
IMM GRANULOCYTES NFR BLD AUTO: 2.6 % (ref 0–0.5)
KETONES UR QL STRIP: NEGATIVE
LEUKOCYTE ESTERASE UR QL STRIP.AUTO: NEGATIVE
LIPASE SERPL-CCNC: 4 U/L (ref 13–60)
LYMPHOCYTES # BLD AUTO: 0.57 10*3/MM3 (ref 0.7–3.1)
LYMPHOCYTES NFR BLD AUTO: 75 % (ref 19.6–45.3)
MCH RBC QN AUTO: 29.9 PG (ref 26.6–33)
MCHC RBC AUTO-ENTMCNC: 32.6 G/DL (ref 31.5–35.7)
MCV RBC AUTO: 91.7 FL (ref 79–97)
MONOCYTES # BLD AUTO: 0.08 10*3/MM3 (ref 0.1–0.9)
MONOCYTES NFR BLD AUTO: 10.5 % (ref 5–12)
NEUTROPHILS # BLD AUTO: 0.09 10*3/MM3 (ref 1.7–7)
NEUTROPHILS NFR BLD AUTO: 11.9 % (ref 42.7–76)
NITRITE UR QL STRIP: NEGATIVE
NRBC BLD AUTO-RTO: 0 /100 WBC (ref 0–0.2)
PH UR STRIP.AUTO: 6.5 [PH] (ref 5–8)
PLATELET # BLD AUTO: 39 10*3/MM3 (ref 140–450)
PMV BLD AUTO: 11.7 FL (ref 6–12)
POTASSIUM BLD-SCNC: 4.3 MMOL/L (ref 3.5–5.2)
PROT SERPL-MCNC: 6.7 G/DL (ref 6–8.5)
PROT UR QL STRIP: ABNORMAL
RBC # BLD AUTO: 2.54 10*6/MM3 (ref 4.14–5.8)
RBC # UR: ABNORMAL /HPF
RBC MORPH BLD: NORMAL
REF LAB TEST METHOD: ABNORMAL
SMALL PLATELETS BLD QL SMEAR: NORMAL
SODIUM BLD-SCNC: 135 MMOL/L (ref 136–145)
SP GR UR STRIP: 1.02 (ref 1–1.03)
SQUAMOUS #/AREA URNS HPF: ABNORMAL /HPF
UROBILINOGEN UR QL STRIP: ABNORMAL
WBC MORPH BLD: NORMAL
WBC NRBC COR # BLD: 0.76 10*3/MM3 (ref 3.4–10.8)
WBC UR QL AUTO: ABNORMAL /HPF
WHOLE BLOOD HOLD SPECIMEN: NORMAL
WHOLE BLOOD HOLD SPECIMEN: NORMAL

## 2019-01-01 PROCEDURE — 99214 OFFICE O/P EST MOD 30 MIN: CPT | Performed by: INTERNAL MEDICINE

## 2019-01-01 PROCEDURE — 85025 COMPLETE CBC W/AUTO DIFF WBC: CPT | Performed by: PHYSICIAN ASSISTANT

## 2019-01-01 PROCEDURE — 83690 ASSAY OF LIPASE: CPT | Performed by: PHYSICIAN ASSISTANT

## 2019-01-01 PROCEDURE — 74176 CT ABD & PELVIS W/O CONTRAST: CPT

## 2019-01-01 PROCEDURE — 83605 ASSAY OF LACTIC ACID: CPT | Performed by: PHYSICIAN ASSISTANT

## 2019-01-01 PROCEDURE — G0008 ADMIN INFLUENZA VIRUS VAC: HCPCS | Performed by: INTERNAL MEDICINE

## 2019-01-01 PROCEDURE — 81001 URINALYSIS AUTO W/SCOPE: CPT | Performed by: PHYSICIAN ASSISTANT

## 2019-01-01 PROCEDURE — 99284 EMERGENCY DEPT VISIT MOD MDM: CPT

## 2019-01-01 PROCEDURE — 85007 BL SMEAR W/DIFF WBC COUNT: CPT | Performed by: PHYSICIAN ASSISTANT

## 2019-01-01 PROCEDURE — 80053 COMPREHEN METABOLIC PANEL: CPT | Performed by: PHYSICIAN ASSISTANT

## 2019-01-01 PROCEDURE — 90653 IIV ADJUVANT VACCINE IM: CPT | Performed by: INTERNAL MEDICINE

## 2019-01-01 RX ORDER — LEVOFLOXACIN 500 MG/1
500 TABLET, FILM COATED ORAL DAILY
COMMUNITY
End: 2019-01-01

## 2019-01-01 RX ORDER — AZITHROMYCIN 250 MG/1
TABLET, FILM COATED ORAL
Qty: 6 TABLET | Refills: 0 | Status: SHIPPED | OUTPATIENT
Start: 2019-01-01 | End: 2019-01-01

## 2019-01-01 RX ORDER — FLUCONAZOLE 200 MG/1
400 TABLET ORAL DAILY
COMMUNITY

## 2019-01-01 RX ORDER — SODIUM CHLORIDE 0.9 % (FLUSH) 0.9 %
10 SYRINGE (ML) INJECTION AS NEEDED
Status: DISCONTINUED | OUTPATIENT
Start: 2019-01-01 | End: 2019-01-01 | Stop reason: HOSPADM

## 2019-01-01 RX ORDER — GLIMEPIRIDE 2 MG/1
TABLET ORAL
Qty: 270 TABLET | Refills: 3 | Status: SHIPPED | OUTPATIENT
Start: 2019-01-01 | End: 2020-01-01

## 2019-01-01 RX ORDER — METOPROLOL SUCCINATE 25 MG/1
TABLET, EXTENDED RELEASE ORAL
Qty: 90 TABLET | Refills: 2 | Status: SHIPPED | OUTPATIENT
Start: 2019-01-01

## 2019-01-01 RX ADMIN — SODIUM CHLORIDE 1000 ML: 9 INJECTION, SOLUTION INTRAVENOUS at 15:48

## 2019-01-01 RX ADMIN — SODIUM CHLORIDE, PRESERVATIVE FREE 10 ML: 5 INJECTION INTRAVENOUS at 21:34

## 2019-01-01 RX ADMIN — SODIUM CHLORIDE, PRESERVATIVE FREE 300 UNITS: 5 INJECTION INTRAVENOUS at 21:34

## 2019-02-11 PROBLEM — E78.00 HYPERCHOLESTEROLEMIA: Status: ACTIVE | Noted: 2019-02-11

## 2019-02-11 PROBLEM — Z85.79 HISTORY OF PLASMACYTOMA: Status: ACTIVE | Noted: 2019-02-11

## 2019-02-11 PROBLEM — E78.2 MIXED HYPERLIPIDEMIA: Status: ACTIVE | Noted: 2019-02-11

## 2019-02-11 PROBLEM — C92.00 ACUTE MYELOID LEUKEMIA (HCC): Status: ACTIVE | Noted: 2019-02-11

## 2019-02-11 PROBLEM — H61.20 IMPACTED CERUMEN: Status: ACTIVE | Noted: 2019-02-11

## 2019-02-11 PROBLEM — I20.9 ANGINA PECTORIS (HCC): Status: ACTIVE | Noted: 2019-02-11

## 2019-02-11 PROBLEM — D75.9 BONE MARROW DISORDER: Status: ACTIVE | Noted: 2019-02-11

## 2019-02-11 PROBLEM — M54.9 BACKACHE: Status: ACTIVE | Noted: 2019-02-11

## 2019-02-11 PROBLEM — K62.9 ANORECTAL DISORDER: Status: ACTIVE | Noted: 2019-02-11

## 2019-02-11 PROBLEM — N40.0 BENIGN PROSTATIC HYPERPLASIA WITHOUT URINARY OBSTRUCTION: Status: ACTIVE | Noted: 2019-02-11

## 2019-02-11 PROBLEM — E11.9 DIABETES MELLITUS, TYPE 2 (HCC): Status: ACTIVE | Noted: 2019-02-11

## 2019-02-11 PROBLEM — K57.32 DIVERTICULITIS OF COLON: Status: ACTIVE | Noted: 2019-02-11

## 2019-02-11 PROBLEM — J40 BRONCHITIS: Status: ACTIVE | Noted: 2019-02-11

## 2019-02-11 PROBLEM — C80.1 MALIGNANT NEOPLASTIC DISEASE (HCC): Status: ACTIVE | Noted: 2019-02-11

## 2019-02-11 PROBLEM — M19.90 OSTEOARTHRITIS: Status: ACTIVE | Noted: 2019-02-11

## 2019-02-11 PROBLEM — R63.6 UNDERWEIGHT: Status: ACTIVE | Noted: 2019-02-11

## 2019-02-11 PROBLEM — L03.119 CELLULITIS AND ABSCESS OF LOWER EXTREMITY: Status: ACTIVE | Noted: 2019-02-11

## 2019-02-11 PROBLEM — M47.812 CERVICAL ARTHRITIS: Status: ACTIVE | Noted: 2019-02-11

## 2019-02-11 PROBLEM — I10 BENIGN ESSENTIAL HYPERTENSION: Status: ACTIVE | Noted: 2019-02-11

## 2019-02-11 PROBLEM — L02.419 CELLULITIS AND ABSCESS OF LOWER EXTREMITY: Status: ACTIVE | Noted: 2019-02-11

## 2019-02-11 PROBLEM — R05.9 COUGH: Status: ACTIVE | Noted: 2019-02-11

## 2019-02-12 ENCOUNTER — OFFICE VISIT (OUTPATIENT)
Dept: INTERNAL MEDICINE | Facility: CLINIC | Age: 79
End: 2019-02-12

## 2019-02-12 VITALS
WEIGHT: 156 LBS | BODY MASS INDEX: 20.67 KG/M2 | SYSTOLIC BLOOD PRESSURE: 128 MMHG | DIASTOLIC BLOOD PRESSURE: 60 MMHG | HEIGHT: 73 IN | HEART RATE: 76 BPM

## 2019-02-12 DIAGNOSIS — C90.30 PLASMACYTOMA (HCC): ICD-10-CM

## 2019-02-12 DIAGNOSIS — C90.01 MULTIPLE MYELOMA IN REMISSION (HCC): ICD-10-CM

## 2019-02-12 DIAGNOSIS — C92.02 ACUTE MYELOID LEUKEMIA IN RELAPSE (HCC): Primary | ICD-10-CM

## 2019-02-12 DIAGNOSIS — E78.2 MIXED HYPERLIPIDEMIA: ICD-10-CM

## 2019-02-12 DIAGNOSIS — I10 BENIGN ESSENTIAL HYPERTENSION: ICD-10-CM

## 2019-02-12 DIAGNOSIS — D64.81 ANEMIA DUE TO ANTINEOPLASTIC CHEMOTHERAPY: ICD-10-CM

## 2019-02-12 DIAGNOSIS — T45.1X5A ANEMIA DUE TO ANTINEOPLASTIC CHEMOTHERAPY: ICD-10-CM

## 2019-02-12 PROBLEM — M10.9 GOUT: Status: ACTIVE | Noted: 2019-02-12

## 2019-02-12 PROCEDURE — 99214 OFFICE O/P EST MOD 30 MIN: CPT | Performed by: INTERNAL MEDICINE

## 2019-02-12 NOTE — PROGRESS NOTES
[facility]     aMrcin Joyce  1940   [id number]      Patient Care Team:  Naresh Gutiérrez MD as PCP - General  Naresh Gutiérrez MD as PCP - Family Medicine  Naresh Turner MD (Inactive) as Consulting Physician (Hematology and Oncology)    Chief Complaint::   Chief Complaint   Patient presents with   • Diabetes            HPI  Patient is an 78-year-old male with recent development of AML currently being treated at [facility] was unable to have his last chemotherapy treatment because of white count 1300 hemoglobin was 8.8 he has lost approximately 30 pounds but is overall doing recently well drinking 5-6 cans of Ensure Plus early with a past history of plasmacytoma of the left hip multiple myeloma diabetes type 2 mixed hyperlipidemia degenerative arthritis disease and mild essential hypertension.    Chronic Conditions:  Chronic conditions of the plasmacytoma multiple myeloma are stable and in remission daily ML is active and is getting chemotherapy.  We have discontinued the therapy for the mixed hyperlipidemia he is on therapy for the diabetes type 2    Patient Active Problem List   Diagnosis   • CAD (coronary artery disease)   • Dyslipidemia   • Right hip pain   • Arthritis   • Cellulitis of left lower extremity   • Left carotid bruit   • Plasmacytoma (CMS/HCC)   • Brain cancer (CMS/HCC)   • Neck pain   • Anemia   • Multiple myeloma (CMS/HCC)   • Acute myeloid leukemia (CMS/HCC)   • Angina pectoris (CMS/HCC)   • Anorectal disorder   • Backache   • Benign essential hypertension   • Benign prostatic hyperplasia without urinary obstruction   • Bone marrow disorder   • Bronchitis   • Cellulitis and abscess of lower extremity   • Cervical arthritis   • Cough   • Diabetes mellitus, type 2 (CMS/HCC)   • Diverticulitis of colon   • History of plasmacytoma   • Hypercholesterolemia   • Impacted cerumen   • Malignant neoplastic disease (CMS/HCC)   • Mixed hyperlipidemia   • Osteoarthritis   • Underweight   • Gout         Past Medical History:   Diagnosis Date   • Anxiety    • Arthritis    • Benign prostatic hyperplasia    • CAD (coronary artery disease)    • Carotid bruit    • Cellulitis of left lower extremity    • Colon, diverticulosis 09/23/2013   • Coronary artery disease 2003   • Diabetes mellitus (CMS/HCC)    • Diverticulosis 2004   • Dyslipidemia    • Hyperlipidemia    • Hypertension    • Left carotid bruit    • Meningioma (CMS/HCC)    • Multiple myeloma (CMS/HCC)    • Myeloma (CMS/HCC)     SINGLE    • OA (osteoarthritis)    • 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 Burns   • OTHER SURGICAL HISTORY Left     Shoulder repair   • OTHER SURGICAL HISTORY      Meningioma excision.   • OTHER SURGICAL HISTORY  05/16/2013    STRESS ECHO- ABNORMAL    • OTHER SURGICAL HISTORY      ANGIOPLASTY STENTS LAD CIRC    • PERCUTANEOUS BALLOON VALVULOPLASTY  2003    PERCUTANEOUS TRANSLUMINAL BALLOON ANGIOPLASTY WITH INSERTION OF STENT INTO CORONARY ARTERY   • PILONIDAL CYST DRAINAGE     • PILONIDAL CYSTECTOMY     • ROTATOR CUFF REPAIR Left     ROTATOR CUFF TEAR   • US CAROTID UNILATERAL  02/16/2016       Family History   Problem Relation Age of Onset   • Stroke Mother    • Stroke Father    • No Known Problems Sister    • Hypertension Other    • Hyperlipidemia Other    • Stroke Other    • Melanoma Other        Social History     Socioeconomic History   • Marital status:      Spouse name: Not on file   • Number of children: Not on file   • Years of education: Not on file   • Highest education level: Not on file   Social Needs   • Financial resource strain: Not on file   •  "Food insecurity - worry: Not on file   • Food insecurity - inability: Not on file   • Transportation needs - medical: Not on file   • Transportation needs - non-medical: Not on file   Occupational History   • Not on file   Tobacco Use   • Smoking status: Never Smoker   • Smokeless tobacco: Never Used   Substance and Sexual Activity   • Alcohol use: No   • Drug use: No   • Sexual activity: Defer   Other Topics Concern   • Not on file   Social History Narrative    Pinky Joyce, dtr and saskia Terryr with pt       Allergies   Allergen Reactions   • Diclofenac Other (See Comments)     UNKNOWN    • Levofloxacin Other (See Comments)     LEVAQUIN    • Metformin Other (See Comments)     OTHER   • Metronidazole Other (See Comments)     FLAGYL    • Niacin Other (See Comments)     NIASPAN    • Penicillins Other (See Comments)     \"broke out in sweat\"   • Simvastatin Myalgia     Swelling and pain in shoulder   • Voltaren [Diclofenac Sodium]        Review of Systems    HEENT: Complains of mild headache and lightheadedness denies visual or hearing change  NECK:  Denies dysphagia although he has very poor appetite and is drinking only the liquid Ensure Plus approximately 1700 dell per day  CHEST: Denies cough or wheeze is mildly short of breath because of the anemia  CARDIAC: No chest pain or palpitations  ABD: Denies nausea vomiting or abdominal pain  : Denies dysuria or frequency  NEURO:  Denies syncope or concussion or neuropathy  PSYCH:  Complains of mild anxiety denies depression  EXTREM: Minimal arthritic change denies edema    Vital Signs  Vitals:    02/12/19 1432   BP: 128/60   Pulse: 76   Weight: 70.8 kg (156 lb)   Height: 185.4 cm (72.99\")   PainSc: 0-No pain         Current Outpatient Medications:   •  allopurinol (ZYLOPRIM) 300 MG tablet, Take 1 tablet by mouth Daily., Disp: 30 tablet, Rfl: 11  •  dronabinol (MARINOL) 10 MG capsule, Take 1 capsule by mouth 2 (Two) Times a Day Before Meals., Disp: 30 capsule, Rfl: " 1  •  glimepiride (AMARYL) 2 MG tablet, Take 4 mg by mouth Daily. 4 mg in the am and 2 mg in the pm, Disp: , Rfl:   •  latanoprost (XALATAN) 0.005 % ophthalmic solution, Apply  to eye. qhs, Disp: , Rfl:   •  metFORMIN (GLUCOPHAGE) 500 MG tablet, Take 500 mg by mouth 2 (Two) Times a Day With Meals., Disp: , Rfl:   •  metoprolol succinate XL (TOPROL-XL) 25 MG 24 hr tablet, TAKE 1 TABLET BY MOUTH  DAILY, Disp: 90 tablet, Rfl: 2  •  nitroglycerin (NITROSTAT) 0.4 MG SL tablet, Place 1 tablet under the tongue every 5 (five) minutes as needed for chest pain., Disp: 25 tablet, Rfl: 6  •  sulfamethoxazole-trimethoprim (BACTRIM DS) 800-160 MG per tablet, 1 tab PO 3x/week on M-W-F, Disp: 12 tablet, Rfl: 5  •  dronabinol (MARINOL) 5 MG capsule, Take 10 mg by mouth every night at bedtime., Disp: , Rfl: 1  •  ondansetron (ZOFRAN) 8 MG tablet, Take 1 tablet by mouth 3 (Three) Times a Day As Needed for Nausea or Vomiting., Disp: 30 tablet, Rfl: 5  No current facility-administered medications for this visit.     Facility-Administered Medications Ordered in Other Visits:   •  sodium chloride 0.9 % infusion 250 mL, 250 mL, Intravenous, PRN, Inocencia Felipe MD    Physical Exam:  HEENT: Pupils equal reactive conjunctiva pale no asymmetry  NECK:  No masses or bruit  CHEST: Clear  CARDIAC: Regular rhythm without gallop or rub  ABD: Spleen normal positive bowel sounds  :   NEURO:  Neuro intact  PSYCH:  No anxiety  EXTREM: Arthritic changes no edema  Skin: Clear     Results Review:    Patient provides lab work of February 8 with a hemoglobin of 8.8 and a white count of 1330 he hopes to have improved lab work this Friday so that he can resume chemotherapy for his AML  Procedures    Medication Review: Medications reviewed and noted    Patient wellness counseling  Exercise: Patient tries to be physically active  Diet: Tries to consume a 1700 dell per day of Ensure Plus is unable to eat solid food  Smoking: Nonsmoker  Alcohol:  Nonalcohol  Screening:    Assessment/Plan:  AML with anemia and leukopenia on chemotherapy being followed at   History of plasmacytoma left hip  History of multiple myeloma  History of brain mass 1990 resected  Is treated hypertension no therapy  History of mixed hyperlipidemia therapy discontinued  Diabetes mellitus type 2 on glimepiride and metformin currently stable    Continue therapy  See the patient back in 3-4 months  Follow-up with  heme  Patient Instructions   Continue current therapy  No lab obtained today from me  Follow-up with  hematology  See the patient back in 3-4 months or when necessary.       Plan of care reviewed with patient at the conclusion of today's visit. Education was provided regarding diagnosis, management, and any prescribed or recommended OTC medications.Patient verbalizes understanding of and agreement with management plan.         Naresh Gutiérrez MD

## 2019-02-13 NOTE — PATIENT INSTRUCTIONS
Continue current therapy  No lab obtained today from me  Follow-up with [facility] hematology  See the patient back in 3-4 months or when necessary.

## 2019-06-13 NOTE — PROGRESS NOTES
[facility]     Marcin Joyce  1940   [id number]      Patient Care Team:  Naresh Gutiérrez MD as PCP - General  Naresh Gutiérrez MD as PCP - Family Medicine  Naresh Turner MD (Inactive) as Consulting Physician (Hematology and Oncology)    Chief Complaint::   Chief Complaint   Patient presents with   • Acute myeloid leukemia     on chemo at present. No pain, but no energy.  No appetite - Drinking ensure 5 cans daily   • Hypertension   • Diabetes     states blood sugars averaging 152          HPI  He is a 78 yo male presenting with acute myeloid leukemia and diabetes follow-up. He is taking chemotherapy IV and orally. He goes for check ups every Monday and Thursday. No pain but feels fatigued. Denies headache or lightheadedness but does take his time when getting up. Denies chest pain or pressure. He can drive but doesn't much. He drinks three water bottles a day. He has been drinking Ensure. He takes Miralax every other day. He says at home his blood sugars are running in the 150s usually at home. He is taking his medications regularly.       Patient Active Problem List   Diagnosis   • CAD (coronary artery disease)   • Dyslipidemia   • Right hip pain   • Arthritis   • Cellulitis of left lower extremity   • Left carotid bruit   • Plasmacytoma (CMS/HCC)   • Brain cancer (CMS/HCC)   • Neck pain   • Anemia   • Multiple myeloma (CMS/HCC)   • Acute myeloid leukemia (CMS/HCC)   • Angina pectoris (CMS/HCC)   • Anorectal disorder   • Backache   • Benign essential hypertension   • Benign prostatic hyperplasia without urinary obstruction   • Bone marrow disorder   • Bronchitis   • Cellulitis and abscess of lower extremity   • Cervical arthritis   • Cough   • Diabetes mellitus, type 2 (CMS/HCC)   • Diverticulitis of colon   • History of plasmacytoma   • Hypercholesterolemia   • Impacted cerumen   • Malignant neoplastic disease (CMS/HCC)   • Mixed hyperlipidemia   • Osteoarthritis   • Underweight   • Gout         Past Medical History:   Diagnosis Date   • Anxiety    • Arthritis    • Benign prostatic hyperplasia    • CAD (coronary artery disease)    • Carotid bruit    • Cellulitis of left lower extremity    • Colon, diverticulosis 09/23/2013   • Coronary artery disease 2003   • Diabetes mellitus (CMS/HCC)    • Diverticulosis 2004   • Dyslipidemia    • Hyperlipidemia    • Hypertension    • Left carotid bruit    • Meningioma (CMS/HCC)    • Multiple myeloma (CMS/HCC)    • Myeloma (CMS/HCC)     SINGLE    • OA (osteoarthritis)    • 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 Burns   • OTHER SURGICAL HISTORY Left     Shoulder repair   • OTHER SURGICAL HISTORY      Meningioma excision.   • OTHER SURGICAL HISTORY  05/16/2013    STRESS ECHO- ABNORMAL    • OTHER SURGICAL HISTORY      ANGIOPLASTY STENTS LAD CIRC    • PERCUTANEOUS BALLOON VALVULOPLASTY  2003    PERCUTANEOUS TRANSLUMINAL BALLOON ANGIOPLASTY WITH INSERTION OF STENT INTO CORONARY ARTERY   • PILONIDAL CYST DRAINAGE     • PILONIDAL CYSTECTOMY     • ROTATOR CUFF REPAIR Left     ROTATOR CUFF TEAR   • US CAROTID UNILATERAL  02/16/2016       Family History   Problem Relation Age of Onset   • Stroke Mother    • Stroke Father    • No Known Problems Sister    • Hypertension Other    • Hyperlipidemia Other    • Stroke Other    • Melanoma Other        Social History     Socioeconomic History   • Marital status:      Spouse name: Not on file   • Number of children: Not on file   • Years of education: Not on file   • Highest education level: Not on file   Tobacco Use   • Smoking status: Never Smoker   • Smokeless  "tobacco: Never Used   Substance and Sexual Activity   • Alcohol use: No   • Drug use: No   • Sexual activity: Defer   Social History Narrative    Pinky Joyce, dtr and saskia Terryr with pt       Allergies   Allergen Reactions   • Diclofenac Other (See Comments)     UNKNOWN    • Levofloxacin Other (See Comments)     LEVAQUIN    • Metronidazole Other (See Comments)     FLAGYL    • Niacin Other (See Comments)     NIASPAN    • Penicillins Other (See Comments)     \"broke out in sweat\"   • Simvastatin Myalgia     Swelling and pain in shoulder   • Voltaren [Diclofenac Sodium]        Review of Systems   Constitutional: Positive for fatigue. Negative for chills and fever.   HENT: Negative for congestion, ear pain and sinus pressure.    Respiratory: Negative for cough, chest tightness, shortness of breath and wheezing.    Cardiovascular: Negative for chest pain and palpitations.   Gastrointestinal: Negative for abdominal pain, blood in stool and constipation.   Musculoskeletal: Negative for back pain.   Skin: Negative for color change.   Allergic/Immunologic: Negative for environmental allergies.   Neurological: Negative for dizziness, speech difficulty and headache.   Psychiatric/Behavioral: Negative for decreased concentration. The patient is not nervous/anxious.         Vital Signs  Vitals:    06/13/19 1425 06/13/19 1442 06/13/19 1443   BP: 112/54 105/60 105/60   BP Location: Left arm Left arm Right arm   Patient Position: Sitting Sitting Sitting   Cuff Size:  Adult Adult   Pulse: 84     Weight: 71.7 kg (158 lb)     Height: 182.9 cm (72\")     PainSc: 0-No pain     Body mass index is 21.43 kg/m².        Current Outpatient Medications:   •  allopurinol (ZYLOPRIM) 300 MG tablet, Take 1 tablet by mouth Daily., Disp: 30 tablet, Rfl: 11  •  azaCITIDine in sodium chloride 0.9 % 100 mL IVPB, Infuse 75 mg/m2 into a venous catheter 1 (One) Time., Disp: , Rfl:   •  glimepiride (AMARYL) 2 MG tablet, Take 4 mg by mouth Daily. 4 " mg in the am and 2 mg in the pm, Disp: , Rfl:   •  latanoprost (XALATAN) 0.005 % ophthalmic solution, Apply  to eye. qhs, Disp: , Rfl:   •  metFORMIN (GLUCOPHAGE) 500 MG tablet, Take 500 mg by mouth 2 (Two) Times a Day With Meals., Disp: , Rfl:   •  metoprolol succinate XL (TOPROL-XL) 25 MG 24 hr tablet, TAKE 1 TABLET BY MOUTH  DAILY, Disp: 90 tablet, Rfl: 2  •  nitroglycerin (NITROSTAT) 0.4 MG SL tablet, Place 1 tablet under the tongue every 5 (five) minutes as needed for chest pain., Disp: 25 tablet, Rfl: 6  •  Nutritional Supplements (ENSURE PLUS PO), Take 5 Units by mouth Daily. Drinking 5 cans daily, Disp: , Rfl:   •  ondansetron (ZOFRAN) 8 MG tablet, Take 1 tablet by mouth 3 (Three) Times a Day As Needed for Nausea or Vomiting., Disp: 30 tablet, Rfl: 5  •  Venetoclax 10 MG tablet chemo tablet, Take 10 mg by mouth Daily., Disp: , Rfl:   •  dronabinol (MARINOL) 10 MG capsule, Take 1 capsule by mouth 2 (Two) Times a Day Before Meals., Disp: 30 capsule, Rfl: 1  •  dronabinol (MARINOL) 5 MG capsule, Take 10 mg by mouth every night at bedtime., Disp: , Rfl: 1  •  sulfamethoxazole-trimethoprim (BACTRIM DS) 800-160 MG per tablet, 1 tab PO 3x/week on M-W-F, Disp: 12 tablet, Rfl: 5  No current facility-administered medications for this visit.     Facility-Administered Medications Ordered in Other Visits:   •  sodium chloride 0.9 % infusion 250 mL, 250 mL, Intravenous, PRN, Inocencia Felipe MD    Physical Exam   Constitutional: He is oriented to person, place, and time.   HENT:   Head: Normocephalic.   Nose: Nose normal.   Eyes: Conjunctivae are normal. Pupils are equal, round, and reactive to light.   Neck: Normal range of motion. Carotid bruit is not present. No thyroid mass and no thyromegaly present.   Cardiovascular: Normal rate, regular rhythm and normal heart sounds.   No murmur heard.  Pulmonary/Chest: Effort normal and breath sounds normal.   Abdominal: Soft. Bowel sounds are normal. There is no  splenomegaly or hepatomegaly. There is no tenderness.   Musculoskeletal: Normal range of motion. He exhibits no edema.     Vascular Status -  His right foot exhibits normal foot vasculature  and no edema. His left foot exhibits normal foot vasculature  and no edema.  Neurological: He is alert and oriented to person, place, and time.   Skin: Skin is warm and dry.   Psychiatric: He has a normal mood and affect. His behavior is normal.        ACE III MINI           Results Review:    Patient had an earlier visit this day at  with a lab of hemoglobin 8.2 g platelet count of 53,000 and white count of 980 with a blood sugar of 222 which was taken after the patient had consumed 2 servings of Ensure Plus  Procedures    Medication Review: Medications reviewed and noted    Patient wellness counseling  Exercise: Encouraged walking activity  Diet: Encouraged high-calorie protein fat carbohydrate as BMI is 21.4  Smoking: Non-smoker  Alcohol: No alcohol use  Screening:    Assessment/Plan:    Problem List Items Addressed This Visit        Cardiovascular and Mediastinum    Benign essential hypertension - Primary    Current Assessment & Plan     Current blood pressure is low at 105/60 left and right arm patient's hemoglobin is 8.2 g this day patient currently not on hypertensive therapy continue same and encourage plenty of hydration follow-up with  hematology.         Relevant Medications    metoprolol succinate XL (TOPROL-XL) 25 MG 24 hr tablet    Mixed hyperlipidemia    Current Assessment & Plan     Patient has a history of mixed hyperlipidemia with statin intolerance currently not on therapy and would not suggest therapy as he is in the midst of treatment for his AML.            Endocrine    Diabetes mellitus, type 2 (CMS/HCC)    Current Assessment & Plan     Patient is a history of diabetes type 2 currently on Amaryl 2 mg in the a.m. and 1 mg in the p.m. as well as metformin 500 twice daily continue current therapy note  nonfasting blood sugar this day after 2 bottles of Ensure + was 222 suggest no change in therapy.         Relevant Medications    glimepiride (AMARYL) 2 MG tablet    metFORMIN (GLUCOPHAGE) 500 MG tablet       Hematopoietic and Hemostatic    Acute myeloid leukemia (CMS/HCC)    Overview     History of AML currently being treated at  hematology with lab 2 days a week with both IV and p.o. therapy currently stable today's white count was 980, with hemoglobin of 8.2, and platelet count of 53,000,         Current Assessment & Plan     Followed and treated at  hematology         Relevant Medications    azaCITIDine in sodium chloride 0.9 % 100 mL IVPB    Venetoclax 10 MG tablet chemo tablet           Patient Instructions   Lab discussed from  earlier this day  Continue current medical therapy  Encouraged honey water for hydration and to try to increase caloric intake  Return visit in 3 months or as needed.       Plan of care reviewed with patient at the conclusion of today's visit. Education was provided regarding diagnosis, management, and any prescribed or recommended OTC medications.Patient verbalizes understanding of and agreement with management plan.         Naresh Gutiérrez MD      Note: Part of this note may be an electronic transcription/translation of spoken language to printed text using the Dragon Dictation system.

## 2019-06-13 NOTE — ASSESSMENT & PLAN NOTE
Current blood pressure is low at 105/60 left and right arm patient's hemoglobin is 8.2 g this day patient currently not on hypertensive therapy continue same and encourage plenty of hydration follow-up with [facility].

## 2019-06-13 NOTE — ASSESSMENT & PLAN NOTE
Patient is a history of diabetes type 2 currently on Amaryl 2 mg in the a.m. and 1 mg in the p.m. as well as metformin 500 twice daily continue current therapy note nonfasting blood sugar this day after 2 bottles of Ensure + was 222 suggest no change in therapy.

## 2019-06-13 NOTE — ASSESSMENT & PLAN NOTE
Patient has a history of mixed hyperlipidemia with statin intolerance currently not on therapy and would not suggest therapy as he is in the midst of treatment for his AML.

## 2019-06-13 NOTE — PATIENT INSTRUCTIONS
Lab discussed from [facility] earlier this day  Continue current medical therapy  Encouraged honey water for hydration and to try to increase caloric intake  Return visit in 3 months or as needed.

## 2019-07-01 NOTE — PROGRESS NOTES
"[facility]  Office Progress Note  Marcin Joyce  1940  [phone]      Visit Date: 7/1/2019      PCP: Naresh Gutiérrez MD  [address] [facility] [place] [id number]    IDENTIFICATION: A 79 y.o.  male from [facility]    Chief Complaint   Patient presents with   • Coronary Artery Disease       PROBLEM LIST:   1. CAD  a. April 2005, status post PCI and stent placement, cutting balloon 2.25 x 10 mm. to the ostium and ramus intermedius and PCI and stenting 2.5 x 18 mm. Cypher drug-eluting stent to 16 atmospheres in the first diagonal.   b. June 2013, 3.0 x 18 Xience Expedition RICO to the LAD, patent stents otherwise. EF greater than 60%.   2. Dyslipidemia:  a. December 2006, total cholesterol 123, HDL 42, LDL 63, triglycerides 77, glucose 134.  b. Lipid profile June 2005: Total cholesterol 147, triglycerides 152, HDL 39, LDL 78; was taking Lipitor approximately every other day.  3. 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 Status post radiation therapy.   4. Right hip pain/arthritis.   5. Myeloma 2017- [facility]  6. 2018 Acute Leukemia- [facility]   7. Left lower extremity cellulitis, followed per Dr. Watts, 2013.   8. 6/13/19 plt 53K  9. Surgical history:  a. Pilonidal cystectomy.   b. Left shoulder repair.   c. Meningioma excision.   d. Right arthroscopic knee surgery.   e. Lumbar disk surgery, Dr. Burns.  10.  Diverticulitis- med rx      Allergies  Allergies   Allergen Reactions   • Diclofenac Other (See Comments)     UNKNOWN    • Levofloxacin Other (See Comments)     LEVAQUIN    • Metronidazole Other (See Comments)     FLAGYL    • Niacin Other (See Comments)     NIASPAN    • Penicillins Other (See Comments)     \"broke out in sweat\"   • Simvastatin Myalgia     Swelling and pain in shoulder   • Voltaren [Diclofenac Sodium]        Current Medications    Current Outpatient Medications:   •  allopurinol (ZYLOPRIM) 300 MG tablet, Take 1 tablet by mouth " "Daily., Disp: 30 tablet, Rfl: 11  •  azaCITIDine in sodium chloride 0.9 % 100 mL IVPB, Infuse 75 mg/m2 into a venous catheter 1 (One) Time., Disp: , Rfl:   •  dronabinol (MARINOL) 10 MG capsule, Take 1 capsule by mouth 2 (Two) Times a Day Before Meals., Disp: 30 capsule, Rfl: 1  •  glimepiride (AMARYL) 2 MG tablet, Take 4 mg by mouth Daily. 4 mg in the am and 2 mg in the pm, Disp: , Rfl:   •  latanoprost (XALATAN) 0.005 % ophthalmic solution, Apply  to eye. qhs, Disp: , Rfl:   •  metFORMIN (GLUCOPHAGE) 500 MG tablet, Take 500 mg by mouth 2 (Two) Times a Day With Meals., Disp: , Rfl:   •  metoprolol succinate XL (TOPROL-XL) 25 MG 24 hr tablet, TAKE 1 TABLET BY MOUTH  DAILY, Disp: 90 tablet, Rfl: 2  •  nitroglycerin (NITROSTAT) 0.4 MG SL tablet, Place 1 tablet under the tongue every 5 (five) minutes as needed for chest pain., Disp: 25 tablet, Rfl: 6  •  NON FORMULARY, 2 tablets Daily. Azacitidine tablets for Chemo and patient takes 2 tablets daily in the evening, Disp: , Rfl:   •  Nutritional Supplements (ENSURE PLUS PO), Take 5 Units by mouth Daily. Drinking 5 cans daily, Disp: , Rfl:   •  ondansetron (ZOFRAN) 8 MG tablet, Take 1 tablet by mouth 3 (Three) Times a Day As Needed for Nausea or Vomiting., Disp: 30 tablet, Rfl: 5  No current facility-administered medications for this visit.     Facility-Administered Medications Ordered in Other Visits:   •  sodium chloride 0.9 % infusion 250 mL, 250 mL, Intravenous, PRN, Inocencia Felipe MD      History of Present Illness     PT dx w acute leukemia since last visit.  Intermittent chemotherapy per [facility].  No cardiac issues.  Anorexia w drinking 5 ensures.        OBJECTIVE:  Vitals:    07/01/19 1045   BP: 108/50   BP Location: Right arm   Patient Position: Sitting   Pulse: 70   SpO2: 97%   Weight: 70 kg (154 lb 6.4 oz)   Height: 185.4 cm (73\")     Physical Exam   Constitutional: He is oriented to person, place, and time. He appears well-developed and " well-nourished. No distress.   Neck: Neck supple. No JVD present. No tracheal deviation present.   Cardiovascular: Normal rate, regular rhythm, normal heart sounds and intact distal pulses.   No murmur heard.  Pulmonary/Chest: Effort normal. He has no wheezes. He has rales.   Abdominal: Soft. Bowel sounds are normal. There is no tenderness. There is no guarding.   Musculoskeletal: He exhibits no edema or tenderness.   Neurological: He is alert and oriented to person, place, and time.   Nursing note and vitals reviewed.      Diagnostic Data:  Procedures      ASSESSMENT:   Diagnosis Plan   1. Coronary artery disease involving native coronary artery of native heart without angina pectoris     2. Mixed hyperlipidemia     3. Essential hypertension         PLAN:  1. No new anginal equiv CP  2. Historically well controlled, on duel therapy with statin and zetia.  3. Htn Well controlled.  Re: Hypotension with anemia and dehydration      Pt counseled to call PCP for diverticulitis issues.    RTC in 1 year or sooner if needed.    Naresh Gutiérrez MD, thank you for referring Mr. Joyce for evaluation.  I have forwarded my electronically generated recommendations to you for review.  Please do not hesitate to call with any questions.     Asif Fontenot MD, [facility]

## 2019-08-16 NOTE — TELEPHONE ENCOUNTER
PT CALLED STATING HE IS BADLY CONSTIPATED AND IS GOING TO [facility] TO GET CLEANED OUT   WANTED OT KNOW IF YOU NEEDED TO SEND THEM ANYTHING I INFORMED HIM YOU DIDN'T BUT I WOULD SEND YOU A NOTE TO LET YOU KNOW

## 2019-08-16 NOTE — ED PROVIDER NOTES
Subjective   Pt is a 80 yo male presenting to ED with complaints of constipation. Pt explains last normal bowel movement was about 2 weeks ago and has only been passing small amounts but no stool passed in 3 days. He tried Miralax, enema and milk of mag at home without relief. He denies any abdominal pain but has had some nausea. He denies rectal pain or bleeding. Pt with significant hx for current leukemia with treatment through . He explains since starting the leukemia treatment about a year ago he's had recurrent problems with constipation. He takes no daily narcotics. Pt denies dizziness, confusion, fever, chills, CP, SOB, cough, congestion, V/D, urinary, rash or leg swelling. He denies recent antibiotics. Pt drinks 5 Ensures daily for nutrition. PMHx significant for CAD (s/p stents), HTN, HLD, Pelvic cancer, Myeloma 2017, Current Leukemia with chemo, Arthirits, Anxiety, and DM (non insulin).         History provided by:  Patient, relative and medical records      Review of Systems   Constitutional: Negative for chills and fever.   HENT: Negative for congestion, ear pain, sore throat and trouble swallowing.    Eyes: Negative for pain, redness and visual disturbance.   Respiratory: Negative for cough and shortness of breath.    Cardiovascular: Negative for chest pain and leg swelling.   Gastrointestinal: Positive for constipation and nausea. Negative for abdominal pain, blood in stool, diarrhea and vomiting.   Genitourinary: Negative for difficulty urinating, dysuria and flank pain.   Musculoskeletal: Negative for arthralgias, back pain and joint swelling.   Skin: Negative.  Negative for rash and wound.   Allergic/Immunologic: Negative.    Neurological: Negative for dizziness, syncope, weakness, numbness and headaches.   Psychiatric/Behavioral: Negative for confusion.   All other systems reviewed and are negative.      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; Status post radiation therapy   • Plasmacytoma (CMS/HCC)     CANCER    • Right hip pain    • Thrombocytopenia (CMS/HCC)        Allergies   Allergen Reactions   • Diclofenac Other (See Comments)     UNKNOWN    • Levofloxacin Other (See Comments)     LEVAQUIN    • Metronidazole Other (See Comments)     FLAGYL    • Niacin Other (See Comments)     NIASPAN    • Penicillins Other (See Comments)     \"broke out in sweat\"   • Simvastatin Myalgia     Swelling and pain in shoulder   • Voltaren [Diclofenac Sodium]        Past Surgical History:   Procedure Laterality Date   • BONE MARROW BIOPSY W/ ASPIRATION  10/2001   • COLONOSCOPY  2013   • CORONARY STENT PLACEMENT  2013    LAD STENT   • KNEE ARTHROSCOPY Right    • LUMBAR DISC SURGERY      Dr. Burns   • OTHER SURGICAL HISTORY Left     Shoulder repair   • OTHER SURGICAL HISTORY      Meningioma excision.   • OTHER SURGICAL HISTORY  05/16/2013    STRESS ECHO- ABNORMAL    • OTHER SURGICAL HISTORY      ANGIOPLASTY STENTS LAD CIRC    • PERCUTANEOUS BALLOON VALVULOPLASTY  2003    PERCUTANEOUS TRANSLUMINAL BALLOON ANGIOPLASTY WITH INSERTION OF STENT INTO CORONARY ARTERY   • PILONIDAL CYST DRAINAGE     • PILONIDAL CYSTECTOMY     • ROTATOR CUFF REPAIR Left     ROTATOR CUFF TEAR   • US CAROTID UNILATERAL  02/16/2016       Family History   Problem Relation Age of Onset   • Stroke Mother    • Stroke Father    • No Known Problems Sister    • Hypertension Other    • Hyperlipidemia Other  "   • Stroke Other    • Melanoma Other        Social History     Socioeconomic History   • Marital status:      Spouse name: Not on file   • Number of children: Not on file   • Years of education: Not on file   • Highest education level: Not on file   Tobacco Use   • Smoking status: Never Smoker   • Smokeless tobacco: Never Used   Substance and Sexual Activity   • Alcohol use: No   • Drug use: No   • Sexual activity: Defer   Social History Narrative    Pinky Joyce, dtr and Nichole Monge dtr with pt           Objective   Physical Exam   Constitutional: He is oriented to person, place, and time. He appears well-developed and well-nourished.   HENT:   Head: Atraumatic.   Nose: Nose normal.   Eyes: Conjunctivae, EOM and lids are normal. Pupils are equal, round, and reactive to light.   Neck: Normal range of motion. Neck supple.   Cardiovascular: Normal rate, regular rhythm and normal heart sounds.   Pulmonary/Chest: Effort normal and breath sounds normal.   Abdominal: Soft. Bowel sounds are normal. He exhibits no distension. There is generalized tenderness (mild). There is no rebound and no guarding.   Musculoskeletal: Normal range of motion. He exhibits no edema, tenderness or deformity.   Neurological: He is alert and oriented to person, place, and time. He has normal strength. No sensory deficit.   Skin: Skin is warm, dry and intact.   Psychiatric: He has a normal mood and affect. His behavior is normal.   Nursing note and vitals reviewed.      Procedures           ED Course  ED Course as of Aug 17 0014   Fri Aug 16, 2019   1727 I reviewed Mr. Joyce data from today as well as recent records.  He is profoundly neutropenic.  He was neutropenic when checked by his oncologist at  a week ago.  His history and CT are consistent with constipation.  There is a possibility of a pneumonia although I have looked at his CT and compared with one that he had done a couple of years ago and there is not a significant  change.  Will discuss with his oncologist at   [DT]   7483 Spoke with Dr. Lewis with [facility] at . Went over hx and ED workup. Labs in ED today baseline. He recommended Azithromycin for possible new infiltrate. Pt to f/u outpatient with Dr. Veronica. He agreed with the plan of giving the patient an enema. -RLT  [RT]      ED Course User Index  [DT] Semaj Rubalcava MD  [RT] Kristen Best PA      Re-examined patient several times in ED. Pt given enema without significant bowel movement. Pt will go home and continue home regimen of Miralax and enemas. Encouraged fluid intake. Will dc home on Azithromycin and he will f/u with  [facility]. He and family understand to return to ED if new or worse sx.     Reviewed old records.   Discussed patient with Dr. Rubalcava.     Recent Results (from the past 24 hour(s))   Comprehensive Metabolic Panel    Collection Time: 08/16/19  3:45 PM   Result Value Ref Range    Glucose 192 (H) 65 - 99 mg/dL    BUN 21 8 - 23 mg/dL    Creatinine 0.71 (L) 0.76 - 1.27 mg/dL    Sodium 135 (L) 136 - 145 mmol/L    Potassium 4.3 3.5 - 5.2 mmol/L    Chloride 98 98 - 107 mmol/L    CO2 27.0 22.0 - 29.0 mmol/L    Calcium 9.3 8.6 - 10.5 mg/dL    Total Protein 6.7 6.0 - 8.5 g/dL    Albumin 3.70 3.50 - 5.20 g/dL    ALT (SGPT) 13 1 - 41 U/L    AST (SGOT) 16 1 - 40 U/L    Alkaline Phosphatase 66 39 - 117 U/L    Total Bilirubin 0.7 0.2 - 1.2 mg/dL    eGFR Non African Amer 106 >60 mL/min/1.73    Globulin 3.0 gm/dL    A/G Ratio 1.2 g/dL    BUN/Creatinine Ratio 29.6 (H) 7.0 - 25.0    Anion Gap 10.0 5.0 - 15.0 mmol/L   Lipase    Collection Time: 08/16/19  3:45 PM   Result Value Ref Range    Lipase 4 (L) 13 - 60 U/L   Lactic Acid, Plasma    Collection Time: 08/16/19  3:45 PM   Result Value Ref Range    Lactate 1.2 0.5 - 2.0 mmol/L   CBC Auto Differential    Collection Time: 08/16/19  3:45 PM   Result Value Ref Range    WBC 0.76 (C) 3.40 - 10.80 10*3/mm3    RBC 2.54 (L) 4.14 - 5.80 10*6/mm3    Hemoglobin 7.6 (L) 13.0  - 17.7 g/dL    Hematocrit 23.3 (L) 37.5 - 51.0 %    MCV 91.7 79.0 - 97.0 fL    MCH 29.9 26.6 - 33.0 pg    MCHC 32.6 31.5 - 35.7 g/dL    RDW 16.8 (H) 12.3 - 15.4 %    RDW-SD 55.5 (H) 37.0 - 54.0 fl    MPV 11.7 6.0 - 12.0 fL    Platelets 39 (C) 140 - 450 10*3/mm3    Neutrophil % 11.9 (L) 42.7 - 76.0 %    Lymphocyte % 75.0 (H) 19.6 - 45.3 %    Monocyte % 10.5 5.0 - 12.0 %    Eosinophil % 0.0 (L) 0.3 - 6.2 %    Basophil % 0.0 0.0 - 1.5 %    Immature Grans % 2.6 (H) 0.0 - 0.5 %    Neutrophils, Absolute 0.09 (L) 1.70 - 7.00 10*3/mm3    Lymphocytes, Absolute 0.57 (L) 0.70 - 3.10 10*3/mm3    Monocytes, Absolute 0.08 (L) 0.10 - 0.90 10*3/mm3    Eosinophils, Absolute 0.00 0.00 - 0.40 10*3/mm3    Basophils, Absolute 0.00 0.00 - 0.20 10*3/mm3    Immature Grans, Absolute 0.02 0.00 - 0.05 10*3/mm3    nRBC 0.0 0.0 - 0.2 /100 WBC   Light Blue Top    Collection Time: 08/16/19  3:45 PM   Result Value Ref Range    Extra Tube hold for add-on    Green Top (Gel)    Collection Time: 08/16/19  3:45 PM   Result Value Ref Range    Extra Tube Hold for add-ons.    Lavender Top    Collection Time: 08/16/19  3:45 PM   Result Value Ref Range    Extra Tube hold for add-on    Gold Top - SST    Collection Time: 08/16/19  3:45 PM   Result Value Ref Range    Extra Tube Hold for add-ons.    Scan Slide    Collection Time: 08/16/19  3:45 PM   Result Value Ref Range    RBC Morphology Normal Normal    WBC Morphology Normal Normal    Platelet Estimate Decreased Normal   Urinalysis With Microscopic If Indicated (No Culture) - Urine, Clean Catch    Collection Time: 08/16/19  4:38 PM   Result Value Ref Range    Color, UA Yellow Yellow, Straw    Appearance, UA Cloudy (A) Clear    pH, UA 6.5 5.0 - 8.0    Specific Gravity, UA 1.024 1.001 - 1.030    Glucose, UA Negative Negative    Ketones, UA Negative Negative    Bilirubin, UA Negative Negative    Blood, UA Negative Negative    Protein,  mg/dL (2+) (A) Negative    Leuk Esterase, UA Negative Negative     "Nitrite, UA Negative Negative    Urobilinogen, UA 0.2 E.U./dL 0.2 - 1.0 E.U./dL   Urinalysis, Microscopic Only - Urine, Clean Catch    Collection Time: 08/16/19  4:38 PM   Result Value Ref Range    RBC, UA 3-6 (A) None Seen, 0-2 /HPF    WBC, UA 0-2 None Seen, 0-2 /HPF    Bacteria, UA None Seen None Seen, Trace /HPF    Squamous Epithelial Cells, UA 0-2 None Seen, 0-2 /HPF    Hyaline Casts, UA 0-6 0 - 6 /LPF    Methodology Automated Microscopy      Note: In addition to lab results from this visit, the labs listed above may include labs taken at another facility or during a different encounter within the last 24 hours. Please correlate lab times with ED admission and discharge times for further clarification of the services performed during this visit.    CT Abdomen Pelvis Without Contrast   Final Result   Mild increased markings and fibronodular densities at the   right lung base concerning for possible early infiltrate. Stable lymph   node seen within the mesenteric root. There is moderate stool within the   colon with no significant fecal impaction. Stable expansile lesion   identified within the left posterior acetabulum.       DICTATED:   08/16/2019   EDITED/ls :   08/16/2019        This report was finalized on 8/16/2019 6:49 PM by Dr. Carli De La Cruz MD.            Vitals:    08/16/19 1429 08/16/19 1550 08/16/19 1910   BP: 133/74 127/75 131/73   BP Location: Left arm     Patient Position: Sitting     Pulse: 90     Resp: 16     Temp: 97.3 °F (36.3 °C)     TempSrc: Oral     SpO2: 98% 99%    Weight: 69.9 kg (154 lb)     Height: 185.4 cm (73\")       Medications   sodium chloride 0.9 % bolus 1,000 mL (0 mL Intravenous Stopped 8/16/19 1700)   heparin flush (porcine) 100 UNIT/ML injection 300 Units (300 Units Intravenous Given 8/16/19 2134)     ECG/EMG Results (last 24 hours)     ** No results found for the last 24 hours. **        No orders to display                   MDM      Final diagnoses:   Constipation, " unspecified constipation type   History of leukemia   Chemotherapy-induced neutropenia (CMS/HCC)   Lung infiltrate            Kristen Best PA  08/17/19 0014

## 2019-08-16 NOTE — TELEPHONE ENCOUNTER
PT'S DAUGHTER CALLED STATING SHE NEEDS TO SPEAK TO [facility]  ABOUT SOME THINGS GOING ON WITH HER FATHER WOULD'NT GIVE ANY DETAILS

## 2019-08-19 NOTE — TELEPHONE ENCOUNTER
AILYN CALLED STATING PT IS THERE AND TOLD HER HE WENT TO ER ON 8/16/19 FOR CONSTIPATION HE STATES THEY REALLY DIDN'T DO ANYTHING FOR HIM AND SHE WANTS TO KNOW IF HE CAN  TO BE SEEN BY SOMEONE HERE TODAY   TALKED TO RANJIT AND PT'S DAUGHTER HAD CALLED EARLIER AND DR. HARRY SENT HIM TO ER

## 2019-08-19 NOTE — TELEPHONE ENCOUNTER
Patients daughter called stating that he went to ED on Friday for constipation. They gave him an ennema and it did not help.   Over the weekend he has not been eating unable to walk, and throwing up. She says he feels like he is impacted. They are currently at  doing labs.  Per JTH he needs to go to ER at    I informed La and she verbalized understanding.

## 2019-10-01 NOTE — PROGRESS NOTES
[facility]     Marcin Joyce  1940   [id number]      Patient Care Team:  Naresh Gutiérrez MD as PCP - General  Naresh Gutiérrez MD as PCP - Family Medicine  Naresh Turner MD (Inactive) as Consulting Physician (Hematology and Oncology)    Chief Complaint::   Chief Complaint   Patient presents with   • Multiple Myeloma     returned   • Leukemia     currently receiving chemotherapy   • Anorexia     drinks 5 Ensure Plus daily            HPI  Patient is 79-year-old male with a history of plasmacytoma left hip, multiple myeloma, and new recent diagnosis of AML being treated at [facility] hematology oncology at the [facility] on chemotherapy originally for the AML recent bone marrow studies show recrudescence of the multiple myeloma as well as AML.  Patient continues chemotherapy complains of general weakness anorexia continued weight loss despite oral supplementation of Ensure +5 to 6 cans/day he is a type II diabetic and is on glimepiride total of 6 mg/day have suggested adjusting dose to 4 mg in the a.m. and 2 mg in the p.m. to coincide with p.o. intake of nutrition.  In addition the patient has a history of coronary disease currently stable with remote stenting 2005 and 2015.      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)   • Anorectal disorder   • Backache   • Benign essential hypertension   • Benign prostatic hyperplasia without urinary obstruction   • Bone marrow disorder   • Bronchitis   • Cellulitis and abscess of lower extremity   • Cervical arthritis   • Cough   • Diabetes mellitus, type 2 (CMS/HCC)   • Diverticulitis of colon   • History of plasmacytoma   •  Hypercholesterolemia   • Impacted cerumen   • Malignant neoplastic disease (CMS/HCC)   • Mixed hyperlipidemia   • Osteoarthritis   • Underweight   • Gout   • Thrombocytopenia, unspecified (CMS/HCC)   • Cachexia (CMS/HCC)        Past Medical History:   Diagnosis Date   • Angina pectoris (CMS/HCC) 2/11/2019   • Anxiety    • Arthritis    • Benign prostatic hyperplasia    • CAD (coronary artery disease)    • Carotid bruit    • Cellulitis of left lower extremity    • Colon, diverticulosis 09/23/2013   • Coronary artery disease 2003   • Diabetes mellitus (CMS/HCC)    • Diverticulosis 2004   • Dyslipidemia    • Hyperlipidemia    • Hypertension    • Left carotid bruit    • Leukemia (CMS/HCC)    • Meningioma (CMS/HCC)    • Multiple myeloma (CMS/HCC)    • Myeloma (CMS/HCC)     SINGLE    • OA (osteoarthritis)    • 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 Burns   • OTHER SURGICAL HISTORY Left     Shoulder repair   • OTHER SURGICAL HISTORY      Meningioma excision.   • OTHER SURGICAL HISTORY  05/16/2013    STRESS ECHO- ABNORMAL    • OTHER SURGICAL HISTORY      ANGIOPLASTY STENTS LAD CIRC    • PERCUTANEOUS BALLOON VALVULOPLASTY  2003    PERCUTANEOUS TRANSLUMINAL BALLOON ANGIOPLASTY WITH INSERTION OF STENT INTO CORONARY ARTERY   • PILONIDAL CYST DRAINAGE     • PILONIDAL CYSTECTOMY     • ROTATOR CUFF REPAIR Left     ROTATOR CUFF TEAR   • US CAROTID UNILATERAL  02/16/2016       Family History   Problem Relation Age of Onset   • Stroke Mother    • Stroke Father    • No Known Problems Sister    • Hypertension Other    • Hyperlipidemia Other    • Stroke Other    • Melanoma  "Other        Social History     Socioeconomic History   • Marital status:      Spouse name: Not on file   • Number of children: Not on file   • Years of education: Not on file   • Highest education level: Not on file   Tobacco Use   • Smoking status: Never Smoker   • Smokeless tobacco: Never Used   Substance and Sexual Activity   • Alcohol use: No   • Drug use: No   • Sexual activity: Defer   Social History Narrative    Pinky Joyce, dtr and Nichole Monge dtr with pt       Allergies   Allergen Reactions   • Diclofenac Other (See Comments)     UNKNOWN    • Levofloxacin Other (See Comments)     LEVAQUIN    • Metronidazole Other (See Comments)     FLAGYL    • Niacin Other (See Comments)     NIASPAN    • Penicillins Other (See Comments)     \"broke out in sweat\"   • Simvastatin Myalgia     Swelling and pain in shoulder   • Voltaren [Diclofenac Sodium]        Review of Systems     HEENT: Denies headache dizzy eye trouble  NECK: Denies dysphagia or stiffness  CHEST: Non-smoker denies cough or wheeze  CARDIAC: Denies chest pain or pressure has very mild blood pressure currently controlled on metoprolol  ABD: Patient is anorexic no abdominal pain  : Denies dysuria frequency  NEURO: Complains of generalized weakness  PSYCH: Minimal stress depression  EXTREM: Remedies generally weak no edema    Vital Signs  Vitals:    10/01/19 1617   BP: 122/60   BP Location: Left arm   Patient Position: Sitting   Cuff Size: Adult   Pulse: 92   Weight: 68 kg (150 lb)   Height: 185.4 cm (73\")   PainSc: 0-No pain     Body mass index is 19.79 kg/m².    Current Outpatient Medications:   •  ACYCLOVIR PO, Take  by mouth 2 (Two) Times a Day., Disp: , Rfl:   •  fluconazole (DIFLUCAN) 200 MG tablet, Take 400 mg by mouth Daily., Disp: , Rfl:   •  glimepiride (AMARYL) 2 MG tablet, TAKE 2 TABLETS BY MOUTH IN  THE MORNING AND 1 TABLET BY MOUTH EVERY EVENING, Disp: 270 tablet, Rfl: 3  •  latanoprost (XALATAN) 0.005 % ophthalmic solution, Apply  to " eye. qhs, Disp: , Rfl:   •  metoprolol succinate XL (TOPROL-XL) 25 MG 24 hr tablet, TAKE 1 TABLET BY MOUTH  DAILY, Disp: 90 tablet, Rfl: 2  •  nitroglycerin (NITROSTAT) 0.4 MG SL tablet, Place 1 tablet under the tongue every 5 (five) minutes as needed for chest pain., Disp: 25 tablet, Rfl: 6  •  NON FORMULARY, 2 tablets Daily. Azacitidine tablets for Chemo and patient takes 2 tablets daily in the evening, Disp: , Rfl:   •  Nutritional Supplements (ENSURE PLUS PO), Take 5 Units by mouth Daily. Drinking 5 cans daily, Disp: , Rfl:   •  ondansetron (ZOFRAN) 8 MG tablet, Take 1 tablet by mouth 3 (Three) Times a Day As Needed for Nausea or Vomiting., Disp: 30 tablet, Rfl: 5  •  azaCITIDine in sodium chloride 0.9 % 100 mL IVPB, Infuse 75 mg/m2 into a venous catheter 1 (One) Time., Disp: , Rfl:   No current facility-administered medications for this visit.     Facility-Administered Medications Ordered in Other Visits:   •  sodium chloride 0.9 % infusion 250 mL, 250 mL, Intravenous, PRN, Inocencia Felipe MD    Physical Exam     ACE III MINI         HEENT: Pupils equal reactive ENT clear no asymmetry  NECK: No masses bruits thyromegaly  CHEST: Distant but clear without rales wheezes or rhonchi  CARDIAC: Regular rhythm without gallop or rub  ABD: Liver and spleen are normal no bruit positive bowel sounds  :   NEURO: CNS is intact  PSYCH: Minimal anxiety depression  EXTREM: Minimal arthritic changes no edema  Skin: Clear     Results Review:    No results found for this or any previous visit (from the past 672 hour(s)).  Procedures    Medication Review: Medications reviewed and noted    Patient wellness counseling  Exercise: Encouraged walking exercise  Diet: Encouraged to increase nutritional caloric intake  Smoking: Non-smoker  Alcohol: None alcohol  Screening: Lab done through port [facility]    Assessment/Plan:    Problem List Items Addressed This Visit        Cardiovascular and Mediastinum    Benign  essential hypertension - Primary    Current Assessment & Plan     History of essential hypertension with current blood pressure 122/60 left and right arm on metoprolol succinate 25 mg daily suggest no change in therapy.         Relevant Medications    metoprolol succinate XL (TOPROL-XL) 25 MG 24 hr tablet    Mixed hyperlipidemia    Current Assessment & Plan     History of mixed hyperlipidemia not on therapy secondary to statin intolerance, and chemotherapy for his AML and multiple myeloma.            Endocrine    Diabetes mellitus, type 2 (CMS/HCC)    Current Assessment & Plan     History of diabetes type 2 on oral agents of glimepiride 2 mg tablets currently taking at 3 tablets in the p.m. suggest patient change the timing of his medications to a total of 4 mg in the a.m. and 2 mg in the p.m.         Relevant Medications    glimepiride (AMARYL) 2 MG tablet       Hematopoietic and Hemostatic    Plasmacytoma (CMS/HCC)    Overview              Current Assessment & Plan     History of plasmacytoma involving left pelvis diagnosed in 2001 with radiation currently stable.         Relevant Medications    acetaminophen (TYLENOL) tablet 650 mg (Completed)    diphenhydrAMINE (BENADRYL) capsule 25 mg (Completed)    acetaminophen (TYLENOL) tablet 650 mg (Completed)    diphenhydrAMINE (BENADRYL) capsule 25 mg (Completed)    furosemide (LASIX) injection 20 mg (Completed)    azaCITIDine in sodium chloride 0.9 % 100 mL IVPB    Multiple myeloma (CMS/HCC)    Overview     Recent bone marrow shows evidence of recurrent multiple myeloma         Relevant Medications    ondansetron (ZOFRAN) 8 MG tablet    Influenza Vaccine Quad suspension prefilled syringe 0.5 mL (Completed)    bortezomib (VELCADE) chemo injection 2.7 mg (Completed)    bortezomib (VELCADE) chemo injection 2.7 mg (Completed)    zoledronic acid (ZOMETA) 4 mg in sodium chloride 0.9 % 100 mL IVPB (Completed)    bortezomib (VELCADE) chemo injection 2.7 mg (Completed)     bortezomib (VELCADE) chemo injection 2.7 mg (Completed)    acetaminophen (TYLENOL) tablet 325 mg (Completed)    diphenhydrAMINE (BENADRYL) capsule 25 mg (Completed)    bortezomib (VELCADE) chemo injection 2.7 mg (Completed)    zoledronic acid (ZOMETA) 4 mg in sodium chloride 0.9 % 100 mL IVPB (Completed)    bortezomib (VELCADE) chemo injection 2.7 mg (Completed)    bortezomib (VELCADE) chemo injection 2.7 mg (Completed)    bortezomib (VELCADE) chemo injection 2.7 mg (Completed)    epoetin kelle (EPOGEN,PROCRIT) injection 40,000 Units (Completed)    sodium chloride 0.9 % infusion 250 mL    zoledronic acid (ZOMETA) 4 mg in sodium chloride 0.9 % 100 mL IVPB (Completed)    bortezomib (VELCADE) chemo injection 2.7 mg (Completed)    epoetin kelle (EPOGEN,PROCRIT) injection 40,000 Units (Completed)    bortezomib (VELCADE) chemo injection 2.7 mg (Completed)    epoetin kelle (EPOGEN,PROCRIT) injection 40,000 Units (Completed)    zoledronic acid (ZOMETA) 4 mg in sodium chloride 0.9 % 100 mL IVPB (Completed)    epoetin kelle (EPOGEN,PROCRIT) injection 40,000 Units (Completed)    epoetin kelle (EPOGEN,PROCRIT) injection 40,000 Units (Completed)    zoledronic acid (ZOMETA) 4 mg in sodium chloride 0.9 % 100 mL IVPB (Completed)    zoledronic acid (ZOMETA) 4 mg in sodium chloride 0.9 % 100 mL IVPB (Completed)    zoledronic acid (ZOMETA) 4 mg in sodium chloride 0.9 % 100 mL IVPB (Completed)    zoledronic acid (ZOMETA) 4 mg in sodium chloride 0.9 % 100 mL IVPB (Completed)    sodium chloride 0.9 % infusion 250 mL (Completed)    zoledronic acid (ZOMETA) 4 mg in sodium chloride 0.9 % 100 mL IVPB (Completed)    zoledronic acid (ZOMETA) 4 mg in sodium chloride 0.9 % 100 mL IVPB (Completed)    sodium chloride 0.9 % infusion 500 mL (Completed)    sodium chloride 0.9 % infusion 250 mL (Completed)    zoledronic acid (ZOMETA) 4 mg in sodium chloride 0.9 % 100 mL IVPB (Completed)    azaCITIDine in sodium chloride 0.9 % 100 mL IVPB    Acute myeloid  leukemia not having achieved remission (CMS/HCC)    Overview     History of AML currently being treated at  hematology with lab 2 days a week with both IV and p.o. therapy currently stable today's white count was 980, with hemoglobin of 8.2, and platelet count of 53,000,         Current Assessment & Plan     Currently on chemotherapy at  with recent bone marrow biopsy showing relapse of multiple myeloma as well as the AML patient is on chemo.         Relevant Medications    azaCITIDine in sodium chloride 0.9 % 100 mL IVPB    Thrombocytopenia, unspecified (CMS/HCC)    Overview     History of multiple myeloma, and AML, with recurrence of the multiple myeloma on chemotherapy secondary leukopenia anemia and thrombocytopenia being followed by  hematology oncology.on AZACITIDINE and VENETOCLAX                Patient Instructions   Encouraged increased nutritional calories  Continue follow-up with  hematology oncology at [facility]  Return visit in 2 months  No change medical therapy except adjust the glimepiride to 4 mg in a.m. and 2 mg in the p.m.       Plan of care reviewed with patient at the conclusion of today's visit. Education was provided regarding diagnosis, management, and any prescribed or recommended OTC medications.Patient verbalizes understanding of and agreement with management plan.         Naresh Gutiérrez MD      Note: Part of this note may be an electronic transcription/translation of spoken language to printed text using the Dragon Dictation system.

## 2019-10-02 PROBLEM — R64 CACHEXIA (HCC): Status: ACTIVE | Noted: 2019-01-01

## 2019-10-02 PROBLEM — I20.9 ANGINA PECTORIS (HCC): Status: RESOLVED | Noted: 2019-02-11 | Resolved: 2019-01-01

## 2019-10-02 PROBLEM — D69.6 THROMBOCYTOPENIA, UNSPECIFIED (HCC): Status: ACTIVE | Noted: 2019-01-01

## 2019-10-02 NOTE — PATIENT INSTRUCTIONS
Encouraged increased nutritional calories  Continue follow-up with  hematology oncology at [facility]  Return visit in 2 months  No change medical therapy except adjust the glimepiride to 4 mg in a.m. and 2 mg in the p.m.

## 2019-10-02 NOTE — ASSESSMENT & PLAN NOTE
History of diabetes type 2 on oral agents of glimepiride 2 mg tablets currently taking at 3 tablets in the p.m. suggest patient change the timing of his medications to a total of 4 mg in the a.m. and 2 mg in the p.m.

## 2019-10-02 NOTE — ASSESSMENT & PLAN NOTE
History of essential hypertension with current blood pressure 122/60 left and right arm on metoprolol succinate 25 mg daily suggest no change in therapy.

## 2019-10-02 NOTE — ASSESSMENT & PLAN NOTE
History of mixed hyperlipidemia not on therapy secondary to statin intolerance, and chemotherapy for his AML and multiple myeloma.

## 2019-10-02 NOTE — ASSESSMENT & PLAN NOTE
Patient has a history of coronary disease with stenting times 2 April 2005 and stenting of the LAD June 2013 currently without chest pain pressure tightness or palpitations with no anginal symptoms.

## 2020-01-01 ENCOUNTER — OFFICE VISIT (OUTPATIENT)
Dept: INTERNAL MEDICINE | Facility: CLINIC | Age: 80
End: 2020-01-01

## 2020-01-01 ENCOUNTER — TRANSITIONAL CARE MANAGEMENT TELEPHONE ENCOUNTER (OUTPATIENT)
Dept: INTERNAL MEDICINE | Facility: CLINIC | Age: 80
End: 2020-01-01

## 2020-01-01 VITALS
HEART RATE: 80 BPM | WEIGHT: 153 LBS | BODY MASS INDEX: 20.28 KG/M2 | HEIGHT: 73 IN | SYSTOLIC BLOOD PRESSURE: 136 MMHG | DIASTOLIC BLOOD PRESSURE: 60 MMHG

## 2020-01-01 DIAGNOSIS — C92.00 ACUTE MYELOID LEUKEMIA NOT HAVING ACHIEVED REMISSION (HCC): ICD-10-CM

## 2020-01-01 DIAGNOSIS — I10 BENIGN ESSENTIAL HYPERTENSION: Primary | ICD-10-CM

## 2020-01-01 DIAGNOSIS — E11.9 TYPE 2 DIABETES MELLITUS WITHOUT COMPLICATION, WITHOUT LONG-TERM CURRENT USE OF INSULIN (HCC): ICD-10-CM

## 2020-01-01 DIAGNOSIS — I25.10 CORONARY ARTERY DISEASE INVOLVING NATIVE CORONARY ARTERY OF NATIVE HEART WITHOUT ANGINA PECTORIS: ICD-10-CM

## 2020-01-01 DIAGNOSIS — L08.9 SKIN INFECTION: ICD-10-CM

## 2020-01-01 PROCEDURE — 99214 OFFICE O/P EST MOD 30 MIN: CPT | Performed by: INTERNAL MEDICINE

## 2020-01-01 RX ORDER — GLIMEPIRIDE 2 MG/1
TABLET ORAL
Qty: 270 TABLET | Refills: 3
Start: 2020-01-01

## 2020-01-01 RX ORDER — INCONTINENCE PAD,LINER,DISP
2 EACH MISCELLANEOUS 2 TIMES DAILY
COMMUNITY
Start: 2019-01-01

## 2020-01-07 PROBLEM — L08.9 SKIN INFECTION: Status: ACTIVE | Noted: 2020-01-01

## 2020-01-07 NOTE — PROGRESS NOTES
[facility]     Marcin Joyce  1940   [id number]      Patient Care Team:  Naresh Gutiérrez MD as PCP - General  Naresh Gutiérrez MD as PCP - Family Medicine  Naresh Turner MD (Inactive) as Consulting Physician (Hematology and Oncology)    Chief Complaint::   Chief Complaint   Patient presents with   • Hypertension     follow up   • Leukemia     patient continues to receive platelets and blood prn.  Has had a total of 67 units             HPI  Patient 79-year-old male with a history of coronary disease and coronary stenting essential hypertension mixed hyperlipidemia diabetes type 2 with development of AML currently on chemotherapy with anemia leukopenia and thrombocytopenia having received over 67 units of packed cells and platelets on metoprolol succinate for blood pressure and on glimepiride for his diabetes with some blood sugars running in the low 100s on current therapy he is consuming 1870 dell/day which includes orange juice for breakfast and 5 cans of Ensure as his only intake with a total caloric intake of 1870 dell.  Denies headache or dizzy denies cough or wheeze denies chest pain complains of profound weakness.      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)   • Anorectal disorder   • Backache   • Benign essential hypertension   • Benign prostatic hyperplasia without urinary obstruction   • Bone marrow disorder   • Bronchitis   • Cellulitis and abscess of lower extremity   • Cervical arthritis   • Cough   • Diabetes mellitus, type 2 (CMS/HCC)   • Diverticulitis of colon   • History of plasmacytoma   • Hypercholesterolemia   • Impacted cerumen   • Malignant neoplastic disease  (CMS/HCC)   • Mixed hyperlipidemia   • Osteoarthritis   • Underweight   • Gout   • Thrombocytopenia, unspecified (CMS/HCC)   • Cachexia (CMS/HCC)   • Skin infection        Past Medical History:   Diagnosis Date   • Angina pectoris (CMS/HCC) 2/11/2019   • Anxiety    • Arthritis    • Benign prostatic hyperplasia    • CAD (coronary artery disease)    • Carotid bruit    • Cellulitis of left lower extremity    • Colon, diverticulosis 09/23/2013   • Coronary artery disease 2003   • Diabetes mellitus (CMS/HCC)    • Diverticulosis 2004   • Dyslipidemia    • Hyperlipidemia    • Hypertension    • Left carotid bruit    • Leukemia (CMS/HCC)    • Meningioma (CMS/HCC)    • Multiple myeloma (CMS/HCC)    • Myeloma (CMS/HCC)     SINGLE    • OA (osteoarthritis)    • 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 Burns   • OTHER SURGICAL HISTORY Left     Shoulder repair   • OTHER SURGICAL HISTORY      Meningioma excision.   • OTHER SURGICAL HISTORY  05/16/2013    STRESS ECHO- ABNORMAL    • OTHER SURGICAL HISTORY      ANGIOPLASTY STENTS LAD CIRC    • PERCUTANEOUS BALLOON VALVULOPLASTY  2003    PERCUTANEOUS TRANSLUMINAL BALLOON ANGIOPLASTY WITH INSERTION OF STENT INTO CORONARY ARTERY   • PILONIDAL CYST DRAINAGE     • PILONIDAL CYSTECTOMY     • ROTATOR CUFF REPAIR Left     ROTATOR CUFF TEAR   • US CAROTID UNILATERAL  02/16/2016       Family History   Problem Relation Age of Onset   • Stroke Mother    • Stroke Father    • No Known Problems Sister    • Hypertension Other    • Hyperlipidemia Other    • Stroke Other    • Melanoma Other        Social History     Socioeconomic History   •  "Marital status:      Spouse name: Not on file   • Number of children: Not on file   • Years of education: Not on file   • Highest education level: Not on file   Tobacco Use   • Smoking status: Never Smoker   • Smokeless tobacco: Never Used   Substance and Sexual Activity   • Alcohol use: No   • Drug use: No   • Sexual activity: Defer   Social History Narrative    Pinky Joyce dtr and Nichole Monge dtr with pt       Allergies   Allergen Reactions   • Diclofenac Other (See Comments)     UNKNOWN    • Levofloxacin Other (See Comments)     LEVAQUIN    • Metronidazole Other (See Comments)     FLAGYL    • Niacin Other (See Comments)     NIASPAN    • Penicillins Other (See Comments)     \"broke out in sweat\"   • Simvastatin Myalgia     Swelling and pain in shoulder   • Voltaren [Diclofenac Sodium] Unknown - Low Severity       Review of Systems     HEENT: Denies headache vision or hearing change  NECK: Denies dysphasia  CHEST: Denies cough or wheeze  CARDIAC: Denies chest pain or pressure history of hypertension history of cardiovascular stenting  ABD: Denies nausea vomiting  : Denies dysuria  NEURO: Complains of mild neuropathy secondary to the chemo  PSYCH: Complains of mild stress depression  EXTREM: Planes of generalized weakness and mild arthritic soreness    Vital Signs  Vitals:    01/07/20 1145   BP: 136/60   BP Location: Left arm   Patient Position: Sitting   Cuff Size: Adult   Pulse: 80   Weight: 69.4 kg (153 lb)   Height: 185.4 cm (73\")   PainSc: 0-No pain     Body mass index is 20.19 kg/m².    Current Outpatient Medications:   •  ACYCLOVIR PO, Take  by mouth 2 (Two) Times a Day., Disp: , Rfl:   •  azaCITIDine in sodium chloride 0.9 % 100 mL IVPB, Infuse 75 mg/m2 into a venous catheter 1 (One) Time., Disp: , Rfl:   •  fluconazole (DIFLUCAN) 200 MG tablet, Take 400 mg by mouth Daily., Disp: , Rfl:   •  glimepiride (AMARYL) 2 MG tablet, Take 1 tablet twice daily, Disp: 270 tablet, Rfl: 3  •  latanoprost " (XALATAN) 0.005 % ophthalmic solution, Apply  to eye. qhs, Disp: , Rfl:   •  metoprolol succinate XL (TOPROL-XL) 25 MG 24 hr tablet, TAKE 1 TABLET BY MOUTH  DAILY, Disp: 90 tablet, Rfl: 2  •  nitroglycerin (NITROSTAT) 0.4 MG SL tablet, Place 1 tablet under the tongue every 5 (five) minutes as needed for chest pain., Disp: 25 tablet, Rfl: 6  •  NON FORMULARY, 2 tablets Daily. Azacitidine tablets for Chemo and patient takes 2 tablets daily in the evening, Disp: , Rfl:   •  Nutritional Supplements (ENSURE PLUS PO), Take 5 Units by mouth Daily. Drinking 5 cans daily, Disp: , Rfl:   •  ondansetron (ZOFRAN) 8 MG tablet, Take 1 tablet by mouth 3 (Three) Times a Day As Needed for Nausea or Vomiting., Disp: 30 tablet, Rfl: 5  •  Venetoclax 100 MG tablet, Take 100 mg by mouth Daily., Disp: , Rfl:   •  [employer] SENNA PLUS 8.6-50 MG per tablet, Take 2 tablets by mouth 2 (Two) Times a Day., Disp: , Rfl:   •  mupirocin (BACTROBAN) 2 % ointment, Apply  topically to the appropriate area as directed 3 (Three) Times a Day., Disp: 15 g, Rfl: 3  No current facility-administered medications for this visit.     Facility-Administered Medications Ordered in Other Visits:   •  sodium chloride 0.9 % infusion 250 mL, 250 mL, Intravenous, PRN, Inocencia Felipe MD    Physical Exam     ACE III MINI         HEENT: Pupils equal reactive ENT clear conjunctiva pale no asymmetry  NECK: No masses bruit or thyromegaly  CHEST: Port right chest clear breath sounds  CARDIAC: Regular rhythm without gallop or rub  ABD: Liver and spleen normal good bowel sounds  :   NEURO: Distal peripheral neuropathy  PSYCH: Mild stress depression  EXTREM: Mild arthritic changes generally weak with atrophy muscle loss  Skin: Haley-port erythema right anterior chest and small rectal early pressure fissure     Results Review:    No results found for this or any previous visit (from the past 672 hour(s)).  Procedures    Medication Review: Medications reviewed and  noted    Patient wellness counseling  Exercise: Encouraged ambulation walking activity  Diet: Encouraged continued diet   Smoking: Non-smoker  Alcohol: None alcohol  Screening: Listing of blood sugars reviewed    Assessment/Plan:    Problem List Items Addressed This Visit        Cardiovascular and Mediastinum    CAD (coronary artery disease)    Overview     1. Coronary artery disease:  a. April 2005, status post PCI and stent placement, cutting balloon 2.25 x 10 mm. to the ostium and ramus intermedius and PCI and stenting 2.5 x 18 mm. Cypher drug-eluting stent to 16 atmospheres in the first diagonal.    b. June 2013, 3.0 x 18 Xience [employer] to the LAD, patent stents otherwise.  EF greater than 60%.           Current Assessment & Plan     History of coronary disease and stenting in 2005 and 2013 on metoprolol succinate 25 XL daily Nitrostat 0.4 as needed.  The patient is not on beta-blocker not on ACE not on aspirin and not on statin therapy secondary to AML treatment ongoing.         Relevant Medications    nitroglycerin (NITROSTAT) 0.4 MG SL tablet    metoprolol succinate XL (TOPROL-XL) 25 MG 24 hr tablet    Benign essential hypertension - Primary    Overview     History of essential hypertension on metoprolol succinate 25 XL daily         Current Assessment & Plan     Continue metoprolol succinate 25 mg daily with current blood pressure 136/60         Relevant Medications    metoprolol succinate XL (TOPROL-XL) 25 MG 24 hr tablet       Endocrine    Diabetes mellitus, type 2 (CMS/[facility])    Overview     History of diabetes type 2 on glimepiride 4 mg in the a.m. 2 mg in the p.m. the patient brings in a list of his blood sugars and many are in the low 100s he denies hypoglycemia.  Caloric intake is 1870 dell/day with a glass of orange juice in the morning and Ensure Plus 5 cans daily is his only nutrition.         Current Assessment & Plan     Patient is on glimepiride, with several blood sugars in the low 100s,  his caloric intake is limited to nutritional shakes at 1870 dell his BMI is 20.2 he is on chemotherapy for AML I suggest he reduce the glimepiride to 2 mg twice daily.         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 therapy currently stable today's white count was 980, with hemoglobin of 8.2, and platelet count of 53,000,         Current Assessment & Plan     Ongoing therapy for AML, with low platelets, and low hemoglobin with difficulty eating consumes orange juice in the morning and 5 cans of Ensure Plus daily continue follow-up at [facility] with platelets and packed cells and the chemotherapy.         Relevant Medications    azaCITIDine in sodium chloride 0.9 % 100 mL IVPB    Venetoclax 100 MG tablet           Patient Instructions   Reduce glimepiride to 2 mg twice daily  Add Bactroban ointment to right port twice daily and to perirectal inflammatory minor fissure  Continue all therapy and follow-up with [facility]  Return visit in 6 weeks or as needed       Plan of care reviewed with patient at the conclusion of today's visit. Education was provided regarding diagnosis, management, and any prescribed or recommended OTC medications.Patient verbalizes understanding of and agreement with management plan.         Naresh Gutiérrez MD      Note: Part of this note  may be an electronic transcription/translation of spoken language to printed text using the Dragon Dictation system.

## 2020-01-08 NOTE — ASSESSMENT & PLAN NOTE
Patient is on glimepiride, with several blood sugars in the low 100s, his caloric intake is limited to nutritional shakes at 1870 dell his BMI is 20.2 he is on chemotherapy for AML I suggest he reduce the glimepiride to 2 mg twice daily.

## 2020-01-08 NOTE — ASSESSMENT & PLAN NOTE
Ongoing therapy for AML, with low platelets, and low hemoglobin with difficulty eating consumes orange juice in the morning and 5 cans of Ensure Plus daily continue follow-up at [facility] with platelets and packed cells and the chemotherapy.

## 2020-01-08 NOTE — ASSESSMENT & PLAN NOTE
History of coronary disease and stenting in 2005 and 2013 on metoprolol succinate 25 XL daily Nitrostat 0.4 as needed.  The patient is not on beta-blocker not on ACE not on aspirin and not on statin therapy secondary to AML treatment ongoing.

## 2020-01-08 NOTE — PATIENT INSTRUCTIONS
Reduce glimepiride to 2 mg twice daily  Add Bactroban ointment to right port twice daily and to perirectal inflammatory minor fissure  Continue all therapy and follow-up with [facility]  Return visit in 6 weeks or as needed

## 2020-01-08 NOTE — ASSESSMENT & PLAN NOTE
Suggest Bactroban ointment 2-3 times daily around the sugar-port inflammation around the perirectal inflammation

## 2020-01-22 NOTE — OUTREACH NOTE
Unable to connect with pt x 3 attempts to complete TCM Encounter. Pt is s/p hospital stay for port site infection and subsequent removal of port. Reimplantation to take place in about 2 weeks. Pt is not currently sched for TCM [facility]osp fwp with Dr Gutiérrez, but does have 6 week fwp sched in Feb.

## 2020-02-24 ENCOUNTER — TELEPHONE (OUTPATIENT)
Dept: INTERNAL MEDICINE | Facility: CLINIC | Age: 80
End: 2020-02-24

## 2024-12-17 NOTE — PROGRESS NOTES
Procrit injection held today due to Hgb of 11.2  Hold order reads to hold for Hgb greater than 10  
Warm